# Patient Record
Sex: FEMALE | Race: WHITE | NOT HISPANIC OR LATINO | Employment: UNEMPLOYED | URBAN - NONMETROPOLITAN AREA
[De-identification: names, ages, dates, MRNs, and addresses within clinical notes are randomized per-mention and may not be internally consistent; named-entity substitution may affect disease eponyms.]

---

## 2017-05-30 ENCOUNTER — OFFICE VISIT (OUTPATIENT)
Dept: FAMILY MEDICINE | Facility: OTHER | Age: 37
End: 2017-05-30
Attending: NURSE PRACTITIONER
Payer: COMMERCIAL

## 2017-05-30 VITALS
DIASTOLIC BLOOD PRESSURE: 80 MMHG | HEART RATE: 82 BPM | RESPIRATION RATE: 18 BRPM | WEIGHT: 250 LBS | TEMPERATURE: 99.5 F | BODY MASS INDEX: 40.18 KG/M2 | OXYGEN SATURATION: 98 % | SYSTOLIC BLOOD PRESSURE: 128 MMHG | HEIGHT: 66 IN

## 2017-05-30 DIAGNOSIS — J01.00 ACUTE MAXILLARY SINUSITIS, RECURRENCE NOT SPECIFIED: ICD-10-CM

## 2017-05-30 DIAGNOSIS — R07.0 THROAT PAIN: Primary | ICD-10-CM

## 2017-05-30 LAB
DEPRECATED S PYO AG THROAT QL EIA: NORMAL
MICRO REPORT STATUS: NORMAL
SPECIMEN SOURCE: NORMAL

## 2017-05-30 PROCEDURE — 87081 CULTURE SCREEN ONLY: CPT | Performed by: NURSE PRACTITIONER

## 2017-05-30 PROCEDURE — 87880 STREP A ASSAY W/OPTIC: CPT | Performed by: NURSE PRACTITIONER

## 2017-05-30 PROCEDURE — 99213 OFFICE O/P EST LOW 20 MIN: CPT | Performed by: NURSE PRACTITIONER

## 2017-05-30 RX ORDER — FLUTICASONE PROPIONATE 50 MCG
2 SPRAY, SUSPENSION (ML) NASAL DAILY
Qty: 1 BOTTLE | Refills: 0 | Status: SHIPPED | OUTPATIENT
Start: 2017-05-30 | End: 2019-07-18

## 2017-05-30 ASSESSMENT — PAIN SCALES - GENERAL: PAINLEVEL: WORST PAIN (10)

## 2017-05-30 NOTE — MR AVS SNAPSHOT
After Visit Summary   5/30/2017    Gwendolyn Dietrich    MRN: [id number]           Patient Information     Date Of Birth          1980        Visit Information        Provider Department      5/30/2017 11:00 AM Maria G Castellanos APRN [facility]        Today's Diagnoses     Throat pain    -  1    Acute maxillary sinusitis, recurrence not specified          Care Instructions      Self-Care for Sinusitis  Sinusitis can often be managed with self-care. Self-care can keep sinuses moist and make you feel more comfortable. Remember to follow your doctor's instructions closely, which can make a big difference in getting your sinus problem under control.    Drink fluids  Drinking extra fluids -- a glass every hour or two -- helps thin your mucus, allowing it to drain from your sinuses more easily. A humidifier helps in much the same way. Fluids can also offset the drying effects of certain drugs.  Use saltwater rinses  Rinses help keep your sinuses and nose moist. Mix a teaspoon of salt in 8 ounces of fresh, warm water. Use a bulb syringe to gently squirt the water into your nose a few times a day. You can also buy ready-made saline nasal sprays.  Apply hot or cold packs  Applying heat to the area surrounding your sinuses may make you feel more comfortable. Use a hot water bottle or a hand towel dipped in hot water. Some people also find ice packs effective for relieving pain.  Medications  Your doctor may prescribe medications to help treat your sinusitis. If you have an infection, antibiotics can help clear it up. If you are prescribed antibiotics, take all pills on schedule until they are gone, even if you feel better. Decongestants help relieve swelling. Use decongestant sprays for short periods only under the direction of your doctor. If you have allergies, your doctor may prescribe medications to help relieve them.     [id number] [employer]. [address] [place] [address]  "Road, [place] [address]. All rights reserved. This information is not intended as a substitute for professional medical care. Always follow your healthcare professional's instructions.                Follow-ups after your visit        Follow-up notes from your care team     Return if symptoms worsen or fail to improve.      Who to contact     If you have questions or need follow up information about today's clinic visit or your schedule please contact [facility] directly at [phone].  Normal or non-critical lab and imaging results will be communicated to you by [employer], letter or phone within 4 business days after the clinic has received the results. If you do not hear from us within 7 days, please contact the clinic through [employer] or phone. If you have a critical or abnormal lab result, we will notify you by phone as soon as possible.  Submit refill requests through "[employer]" or call your pharmacy and they will forward the refill request to us. Please allow 3 business days for your refill to be completed.          Additional Information About Your Visit        [employer] Information     "[employer] gives you secure access to your electronic health record. If you see a primary care provider, you can also send messages to your care team and make appointments. If you have questions, please call your primary care clinic.  If you do not have a primary care provider, please call [phone] and they will assist you.        Care EveryWhere ID     This is your Care EveryWhere ID. This could be used by other organizations to access your [facility] medical records  [id number]        Your Vitals Were     Pulse Temperature Respirations Height Pulse Oximetry BMI (Body Mass Index)    82 99.5  F (37.5  C) (Tympanic) 18 5' 5.5\" (1.664 m) 98% 40.97 kg/m2       Blood Pressure from Last 3 Encounters:   05/30/17 128/80   11/09/16 118/76   11/01/16 110/78    Weight from Last 3 Encounters:   05/30/17 250 lb (113.4 kg) "   11/09/16 254 lb 12 oz (115.6 kg)   11/01/16 248 lb (112.5 kg)              We Performed the Following     Beta strep group A culture     Rapid strep screen          Today's Medication Changes          These changes are accurate as of: 5/30/17 11:35 AM.  If you have any questions, ask your nurse or doctor.               Start taking these medicines.        Dose/Directions    amoxicillin-clavulanate 875-125 MG per tablet   Commonly known as:  AUGMENTIN   Used for:  Acute maxillary sinusitis, recurrence not specified   Started by:  Maria G Castellanos APRN CNP        Dose:  1 tablet   Take 1 tablet by mouth 2 times daily   Quantity:  20 tablet   Refills:  0       fluticasone 50 MCG/ACT spray   Commonly known as:  FLONASE   Used for:  Acute maxillary sinusitis, recurrence not specified   Started by:  Maria G Castellanos APRN CNP        Dose:  2 spray   Spray 2 sprays into both nostrils daily   Quantity:  1 Bottle   Refills:  0            Where to get your medicines      These medications were sent to [facility] [address] [place] [address], [place] - [address] [place] [facility] [place] [facility] [address], [facility] [address]     Phone:  [phone]     amoxicillin-clavulanate 875-125 MG per tablet    fluticasone 50 MCG/ACT spray                Primary Care Provider    None       No address on file        Thank you!     Thank you for choosing [facility]  for your care. Our goal is always to provide you with excellent care. Hearing back from our patients is one way we can continue to improve our services. Please take a few minutes to complete the written survey that you may receive in the mail after your visit with us. Thank you!             Your Updated Medication List - Protect others around you: Learn how to safely use, store and throw away your medicines at www.disposemymeds.org.          This list is accurate as of: 5/30/17 11:35 AM.  Always use your most recent med list.                   Brand  Name Dispense Instructions for use    amoxicillin-clavulanate 875-125 MG per tablet    AUGMENTIN    20 tablet    Take 1 tablet by mouth 2 times daily       BENADRYL PO      Take by mouth every 4 hours as needed       EPINEPHrine 0.3 MG/0.3ML injection     2 each    Inject 0.3 mLs (0.3 mg) into the muscle once as needed for anaphylaxis       fluticasone 50 MCG/ACT spray    FLONASE    1 Bottle    Spray 2 sprays into both nostrils daily       nystatin-triamcinolone cream    MYCOLOG II    30 g    Apply topically 2 times daily

## 2017-05-30 NOTE — PATIENT INSTRUCTIONS
Self-Care for Sinusitis  Sinusitis can often be managed with self-care. Self-care can keep sinuses moist and make you feel more comfortable. Remember to follow your doctor's instructions closely, which can make a big difference in getting your sinus problem under control.    Drink fluids  Drinking extra fluids -- a glass every hour or two -- helps thin your mucus, allowing it to drain from your sinuses more easily. A humidifier helps in much the same way. Fluids can also offset the drying effects of certain drugs.  Use saltwater rinses  Rinses help keep your sinuses and nose moist. Mix a teaspoon of salt in 8 ounces of fresh, warm water. Use a bulb syringe to gently squirt the water into your nose a few times a day. You can also buy ready-made saline nasal sprays.  Apply hot or cold packs  Applying heat to the area surrounding your sinuses may make you feel more comfortable. Use a hot water bottle or a hand towel dipped in hot water. Some people also find ice packs effective for relieving pain.  Medications  Your doctor may prescribe medications to help treat your sinusitis. If you have an infection, antibiotics can help clear it up. If you are prescribed antibiotics, take all pills on schedule until they are gone, even if you feel better. Decongestants help relieve swelling. Use decongestant sprays for short periods only under the direction of your doctor. If you have allergies, your doctor may prescribe medications to help relieve them.     [id number] [employer]. [address] [place] [address], [place] [address]. All rights reserved. This information is not intended as a substitute for professional medical care. Always follow your healthcare professional's instructions.

## 2017-05-30 NOTE — PROGRESS NOTES
SUBJECTIVE:                                                    Gwendolyn Dietrich is a 36 year old female who presents to clinic today for the following health issues:      RESPIRATORY SYMPTOMS      Duration: 5 days    Description  nasal congestion, rhinorrhea, sore throat, facial pain/pressure, cough, fever, chills, ear pain bilateral, headache, fatigue/malaise, hoarse voice, nausea, stomach ache and diarrhea    Severity: severe - worsening symptoms    Accompanying signs and symptoms: see above    History (predisposing factors):  Exposure to school children    Precipitating or alleviating factors: None    Therapies tried and outcome:  rest and fluids oral decongestant           Problem list and histories reviewed & adjusted, as indicated.  Additional history: as documented    Patient Active Problem List   Diagnosis     Obesity     ACP (advance care planning)     Past Surgical History:   Procedure Laterality Date     ABDOMEN SURGERY  2012    Partial  hysterectomy     BACK SURGERY  2005     BIOPSY OF SKIN LESION       CHOLECYSTECTOMY  2011     HYSTERECTOMY  2012    menorrhagia, precancerous cells     ORTHOPEDIC SURGERY  2005    back surgery     TONSILLECTOMY       TUBAL LIGATION  2007       Social History   Substance Use Topics     Smoking status: Former Smoker     Types: Cigarettes     Quit date: 4/22/2008     Smokeless tobacco: Never Used     Alcohol use No     Family History   Problem Relation Age of Onset     Hearing Loss Father      Lipids Father      Hypertension Father      DIABETES Father      Lipids Mother      Hypertension Mother      Irritable Bowel Syndrome Mother      Obesity Mother      Rheumatoid Arthritis Mother      DIABETES Mother      CANCER Maternal Grandmother      OSTEOPOROSIS Maternal Grandmother      CANCER Paternal Grandmother          Current Outpatient Prescriptions   Medication Sig Dispense Refill     nystatin-triamcinolone (MYCOLOG II) cream Apply topically 2 times daily 30 g 1      "DiphenhydrAMINE HCl (BENADRYL PO) Take by mouth every 4 hours as needed       EPINEPHrine (EPIPEN) 0.3 MG/0.3ML injection Inject 0.3 mLs (0.3 mg) into the muscle once as needed for anaphylaxis 2 each 1     Allergies   Allergen Reactions     Codeine GI Disturbance       Reviewed and updated as needed this visit by clinical staff  Tobacco  Allergies  Meds  Med Hx  Surg Hx  Fam Hx  Soc Hx      Reviewed and updated as needed this visit by Provider         ROS:  CONSTITUTIONAL:chills, fatigue and fever   ENT/MOUTH: ear pain bilateral, hoarse voice, postnasal drainage, sinus pressure and sore throat  RESP:cough-non productive - shortness of breath  CV: tightness with shortness of breath  GI: diarrhea  : denies dysuria    OBJECTIVE:                                                    /80 (BP Location: Left arm, Patient Position: Chair, Cuff Size: Adult Large)  Pulse 82  Temp 99.5  F (37.5  C) (Tympanic)  Resp 18  Ht 5' 5.5\" (1.664 m)  Wt 250 lb (113.4 kg)  SpO2 98%  BMI 40.97 kg/m2  Body mass index is 40.97 kg/(m^2).   GENERAL: alert, no distress and fatigued  EYES: Eyes grossly normal to inspection, PERRL and conjunctivae and sclerae normal  HENT: normal cephalic/atraumatic, ear canals and TM's normal, nose and mouth without ulcers or lesions, oropharynx clear, oral mucous membranes moist and sinuses: maxillary, frontal tenderness on both sides, maxillary, frontal swelling on both sides  NECK: no adenopathy, no asymmetry, masses, or scars and thyroid normal to palpation  RESP: lungs clear to auscultation - no rales, rhonchi or wheezes  CV: regular rate and rhythm, normal S1 S2, no S3 or S4, no murmur, click or rub, no peripheral edema and peripheral pulses strong  ABDOMEN: soft, nontender, no hepatosplenomegaly, no masses and bowel sounds normal  PSYCH: mentation appears normal, affect normal/bright  LYMPH: normal ant/post cervical, supraclavicular nodes    Diagnostic Test Results:  none  "     ASSESSMENT:                                                        PLAN:                                                    ASSESSMENT / PLAN:  (R07.0) Throat pain  (primary encounter diagnosis)  Comment:   Plan:  Rapid strep screen,    Beta strep group A culture            (J01.00) Acute maxillary sinusitis, recurrence not specified  Comment:   Plan:  amoxicillin-clavulanate (AUGMENTIN) 875-125 MG per tablet,  fluticasone (FLONASE) 50 MCG/ACT spray   Saline nasal rinse   Stay hydrated      Follow up if no improvement or worsening symptoms             Maria G Castellanos, [facility]

## 2017-05-30 NOTE — NURSING NOTE
"Chief Complaint   Patient presents with     Sick       Initial /80 (BP Location: Left arm, Patient Position: Chair, Cuff Size: Adult Large)  Pulse 82  Temp 99.5  F (37.5  C) (Tympanic)  Resp 18  Ht 5' 5.5\" (1.664 m)  Wt 250 lb (113.4 kg)  SpO2 98%  BMI 40.97 kg/m2 Estimated body mass index is 40.97 kg/(m^2) as calculated from the following:    Height as of this encounter: 5' 5.5\" (1.664 m).    Weight as of this encounter: 250 lb (113.4 kg).  Medication Reconciliation: complete    "

## 2017-06-01 LAB
BACTERIA SPEC CULT: NORMAL
MICRO REPORT STATUS: NORMAL
SPECIMEN SOURCE: NORMAL

## 2017-09-17 DIAGNOSIS — T78.40XA ALLERGIC REACTION, INITIAL ENCOUNTER: ICD-10-CM

## 2017-09-17 DIAGNOSIS — T78.2XXA ANAPHYLACTIC REACTION: ICD-10-CM

## 2017-09-18 RX ORDER — EPINEPHRINE 0.3 MG/.3ML
INJECTION SUBCUTANEOUS
Qty: 2 ML | Refills: 0 | Status: SHIPPED | OUTPATIENT
Start: 2017-09-18 | End: 2019-09-13

## 2017-11-26 ENCOUNTER — HEALTH MAINTENANCE LETTER (OUTPATIENT)
Age: 37
End: 2017-11-26

## 2018-01-23 ENCOUNTER — DOCUMENTATION ONLY (OUTPATIENT)
Dept: FAMILY MEDICINE | Facility: OTHER | Age: 38
End: 2018-01-23

## 2018-01-23 PROBLEM — G43.909 MIGRAINE HEADACHE: Status: ACTIVE | Noted: 2018-01-23

## 2018-01-23 PROBLEM — E78.5 DYSLIPIDEMIA: Status: ACTIVE | Noted: 2018-01-23

## 2019-07-18 ENCOUNTER — HOSPITAL ENCOUNTER (EMERGENCY)
Facility: HOSPITAL | Age: 39
Discharge: HOME | End: 2019-07-18
Attending: NURSE PRACTITIONER | Admitting: NURSE PRACTITIONER
Payer: COMMERCIAL

## 2019-07-18 VITALS
TEMPERATURE: 97.7 F | DIASTOLIC BLOOD PRESSURE: 82 MMHG | SYSTOLIC BLOOD PRESSURE: 125 MMHG | RESPIRATION RATE: 18 BRPM | OXYGEN SATURATION: 98 %

## 2019-07-18 DIAGNOSIS — T78.40XA ALLERGIC REACTION, INITIAL ENCOUNTER: ICD-10-CM

## 2019-07-18 DIAGNOSIS — R22.0 FACIAL SWELLING: ICD-10-CM

## 2019-07-18 LAB
DEPRECATED S PYO AG THROAT QL EIA: NORMAL
SPECIMEN SOURCE: NORMAL

## 2019-07-18 PROCEDURE — 87880 STREP A ASSAY W/OPTIC: CPT | Performed by: NURSE PRACTITIONER

## 2019-07-18 PROCEDURE — G0463 HOSPITAL OUTPT CLINIC VISIT: HCPCS

## 2019-07-18 PROCEDURE — 99213 OFFICE O/P EST LOW 20 MIN: CPT | Mod: Z6 | Performed by: NURSE PRACTITIONER

## 2019-07-18 PROCEDURE — 25000125 ZZHC RX 250: Performed by: NURSE PRACTITIONER

## 2019-07-18 PROCEDURE — 25000132 ZZH RX MED GY IP 250 OP 250 PS 637: Performed by: NURSE PRACTITIONER

## 2019-07-18 PROCEDURE — 87081 CULTURE SCREEN ONLY: CPT | Performed by: NURSE PRACTITIONER

## 2019-07-18 RX ORDER — DEXAMETHASONE 2 MG/1
TABLET ORAL
Qty: 7 TABLET | Refills: 0 | Status: SHIPPED | OUTPATIENT
Start: 2019-07-18 | End: 2019-09-13

## 2019-07-18 RX ORDER — CETIRIZINE HYDROCHLORIDE 10 MG/1
10 TABLET ORAL ONCE
Status: COMPLETED | OUTPATIENT
Start: 2019-07-18 | End: 2019-07-18

## 2019-07-18 RX ORDER — EPINEPHRINE 0.3 MG/.3ML
0.3 INJECTION SUBCUTANEOUS PRN
Qty: 2 EACH | Refills: 1 | Status: SHIPPED | OUTPATIENT
Start: 2019-07-18 | End: 2020-11-16

## 2019-07-18 RX ORDER — DEXAMETHASONE SODIUM PHOSPHATE 10 MG/ML
10 INJECTION INTRAMUSCULAR; INTRAVENOUS ONCE
Status: COMPLETED | OUTPATIENT
Start: 2019-07-18 | End: 2019-07-18

## 2019-07-18 RX ADMIN — RANITIDINE 150 MG: 150 TABLET ORAL at 10:20

## 2019-07-18 RX ADMIN — CETIRIZINE HYDROCHLORIDE 10 MG: 10 TABLET ORAL at 10:20

## 2019-07-18 RX ADMIN — DEXAMETHASONE SODIUM PHOSPHATE 10 MG: 10 INJECTION INTRAMUSCULAR; INTRAVENOUS at 10:20

## 2019-07-18 ASSESSMENT — ENCOUNTER SYMPTOMS
FEVER: 0
DYSURIA: 0
MYALGIAS: 0
DIZZINESS: 0
APPETITE CHANGE: 0
VOMITING: 0
SORE THROAT: 1
ARTHRALGIAS: 0
PHOTOPHOBIA: 1
SHORTNESS OF BREATH: 0
SINUS PRESSURE: 0
SINUS PAIN: 0
WHEEZING: 0
CHILLS: 0
LIGHT-HEADEDNESS: 0
WEAKNESS: 0
JOINT SWELLING: 0
HEADACHES: 1
COUGH: 1
DIARRHEA: 1
NAUSEA: 0
FATIGUE: 0

## 2019-07-18 NOTE — ED NOTES
Pt presents with facial and tongue swelling. States her lips started feeling puffy yesterday and this morning her whole mouth was puffy. States throat is slightly sore but denies any throat swelling or hard time breathing. Denies doing or eating anything out of the norm. Has history of allergic reactions.

## 2019-07-18 NOTE — ED AVS SNAPSHOT
[facility]  [address]  [place] [address]  Phone:  [phone]                                    Gwendolyn Dietrich   MRN: [id number]    Department:  [facility]   Date of Visit:  7/18/2019           After Visit Summary Signature Page    I have received my discharge instructions, and my questions have been answered. I have discussed any challenges I see with this plan with the nurse or doctor.    ..........................................................................................................................................  Patient/Patient Representative Signature      ..........................................................................................................................................  Patient Representative Print Name and Relationship to Patient    ..................................................               ................................................  Date                                   Time    ..........................................................................................................................................  Reviewed by Signature/Title    ...................................................              ..............................................  Date                                               Time          22EPIC Rev 08/18

## 2019-07-18 NOTE — ED PROVIDER NOTES
"  History     Chief Complaint   Patient presents with     Allergic Reaction     facial swelling, tongue swelling. no shortness of breath. denies any throat swelling.      HPI  Gwendolyn Dietrich is a 38 year old female who presents today with a CC of lip swelling, sore throat, dry throat and mouth that started last night.  She denies trouble swallowing or breathing.  No wheezing.   She has a history of anaphylaxis in ~ .  She had allergy testing that showed multiple environmental allergies.  She has been outside frequently and has been sleeping with her windows open.  No other known exposures that she is aware of.  She is helping out with her son's baseball team, states \"they are little germ factories\".  Has sore throat, but thinks it is due to swelling/dry throat.    She has epi pens but they have , has not required epi injection.  She has not taken anything for symptoms.  She does not take a daily antihistamine.      Allergies:  Allergies   Allergen Reactions     Codeine GI Disturbance       Problem List:    Patient Active Problem List    Diagnosis Date Noted     Dyslipidemia 2018     Priority: Medium     Migraine headache 2018     Priority: Medium     Overview:   Recurrent migraine and tension headaches       ACP (advance care planning) 2016     Priority: Medium     Advance Care Planning 2016: ACP Review of Chart / Resources Provided:  Reviewed chart for advance care plan.  Gwendolyn Dietrich has no plan or code status on file. Discussed available resources and provided with information. Confirmed code status reflects current choices pending further ACP discussions.  Confirmed/documented legally designated decision makers.  Added by MARAH CALDERON             Pain in joint 12/10/2013     Priority: Medium     Obesity 2013     Priority: Medium     Hereditary and idiopathic peripheral neuropathy 2011     Priority: Medium        Past Medical History:    Past Medical " History:   Diagnosis Date     Obesity 2013       Past Surgical History:    Past Surgical History:   Procedure Laterality Date     ABDOMEN SURGERY  2012    Partial  hysterectomy     BACK SURGERY       BIOPSY OF SKIN LESION       CHOLECYSTECTOMY  2011     HYSTERECTOMY  2012    menorrhagia, precancerous cells     ORTHOPEDIC SURGERY      back surgery     TONSILLECTOMY       TUBAL LIGATION         Family History:    Family History   Problem Relation Age of Onset     Hearing Loss Father      Lipids Father      Hypertension Father      Diabetes Father      Lipids Mother      Hypertension Mother      Irritable Bowel Syndrome Mother      Obesity Mother      Rheumatoid Arthritis Mother      Diabetes Mother      Cancer Maternal Grandmother      Osteoporosis Maternal Grandmother      Cancer Paternal Grandmother        Social History:  Marital Status:   [2]  Social History     Tobacco Use     Smoking status: Former Smoker     Types: Cigarettes     Last attempt to quit: 2008     Years since quittin.2     Smokeless tobacco: Never Used   Substance Use Topics     Alcohol use: No     Drug use: No        Medications:      cetirizine HCl 10 MG CAPS   dexamethasone (DECADRON) 2 MG tablet   DiphenhydrAMINE HCl (BENADRYL PO)   EPINEPHrine (EPIPEN/ADRENACLICK/OR ANY BX GENERIC EQUIV) 0.3 MG/0.3ML injection 2-pack   ranitidine (ZANTAC) 150 MG tablet   EPINEPHrine (EPIPEN/ADRENACLICK/OR ANY BX GENERIC EQUIV) 0.3 MG/0.3ML injection 2-pack         Review of Systems   Constitutional: Negative for appetite change, chills, fatigue and fever.   HENT: Positive for sore throat. Negative for dental problem, ear pain, mouth sores, sinus pressure and sinus pain.         Lip swelling   Eyes: Positive for photophobia (with headache).   Respiratory: Positive for cough (mild). Negative for shortness of breath and wheezing.    Gastrointestinal: Positive for diarrhea (mild, ate greasy food). Negative for nausea and vomiting.    Genitourinary: Positive for decreased urine volume (mildly decreased). Negative for dysuria.   Musculoskeletal: Negative for arthralgias, joint swelling and myalgias.   Skin: Negative for rash.   Neurological: Positive for headaches (frontal x 3-4 days, 7/10, has a history of migraines). Negative for dizziness, weakness and light-headedness.       Physical Exam   BP: 125/82  Heart Rate: 73  Temp: 97.7  F (36.5  C)  Resp: 18  SpO2: 98 %      Physical Exam   Constitutional: She is oriented to person, place, and time. She appears well-developed. She is cooperative. She does not appear ill.   HENT:   Head: Normocephalic and atraumatic.   Right Ear: Tympanic membrane, external ear and ear canal normal.   Left Ear: Tympanic membrane, external ear and ear canal normal.   Nose: No mucosal edema. Right sinus exhibits no maxillary sinus tenderness and no frontal sinus tenderness. Left sinus exhibits no maxillary sinus tenderness and no frontal sinus tenderness.   Mouth/Throat: Mucous membranes are dry. No trismus in the jaw. No uvula swelling or dental caries. Posterior oropharyngeal edema (bilateral tonsillar pillars with mild edema, no erythema) present. No oropharyngeal exudate or tonsillar abscesses.   Both lips are mildly swollen   Eyes: Conjunctivae are normal.   Neck: Normal range of motion. Neck supple.   Cardiovascular: Normal rate and regular rhythm.   Pulmonary/Chest: Effort normal and breath sounds normal. No respiratory distress. She has no wheezes.   Musculoskeletal: Normal range of motion.   Neurological: She is alert and oriented to person, place, and time.   Skin: Skin is warm and dry.   Psychiatric: She has a normal mood and affect. Her behavior is normal.   Nursing note and vitals reviewed.      ED Course     Results for orders placed or performed during the hospital encounter of 07/18/19   Rapid strep screen   Result Value Ref Range    Specimen Description Throat     Rapid Strep A Screen       NEGATIVE:  No Group A streptococcal antigen detected by immunoassay, await culture report.   Beta strep group A culture   Result Value Ref Range    Specimen Description Throat     Culture Micro Culture negative monitoring continues        Procedures    Medications   dexamethasone oral soln (DECADRON) (inj used orally,not preservative free) 10 mg (10 mg Oral Given 7/18/19 1020)   ranitidine (ZANTAC) tablet 150 mg (150 mg Oral Given 7/18/19 1020)   cetirizine (zyrTEC) tablet 10 mg (10 mg Oral Given 7/18/19 1020)     Observed for a minimum of 20 minutes, tolerated medications well, no adverse efects noted.  Lip swelling has resolved on discharge    Assessments & Plan (with Medical Decision Making)     I have reviewed the nursing notes.    I have reviewed the findings, diagnosis, plan and need for follow up with the patient.  ASSESSMENT / PLAN:  (R22.0) Facial swelling  Comment: lip swelling, tonsillar pillar swelling that was resolved with decadron  Plan:  Decadron as prescribed  Zyrtec as prescribed  Zantac as prescribed  Epi pen prescription renewed - keep with you at all times  Return to ED with worsening of symptoms or new concerns  Patient requested referral to ENT for Allergy testing - this was done, they will call you to schedule  Follow up with PCP as needed if symptoms persist    (T78.40XA) Allergic reaction, initial encounter         Medication List      Started    cetirizine HCl 10 MG Caps  10 mg, Oral, AT BEDTIME     dexamethasone 2 MG tablet  Commonly known as:  DECADRON  Take 8 mg on 7/19 and 6 mg on 7/20 then discontinue     ranitidine 150 MG tablet  Commonly known as:  ZANTAC  150 mg, Oral, 2 TIMES DAILY        Modified    * EPINEPHrine 0.3 MG/0.3ML injection 2-pack  Commonly known as:  EPIPEN/ADRENACLICK/or ANY BX GENERIC EQUIV  INJECT 1 PEN INTO THE MUSCLE AS NEEDED FOR ANAPHYLAXIS  What changed:  Another medication with the same name was added. Make sure you understand how and when to take each.     *  EPINEPHrine 0.3 MG/0.3ML injection 2-pack  Commonly known as:  EPIPEN/ADRENACLICK/or ANY BX GENERIC EQUIV  0.3 mg, Intramuscular, PRN  What changed:  You were already taking a medication with the same name, and this prescription was added. Make sure you understand how and when to take each.         * This list has 2 medication(s) that are the same as other medications prescribed for you. Read the directions carefully, and ask your doctor or other care provider to review them with you.                Final diagnoses:   Facial swelling   Allergic reaction, initial encounter       7/18/2019   [facility]     Jazzmine Nava NP  07/19/19 [id number]

## 2019-07-20 LAB
BACTERIA SPEC CULT: NORMAL
SPECIMEN SOURCE: NORMAL

## 2019-09-12 NOTE — PROGRESS NOTES
"Otolaryngology Consultation    Patient: Gwendolyn Dietrich  : 1980    Chief Complaint   Patient presents with     Referral     LEO Nava Referral   Facial Sweeling & Allergies     HPI:  Gwendolyn Dietrich is a 38 year old female seen today for allergy evaluation.  She returns to ENT. She was last seen in  following allergic reaction. Complete MQT and Serolab testing.   She has noted concerns for allergy symptoms remaining.   She has ongoing nasal congestion, runny nose, sneezing, allergic conjunctivitis.   She had facial swelling along her lips. She does take her Zyrtec daily or most days.     Denies facial pain or pressure. No recurrent sinusitis.     Gwendolyn Dietrich is a 38 year old female who presents today with a CC of lip swelling, sore throat, dry throat and mouth that started last night.  She denies trouble swallowing or breathing.  No wheezing.   She has a history of anaphylaxis in ~ .  She had allergy testing that showed multiple environmental allergies.  She has been outside frequently and has been sleeping with her windows open.  No other known exposures that she is aware of.  She is helping out with her son's baseball team, states \"they are little germ factories\".  Has sore throat, but thinks it is due to swelling/dry throat.    She has epi pens but they have , has not required epi injection.  She has not taken anything for symptoms.  She does not take a daily antihistamine.    Mother has metal allergies.   No family hx of angioedema.     2015  Multiple positives.   Dilution #6: Birch, Alterna. Dilution #5: cottonwood, walnut, cat, and dust.      Serolab. Elevated IgE.   Positives to Dust, Egg White.   She had sensivity in past to oranges.   Current Outpatient Rx   Medication Sig Dispense Refill     cetirizine HCl 10 MG CAPS Take 1 capsule (10 mg) by mouth At Bedtime 30 capsule 11     dexamethasone (DECADRON) 2 MG tablet Take 8 mg on  and 6 mg on  then discontinue 7 " "tablet 0     DiphenhydrAMINE HCl (BENADRYL PO) Take by mouth every 4 hours as needed       EPINEPHrine (EPIPEN/ADRENACLICK/OR ANY BX GENERIC EQUIV) 0.3 MG/0.3ML injection 2-pack Inject 0.3 mLs (0.3 mg) into the muscle as needed for anaphylaxis 2 each 1     EPINEPHrine (EPIPEN/ADRENACLICK/OR ANY BX GENERIC EQUIV) 0.3 MG/0.3ML injection 2-pack INJECT 1 PEN INTO THE MUSCLE AS NEEDED FOR ANAPHYLAXIS 2 mL 0       Allergies: Codeine     Past Medical History:   Diagnosis Date     Obesity 2013       Past Surgical History:   Procedure Laterality Date     ABDOMEN SURGERY      Partial  hysterectomy     BACK SURGERY       BIOPSY OF SKIN LESION       CHOLECYSTECTOMY       HYSTERECTOMY      menorrhagia, precancerous cells     ORTHOPEDIC SURGERY      back surgery     TONSILLECTOMY       TUBAL LIGATION  2007       ENT family history reviewed    Social History     Tobacco Use     Smoking status: Former Smoker     Types: Cigarettes     Last attempt to quit: 2008     Years since quittin.3     Smokeless tobacco: Never Used   Substance Use Topics     Alcohol use: No     Drug use: No       Review of Systems  ROS: 10 point ROS neg other than the symptoms noted above in the HPI     Physical Exam  /74   Pulse 72   Temp 97  F (36.1  C) (Tympanic)   Ht 1.651 m (5' 5\")   Wt 111.1 kg (245 lb)   SpO2 98%   BMI 40.77 kg/m    General - The patient is well nourished and well developed, and appears to have good nutritional status.  Alert and oriented to person and place, answers questions and cooperates with examination appropriately.   Head and Face - Normocephalic and atraumatic, with no gross asymmetry noted.  The facial nerve is intact, with strong symmetric movements.  Voice and Breathing - The patient was breathing comfortably without the use of accessory muscles. There was no wheezing, stridor, or stertor.  The patients voice was clear and strong, and had appropriate pitch and quality.  Ears -The " external auditory canals are patent, the tympanic membranes are intact without effusion, retraction or mass.  Bony landmarks are intact.  Eyes - Extraocular movements intact, and the pupils were reactive to light.  Sclera were not icteric or injected, conjunctiva were pink and moist.  Mouth - Examination of the oral cavity showed pink, healthy oral mucosa. No lesions or ulcerations noted.  The tongue was mobile and midline, and the dentition were in good condition.    Throat - The walls of the oropharynx were smooth, pink, moist, symmetric, and had no lesions or ulcerations.  The tonsillar pillars and soft palate were symmetric.  The uvula was midline on elevation.    Neck - Normal midline excursion of the laryngotracheal complex during swallowing.  Full range of motion on passive movement.  Palpation of the occipital, submental, submandibular, internal jugular chain, and supraclavicular nodes did not demonstrate any abnormal lymph nodes or masses.  Palpation of the thyroid was soft and smooth, with no nodules or goiter appreciated.  The trachea was mobile and midline.  Nose - External contour is symmetric, no gross deflection or scars.  Nasal mucosa is pink and moist with no abnormal mucus.  The septum was intact and the turbinates are enlarged.  No polyps, masses, or purulence noted on examination.      ASSESSMENT:      ICD-10-CM    1. Perennial allergic rhinitis J30.89 fexofenadine (ALLEGRA) 180 MG tablet     Complement C4     C1-Esterase Inhibitor Level     CANCELED: C1 Esterase Inhibitor ([facility])   2. Facial swelling R22.0 fexofenadine (ALLEGRA) 180 MG tablet     Complement C4     C1-Esterase Inhibitor Level     CANCELED: C1 Esterase Inhibitor ([facility])   3. Angioedema, subsequent encounter T78.3XXD fexofenadine (ALLEGRA) 180 MG tablet     Complement C4     C1-Esterase Inhibitor Level     CANCELED: C1 Esterase Inhibitor ([facility])       Complete C1, C4 for rule out angioedema.   Change Zyrtec to Allegra.      Take your antihistamine daily (cetirizine, loratadine, fexofenadine, or similar) or twice daily if recommended.    Allergen avoidance measures were discussed and are important in preventing symptoms from occurring or worsening.    Indications for allergy testing include:   1) Confirm suspicion of allergic rhinitis due to inhalant allergies  2) Identify the offending allergen to determine specific mode of treatment  3) In the case of chronic rhinosinusitis: when symptoms are not controlled by avoidance and pharmacotherapy  4) In the Asthma patient when exacerbations may be due to perennial allergen exposure  5) Suspect food allergy  6) Otitis Media, chronic rhinitis, atopic dermatitis, Meniere disease, headache, pharyngitis or eye symptoms    Due to the findings above,  modified quantitative testing (MQT) will be performed.  Signed consent was obtained, and the risks of immunotherapy were discussed, including the potential for anaphylaxis.    If immunotherapy (IT) is recommended, there is continued risk of anaphylaxis.   Anaphylaxis can cause death. The patient will need to be monitored for 30 minutes post injection.  They must present their epinephrine pen prior to injection.  Subcutaneous as well as sublingual immunotherapy (SLIT) were discussed as potential treatment options.  The patient was told SLIT is not approved by the FDA and is cash pay.  The general time frame of immunotherapy was discussed (generally 3-5 years, sometimes longer), and the basic immunology behind IT was discussed.            Pati Bradley PA-C  [facility], [place]  [phone]

## 2019-09-13 ENCOUNTER — OFFICE VISIT (OUTPATIENT)
Dept: OTOLARYNGOLOGY | Facility: OTHER | Age: 39
End: 2019-09-13
Attending: NURSE PRACTITIONER
Payer: COMMERCIAL

## 2019-09-13 VITALS
HEART RATE: 72 BPM | SYSTOLIC BLOOD PRESSURE: 104 MMHG | HEIGHT: 65 IN | OXYGEN SATURATION: 98 % | WEIGHT: 245 LBS | TEMPERATURE: 97 F | BODY MASS INDEX: 40.82 KG/M2 | DIASTOLIC BLOOD PRESSURE: 74 MMHG

## 2019-09-13 DIAGNOSIS — J30.89 PERENNIAL ALLERGIC RHINITIS: Primary | ICD-10-CM

## 2019-09-13 DIAGNOSIS — T78.3XXD ANGIOEDEMA, SUBSEQUENT ENCOUNTER: ICD-10-CM

## 2019-09-13 DIAGNOSIS — R22.0 FACIAL SWELLING: ICD-10-CM

## 2019-09-13 PROCEDURE — 86160 COMPLEMENT ANTIGEN: CPT | Mod: 90 | Performed by: PHYSICIAN ASSISTANT

## 2019-09-13 PROCEDURE — 99214 OFFICE O/P EST MOD 30 MIN: CPT | Performed by: PHYSICIAN ASSISTANT

## 2019-09-13 PROCEDURE — 36415 COLL VENOUS BLD VENIPUNCTURE: CPT | Performed by: PHYSICIAN ASSISTANT

## 2019-09-13 PROCEDURE — 99000 SPECIMEN HANDLING OFFICE-LAB: CPT | Performed by: PHYSICIAN ASSISTANT

## 2019-09-13 PROCEDURE — 86160 COMPLEMENT ANTIGEN: CPT | Performed by: PHYSICIAN ASSISTANT

## 2019-09-13 RX ORDER — FEXOFENADINE HCL 180 MG/1
180 TABLET ORAL DAILY
Qty: 90 TABLET | Refills: 3 | Status: SHIPPED | OUTPATIENT
Start: 2019-09-13 | End: 2024-07-10

## 2019-09-13 ASSESSMENT — PAIN SCALES - GENERAL: PAINLEVEL: NO PAIN (0)

## 2019-09-13 ASSESSMENT — MIFFLIN-ST. JEOR: SCORE: 1782.19

## 2019-09-13 NOTE — LETTER
"    2019         RE: Gwendolyn Dietrich  [address] [address] [address]        Dear Colleague,    Thank you for referring your patient, Gwendolyn Dietrich, to the [facility]. Please see a copy of my visit note below.    Otolaryngology Consultation    Patient: Gwendolyn Dietrich  : 1980    Chief Complaint   Patient presents with     Referral     LEO Nava Referral   Facial Sweeling & Allergies     HPI:  Gwendolyn Dietrich is a 38 year old female seen today for allergy evaluation.  She returns to ENT. She was last seen in  following allergic reaction. Complete MQT and Serolab testing.   She has noted concerns for allergy symptoms remaining.   She has ongoing nasal congestion, runny nose, sneezing, allergic conjunctivitis.   She had facial swelling along her lips. She does take her Zyrtec daily or most days.     Denies facial pain or pressure. No recurrent sinusitis.     Gwendolyn Dietrich is a 38 year old female who presents today with a CC of lip swelling, sore throat, dry throat and mouth that started last night.  She denies trouble swallowing or breathing.  No wheezing.   She has a history of anaphylaxis in ~ .  She had allergy testing that showed multiple environmental allergies.  She has been outside frequently and has been sleeping with her windows open.  No other known exposures that she is aware of.  She is helping out with her son's baseball team, states \"they are little germ factories\".  Has sore throat, but thinks it is due to swelling/dry throat.    She has epi pens but they have , has not required epi injection.  She has not taken anything for symptoms.  She does not take a daily antihistamine.    Mother has metal allergies.   No family hx of angioedema.     2015  Multiple positives.   Dilution #6: Birch, Alterna. Dilution #5: cottonwood, walnut, cat, and dust.      Serolab. Elevated IgE.   Positives to Dust, Egg White.   She had sensivity in past to " "oranges.   Current Outpatient Rx   Medication Sig Dispense Refill     cetirizine HCl 10 MG CAPS Take 1 capsule (10 mg) by mouth At Bedtime 30 capsule 11     dexamethasone (DECADRON) 2 MG tablet Take 8 mg on  and 6 mg on  then discontinue 7 tablet 0     DiphenhydrAMINE HCl (BENADRYL PO) Take by mouth every 4 hours as needed       EPINEPHrine (EPIPEN/ADRENACLICK/OR ANY BX GENERIC EQUIV) 0.3 MG/0.3ML injection 2-pack Inject 0.3 mLs (0.3 mg) into the muscle as needed for anaphylaxis 2 each 1     EPINEPHrine (EPIPEN/ADRENACLICK/OR ANY BX GENERIC EQUIV) 0.3 MG/0.3ML injection 2-pack INJECT 1 PEN INTO THE MUSCLE AS NEEDED FOR ANAPHYLAXIS 2 mL 0       Allergies: Codeine     Past Medical History:   Diagnosis Date     Obesity 2013       Past Surgical History:   Procedure Laterality Date     ABDOMEN SURGERY      Partial  hysterectomy     BACK SURGERY       BIOPSY OF SKIN LESION       CHOLECYSTECTOMY       HYSTERECTOMY      menorrhagia, precancerous cells     ORTHOPEDIC SURGERY      back surgery     TONSILLECTOMY       TUBAL LIGATION  2007       ENT family history reviewed    Social History     Tobacco Use     Smoking status: Former Smoker     Types: Cigarettes     Last attempt to quit: 2008     Years since quittin.3     Smokeless tobacco: Never Used   Substance Use Topics     Alcohol use: No     Drug use: No       Review of Systems  ROS: 10 point ROS neg other than the symptoms noted above in the HPI     Physical Exam  /74   Pulse 72   Temp 97  F (36.1  C) (Tympanic)   Ht 1.651 m (5' 5\")   Wt 111.1 kg (245 lb)   SpO2 98%   BMI 40.77 kg/m     General - The patient is well nourished and well developed, and appears to have good nutritional status.  Alert and oriented to person and place, answers questions and cooperates with examination appropriately.   Head and Face - Normocephalic and atraumatic, with no gross asymmetry noted.  The facial nerve is intact, with strong " symmetric movements.  Voice and Breathing - The patient was breathing comfortably without the use of accessory muscles. There was no wheezing, stridor, or stertor.  The patients voice was clear and strong, and had appropriate pitch and quality.  Ears -The external auditory canals are patent, the tympanic membranes are intact without effusion, retraction or mass.  Bony landmarks are intact.  Eyes - Extraocular movements intact, and the pupils were reactive to light.  Sclera were not icteric or injected, conjunctiva were pink and moist.  Mouth - Examination of the oral cavity showed pink, healthy oral mucosa. No lesions or ulcerations noted.  The tongue was mobile and midline, and the dentition were in good condition.    Throat - The walls of the oropharynx were smooth, pink, moist, symmetric, and had no lesions or ulcerations.  The tonsillar pillars and soft palate were symmetric.  The uvula was midline on elevation.    Neck - Normal midline excursion of the laryngotracheal complex during swallowing.  Full range of motion on passive movement.  Palpation of the occipital, submental, submandibular, internal jugular chain, and supraclavicular nodes did not demonstrate any abnormal lymph nodes or masses.  Palpation of the thyroid was soft and smooth, with no nodules or goiter appreciated.  The trachea was mobile and midline.  Nose - External contour is symmetric, no gross deflection or scars.  Nasal mucosa is pink and moist with no abnormal mucus.  The septum was intact and the turbinates are enlarged.  No polyps, masses, or purulence noted on examination.      ASSESSMENT:      ICD-10-CM    1. Perennial allergic rhinitis J30.89 fexofenadine (ALLEGRA) 180 MG tablet     Complement C4     C1-Esterase Inhibitor Level     CANCELED: C1 Esterase Inhibitor ([facility])   2. Facial swelling R22.0 fexofenadine (ALLEGRA) 180 MG tablet     Complement C4     C1-Esterase Inhibitor Level     CANCELED: C1 Esterase Inhibitor ([facility])   3.  Angioedema, subsequent encounter T78.3XXD fexofenadine (ALLEGRA) 180 MG tablet     Complement C4     C1-Esterase Inhibitor Level     CANCELED: C1 Esterase Inhibitor ([facility])       Complete C1, C4 for rule out angioedema.   Change Zyrtec to Allegra.     Take your antihistamine daily (cetirizine, loratadine, fexofenadine, or similar) or twice daily if recommended.    Allergen avoidance measures were discussed and are important in preventing symptoms from occurring or worsening.    Indications for allergy testing include:   1) Confirm suspicion of allergic rhinitis due to inhalant allergies  2) Identify the offending allergen to determine specific mode of treatment  3) In the case of chronic rhinosinusitis: when symptoms are not controlled by avoidance and pharmacotherapy  4) In the Asthma patient when exacerbations may be due to perennial allergen exposure  5) Suspect food allergy  6) Otitis Media, chronic rhinitis, atopic dermatitis, Meniere disease, headache, pharyngitis or eye symptoms    Due to the findings above,  modified quantitative testing (MQT) will be performed.  Signed consent was obtained, and the risks of immunotherapy were discussed, including the potential for anaphylaxis.    If immunotherapy (IT) is recommended, there is continued risk of anaphylaxis.   Anaphylaxis can cause death. The patient will need to be monitored for 30 minutes post injection.  They must present their epinephrine pen prior to injection.  Subcutaneous as well as sublingual immunotherapy (SLIT) were discussed as potential treatment options.  The patient was told SLIT is not approved by the FDA and is cash pay.  The general time frame of immunotherapy was discussed (generally 3-5 years, sometimes longer), and the basic immunology behind IT was discussed.            Pati Bradley PA-C  [facility], [place]  [phone]        Again, thank you for allowing me to participate in the care of your patient.         Sincerely,        Pati Bradley PA-C

## 2019-09-13 NOTE — NURSING NOTE
"Chief Complaint   Patient presents with     Referral     LEO Nava Referral   Facial Sweeling & Allergies       Initial /74   Pulse 72   Temp 97  F (36.1  C) (Tympanic)   Ht 1.651 m (5' 5\")   Wt 111.1 kg (245 lb)   SpO2 98%   BMI 40.77 kg/m   Estimated body mass index is 40.77 kg/m  as calculated from the following:    Height as of this encounter: 1.651 m (5' 5\").    Weight as of this encounter: 111.1 kg (245 lb).  Medication Reconciliation: complete  "

## 2019-09-13 NOTE — PATIENT INSTRUCTIONS
Complete lab panel today for angioedema. Results take about 4-5 days to return.   Change Zyrtec to Allegra. Hold 5 days prior to allergy testing.       Thank you for allowing Pati Bradley PA-C and our ENT team to participate in your care.  If your medications are too expensive, please give the nurse a call.  We can possibly change this medication.  If you have a scheduling or an appointment question please contact our Health Unit Coordinator at their direct line [phone].   ALL nursing questions or concerns can be directed to your ENT nurse at: [phone] [place]

## 2019-09-13 NOTE — NURSING NOTE
Went over instructions with patient for allergy skin testing.  Reviewed patients current medications and patient will avoid all contraindicated medications prior to MQT testing.  Patient verbalizes understanding.  Copy of allergy testing packet given to patient.  Patient set up appointment for allergy skin testing and notified to call [facility] with any questions prior to testing.     Maude Huber RN

## 2019-09-16 LAB — C4 SERPL-MCNC: 23 MG/DL (ref 15–50)

## 2019-09-24 LAB — LAB SCANNED RESULT: NORMAL

## 2019-10-04 ENCOUNTER — OFFICE VISIT (OUTPATIENT)
Dept: OTOLARYNGOLOGY | Facility: OTHER | Age: 39
End: 2019-10-04
Attending: PHYSICIAN ASSISTANT
Payer: COMMERCIAL

## 2019-10-04 ENCOUNTER — OFFICE VISIT (OUTPATIENT)
Dept: ALLERGY | Facility: OTHER | Age: 39
End: 2019-10-04
Attending: PHYSICIAN ASSISTANT
Payer: COMMERCIAL

## 2019-10-04 VITALS
HEART RATE: 71 BPM | DIASTOLIC BLOOD PRESSURE: 81 MMHG | WEIGHT: 245 LBS | HEIGHT: 65 IN | TEMPERATURE: 96.6 F | SYSTOLIC BLOOD PRESSURE: 125 MMHG | OXYGEN SATURATION: 99 % | BODY MASS INDEX: 40.82 KG/M2

## 2019-10-04 DIAGNOSIS — J30.89 PERENNIAL ALLERGIC RHINITIS: Primary | ICD-10-CM

## 2019-10-04 DIAGNOSIS — R22.0 FACIAL SWELLING: ICD-10-CM

## 2019-10-04 PROCEDURE — 99213 OFFICE O/P EST LOW 20 MIN: CPT | Mod: 25 | Performed by: PHYSICIAN ASSISTANT

## 2019-10-04 PROCEDURE — 95004 PERQ TESTS W/ALRGNC XTRCS: CPT

## 2019-10-04 PROCEDURE — 95024 IQ TESTS W/ALLERGENIC XTRCS: CPT

## 2019-10-04 ASSESSMENT — MIFFLIN-ST. JEOR: SCORE: 1777.19

## 2019-10-04 ASSESSMENT — PAIN SCALES - GENERAL: PAINLEVEL: NO PAIN (0)

## 2019-10-04 NOTE — PATIENT INSTRUCTIONS
Work on allergy avoidance.   Start allergy injections.   Epipen is current. Must bring with you for injection.   Continue with Allegra daily    Follow up in 6 months.     Thank you for allowing Pati Bradley PA-C and our ENT team to participate in your care.  If your medications are too expensive, please give the nurse a call.  We can possibly change this medication.  If you have a scheduling or an appointment question please contact our Health Unit Coordinator at their direct line [phone].   ALL nursing questions or concerns can be directed to your ENT nurse at: [phone] [place]

## 2019-10-04 NOTE — NURSING NOTE
"Chief Complaint   Patient presents with     Other     MQT allergy testing and follow-up       Initial /81 (BP Location: Right arm, Patient Position: Chair, Cuff Size: Adult Large)   Pulse 71   Temp 96.6  F (35.9  C) (Oral)   Ht 1.651 m (5' 5\")   Wt 111.1 kg (245 lb)   SpO2 99%   BMI 40.77 kg/m   Estimated body mass index is 40.77 kg/m  as calculated from the following:    Height as of this encounter: 1.651 m (5' 5\").    Weight as of this encounter: 111.1 kg (245 lb).  Medication Reconciliation: complete  Maude Huber RN    This patient presents today for allergy skin testing.      Symptoms have included nasal congestion, runny nose, sneezing, itchy, watery eyes, and an occasional dry, barking cough.  Patient thinks she gets a sinus infection each year with sinus pressure and headache.  She breaks out in hives periodically, but has not pinpointed the cause.  She had an anaphylactic reaction in the spring of 2015, but again the cause is unknown.  She reports being in her home with the windows open.  She had not eaten anything yet.  She required epi and benadryl for treatment.  She had a recent allergic reaction (July 2019) in which she reported facial swelling along her lips.  She notes an intolerance to eggs and milk.  Symptoms are worse in the spring and fall seasons.  Patient had her tonsil and adenoids removed when she was around 9 years old.  She has no history of asthma or eczema.      This patient lives in a single family home, with a partially finished basement.  The home was built in the 1970's, but was remodeled in the early 2000's.  [place]  This patient does not suspect mold, water or moisture issues in the home.  A dehumidifier is used in the basement, which is where the patient's bedroom is located.  There is carpet in the home, and carpet in the bedroom.  Home has baseboard electric, propane stove, and wood stove (basement) heat and does not have air " conditioning.        This patient has 1 cat for a pet.  The cat is inside and does sleep in bed with the patient.    This patient had allergy testing in the past in 2015.  #6 dilution to birch and alternaria.  #5 dilution for cottonwood, walnut, cat, and dust.  She also had a RAST test done in 2015.    This patient's medications have been reviewed prior to testing and all appropriate medications have been stopped.    Consent is signed by patient and signature is verified.     MQT/ID test is performed per protocol.  The patient tolerated testing well.  Benadryl gel was applied to testing sites on patient's skin, and patient received Claritin 10 mg (chewable tablets), both per standing order.  All findings are recorded on the paper flow sheet. Results are reviewed with this patient.  They are given written information regarding allergy.       The patient will follow-up with TANNA Martinez, for treatment plan.      Maude Huber RN

## 2019-10-04 NOTE — PROGRESS NOTES
Prior to testing, verified patient's identity using patient's name and date of birth.    Gwendolyn was seen for allergy skin testing. Patient was seen by this nurse in conjunction with ENT provider. All encounter details are documented in ENT Provider's appointment from this same date. Please see referenced encounter for this visits documentation.     Maude Huber RN

## 2019-10-04 NOTE — LETTER
"    10/4/2019         RE: Gwendolyn Dietrich  [address] [facility] [address]        Dear Colleague,    Thank you for referring your patient, Gwendolyn Dietrich, to the [facility]. Please see a copy of my visit note below.    Chief Complaint   Patient presents with     Other     MQT allergy testing and follow-up       MQT  Dilution #6-Birch, Cat, Dust  Dilution #5- Ragweed, thistle, grass, maple, molds, dog  Dilution #2- pigweed, cottonwood, walnut, molds.     She has noted concerns for allergy symptoms remaining.   She has ongoing nasal congestion, runny nose, sneezing, allergic conjunctivitis.   She had facial swelling along her lips. She does take her Zyrtec daily or most days.      Denies facial pain or pressure. No recurrent sinusitis.     Past Medical History:   Diagnosis Date     Obesity 5/8/2013        Allergies   Allergen Reactions     Codeine GI Disturbance     Current Outpatient Medications   Medication     fexofenadine (ALLEGRA) 180 MG tablet     ORDER FOR ALLERGEN IMMUNOTHERAPY     cetirizine HCl 10 MG CAPS     EPINEPHrine (EPIPEN/ADRENACLICK/OR ANY BX GENERIC EQUIV) 0.3 MG/0.3ML injection 2-pack     No current facility-administered medications for this visit.       ROS: 10 point ROS neg other than the symptoms noted above in the HPI.  /81 (BP Location: Right arm, Patient Position: Chair, Cuff Size: Adult Large)   Pulse 71   Temp 96.6  F (35.9  C) (Oral)   Ht 1.651 m (5' 5\")   Wt 111.1 kg (245 lb)   SpO2 99%   BMI 40.77 kg/m     General - The patient is well nourished and well developed, and appears to have good nutritional status.  Alert and oriented to person and place, answers questions and cooperates with examination appropriately.   Head and Face - Normocephalic and atraumatic, with no gross asymmetry noted.  The facial nerve is intact, with strong symmetric movements.  Voice and Breathing - The patient was breathing comfortably without the use of accessory " muscles. There was no wheezing, stridor, or stertor.  The patients voice was clear and strong, and had appropriate pitch and quality.    ASSESSMENT:    ICD-10-CM    1. Perennial allergic rhinitis J30.89 ORDER FOR ALLERGEN IMMUNOTHERAPY   2. Facial swelling R22.0 ORDER FOR ALLERGEN IMMUNOTHERAPY       Work on allergy avoidance.   Start allergy injections.   Epipen is current. Must bring with you for injection.   Continue with Allegra daily    Follow up in 6 months.       Pati Bradley PA-C  [facility], [place]  [phone]      Again, thank you for allowing me to participate in the care of your patient.        Sincerely,        Pati Bradley PA-C

## 2019-10-04 NOTE — PROGRESS NOTES
"Chief Complaint   Patient presents with     Other     MQT allergy testing and follow-up       MQT  Dilution #6-Birch, Cat, Dust  Dilution #5- Ragweed, thistle, grass, maple, molds, dog  Dilution #2- pigweed, cottonwood, walnut, molds.     She has noted concerns for allergy symptoms remaining.   She has ongoing nasal congestion, runny nose, sneezing, allergic conjunctivitis.   She had facial swelling along her lips. She does take her Zyrtec daily or most days.      Denies facial pain or pressure. No recurrent sinusitis.     Past Medical History:   Diagnosis Date     Obesity 5/8/2013        Allergies   Allergen Reactions     Codeine GI Disturbance     Current Outpatient Medications   Medication     fexofenadine (ALLEGRA) 180 MG tablet     ORDER FOR ALLERGEN IMMUNOTHERAPY     cetirizine HCl 10 MG CAPS     EPINEPHrine (EPIPEN/ADRENACLICK/OR ANY BX GENERIC EQUIV) 0.3 MG/0.3ML injection 2-pack     No current facility-administered medications for this visit.       ROS: 10 point ROS neg other than the symptoms noted above in the HPI.  /81 (BP Location: Right arm, Patient Position: Chair, Cuff Size: Adult Large)   Pulse 71   Temp 96.6  F (35.9  C) (Oral)   Ht 1.651 m (5' 5\")   Wt 111.1 kg (245 lb)   SpO2 99%   BMI 40.77 kg/m    General - The patient is well nourished and well developed, and appears to have good nutritional status.  Alert and oriented to person and place, answers questions and cooperates with examination appropriately.   Head and Face - Normocephalic and atraumatic, with no gross asymmetry noted.  The facial nerve is intact, with strong symmetric movements.  Voice and Breathing - The patient was breathing comfortably without the use of accessory muscles. There was no wheezing, stridor, or stertor.  The patients voice was clear and strong, and had appropriate pitch and quality.    ASSESSMENT:    ICD-10-CM    1. Perennial allergic rhinitis J30.89 ORDER FOR ALLERGEN IMMUNOTHERAPY   2. Facial swelling " R22.0 ORDER FOR ALLERGEN IMMUNOTHERAPY       Work on allergy avoidance.   Start allergy injections.   Epipen is current. Must bring with you for injection.   Continue with Allegra daily    Follow up in 6 months.       Pati Bradley PA-C  [facility], [place]  [phone]

## 2019-10-08 ENCOUNTER — HOSPITAL ENCOUNTER (EMERGENCY)
Facility: HOSPITAL | Age: 39
Discharge: HOME | End: 2019-10-08
Attending: NURSE PRACTITIONER | Admitting: NURSE PRACTITIONER
Payer: COMMERCIAL

## 2019-10-08 VITALS
HEART RATE: 65 BPM | RESPIRATION RATE: 18 BRPM | TEMPERATURE: 98.1 F | OXYGEN SATURATION: 98 % | DIASTOLIC BLOOD PRESSURE: 76 MMHG | SYSTOLIC BLOOD PRESSURE: 140 MMHG

## 2019-10-08 DIAGNOSIS — J02.9 PHARYNGITIS: Primary | ICD-10-CM

## 2019-10-08 DIAGNOSIS — J02.9 PHARYNGITIS, UNSPECIFIED ETIOLOGY: ICD-10-CM

## 2019-10-08 LAB
DEPRECATED S PYO AG THROAT QL EIA: NORMAL
SPECIMEN SOURCE: NORMAL

## 2019-10-08 PROCEDURE — 87880 STREP A ASSAY W/OPTIC: CPT | Performed by: FAMILY MEDICINE

## 2019-10-08 PROCEDURE — 99213 OFFICE O/P EST LOW 20 MIN: CPT | Mod: Z6 | Performed by: NURSE PRACTITIONER

## 2019-10-08 PROCEDURE — 87081 CULTURE SCREEN ONLY: CPT | Performed by: FAMILY MEDICINE

## 2019-10-08 PROCEDURE — G0463 HOSPITAL OUTPT CLINIC VISIT: HCPCS

## 2019-10-08 ASSESSMENT — ENCOUNTER SYMPTOMS
RHINORRHEA: 1
SHORTNESS OF BREATH: 0
FEVER: 0
CHEST TIGHTNESS: 0
TROUBLE SWALLOWING: 0
NAUSEA: 0
SORE THROAT: 1
COUGH: 0
VOMITING: 0
APPETITE CHANGE: 0

## 2019-10-08 NOTE — ED AVS SNAPSHOT
[facility]  [address]  [place] [address]  Phone:  [phone]                                    Gwendolyn Dietrich   MRN: [id number]    Department:  [facility]   Date of Visit:  10/8/2019           After Visit Summary Signature Page    I have received my discharge instructions, and my questions have been answered. I have discussed any challenges I see with this plan with the nurse or doctor.    ..........................................................................................................................................  Patient/Patient Representative Signature      ..........................................................................................................................................  Patient Representative Print Name and Relationship to Patient    ..................................................               ................................................  Date                                   Time    ..........................................................................................................................................  Reviewed by Signature/Title    ...................................................              ..............................................  Date                                               Time          22EPIC Rev 08/18

## 2019-10-08 NOTE — ED PROVIDER NOTES
History     Chief Complaint   Patient presents with     Pharyngitis     HPI  Gwendolyn Dietrich is a 39 year old female who presents to  for sore throat. Onset 3-4 days ago.  She also has nasal congestion, ear pain, runny nose.  Denies fever, N/V, SOB, CP.  She reports recent allergy testing showing environmental allergies.  She has been taking ibuprofen for symptoms.      Allergies:  Allergies   Allergen Reactions     Codeine GI Disturbance       Problem List:    Patient Active Problem List    Diagnosis Date Noted     Dyslipidemia 01/23/2018     Priority: Medium     Migraine headache 01/23/2018     Priority: Medium     Overview:   Recurrent migraine and tension headaches       ACP (advance care planning) 11/01/2016     Priority: Medium     Advance Care Planning 11/1/2016: ACP Review of Chart / Resources Provided:  Reviewed chart for advance care plan.  Gwendolyn Dietrich has no plan or code status on file. Discussed available resources and provided with information. Confirmed code status reflects current choices pending further ACP discussions.  Confirmed/documented legally designated decision makers.  Added by MARAH CALDERON             Pain in joint 12/10/2013     Priority: Medium     Obesity 05/08/2013     Priority: Medium     Hereditary and idiopathic peripheral neuropathy 09/09/2011     Priority: Medium        Past Medical History:    Past Medical History:   Diagnosis Date     Obesity 5/8/2013       Past Surgical History:    Past Surgical History:   Procedure Laterality Date     ABDOMEN SURGERY  2012    Partial  hysterectomy     BACK SURGERY  2005     BIOPSY OF SKIN LESION       CHOLECYSTECTOMY  2011     HYSTERECTOMY  2012    menorrhagia, precancerous cells     ORTHOPEDIC SURGERY  2005    back surgery     TONSILLECTOMY       TUBAL LIGATION  2007       Family History:    Family History   Problem Relation Age of Onset     Hearing Loss Father      Lipids Father      Hypertension Father      Diabetes Father       Lipids Mother      Hypertension Mother      Irritable Bowel Syndrome Mother      Obesity Mother      Rheumatoid Arthritis Mother      Diabetes Mother      Cancer Maternal Grandmother      Osteoporosis Maternal Grandmother      Cancer Paternal Grandmother        Social History:  Marital Status:   [2]  Social History     Tobacco Use     Smoking status: Former Smoker     Types: Cigarettes     Last attempt to quit: 2008     Years since quittin.4     Smokeless tobacco: Never Used   Substance Use Topics     Alcohol use: No     Drug use: No        Medications:    fexofenadine (ALLEGRA) 180 MG tablet  EPINEPHrine (EPIPEN/ADRENACLICK/OR ANY BX GENERIC EQUIV) 0.3 MG/0.3ML injection 2-pack  ORDER FOR ALLERGEN IMMUNOTHERAPY          Review of Systems   Constitutional: Negative for appetite change and fever.   HENT: Positive for congestion, ear pain, rhinorrhea and sore throat. Negative for ear discharge and trouble swallowing.    Respiratory: Negative for cough, chest tightness and shortness of breath.    Cardiovascular: Negative for chest pain.   Gastrointestinal: Negative for nausea and vomiting.   All other systems reviewed and are negative.      Physical Exam   BP: 140/76  Pulse: 65  Temp: 98.1  F (36.7  C)  Resp: 18  SpO2: 98 %      Physical Exam  Vitals signs and nursing note reviewed.   Constitutional:       General: She is not in acute distress.     Appearance: She is well-developed. She is not ill-appearing, toxic-appearing or diaphoretic.   HENT:      Head: Normocephalic.      Right Ear: Tympanic membrane and ear canal normal.      Left Ear: Tympanic membrane and ear canal normal.      Mouth/Throat:      Mouth: Mucous membranes are moist.      Pharynx: No posterior oropharyngeal erythema.      Tonsils: No tonsillar exudate. Swellin on the right. 0 on the left.   Neck:      Musculoskeletal: Normal range of motion.   Cardiovascular:      Rate and Rhythm: Normal rate.      Heart sounds: Normal  heart sounds.   Pulmonary:      Effort: Pulmonary effort is normal.      Breath sounds: Normal breath sounds. No wheezing, rhonchi or rales.   Chest:      Chest wall: No tenderness.   Lymphadenopathy:      Cervical: No cervical adenopathy.   Skin:     General: Skin is warm and dry.      Capillary Refill: Capillary refill takes less than 2 seconds.   Neurological:      General: No focal deficit present.      Mental Status: She is alert and oriented to person, place, and time.         ED Course        Procedures              Results for orders placed or performed during the hospital encounter of 10/08/19 (from the past 24 hour(s))   Rapid strep screen   Result Value Ref Range    Specimen Description Throat     Rapid Strep A Screen       NEGATIVE: No Group A streptococcal antigen detected by immunoassay, await culture report.       Medications - No data to display    Assessments & Plan (with Medical Decision Making)     I have reviewed the nursing notes.    I have reviewed the findings, diagnosis, plan and need for follow up with the patient.  Pharyngitis:  Bilateral lung sounds CTA.  Heart rate and rhythm regular.  Bilateral TMs pearly gray.  Vital signs stable.  Rapid strep negative, culture pending.  Discussed findings with patient. Discussed symptomatic treatment of URI including OTC decongestants, throat lozenges, sipping warm or cold beverages and pushing fluids.  Advised patient to continue taking allergy medication.  Discussed with patient to follow-up with her doctor if no improvement in symptoms or return to emergency department for worsening or concerning symptoms.  Patient verbalized understanding and agreeable with plan of care.      Final diagnoses:   Pharyngitis       10/8/2019   [facility]     Kelly Cervantes CNP  10/08/19 1033

## 2019-10-08 NOTE — ED TRIAGE NOTES
Pt presents today with c/o sore throat, started 3 days ago. Took Ibuprofen 600mg last night, nothing today. Denies fevers.

## 2019-10-08 NOTE — DISCHARGE INSTRUCTIONS
Continue taking ibuprofen/tylenol as needed for pain. Drink plenty of fluids, try throat lozenges, suck on ice or popsicles, decongestants and sipping on warm or cold beverages.    Follow up with your doctor if no improvement.     Return to emergency department for worsening or concerning symptoms.

## 2019-10-09 ENCOUNTER — ALLIED HEALTH/NURSE VISIT (OUTPATIENT)
Dept: ALLERGY | Facility: OTHER | Age: 39
End: 2019-10-09
Attending: PHYSICIAN ASSISTANT
Payer: COMMERCIAL

## 2019-10-09 DIAGNOSIS — J30.89 PERENNIAL ALLERGIC RHINITIS: Primary | ICD-10-CM

## 2019-10-09 PROCEDURE — 95165 ANTIGEN THERAPY SERVICES: CPT | Performed by: PHYSICIAN ASSISTANT

## 2019-10-09 NOTE — PROGRESS NOTES
Allergy serum is mixed today at Silver schedule 1 of 4, into two (5 ml) multi dose vial/vials.    Allergens included were:    Ragweed  0.2 ml of dilution # 2  Pigweed  0.2 ml of dilution # 2  Mugwort 0.2 ml of dilution # 0  Kochia  0.2 ml of dilution # 0  Russian Thistle 0.2 ml of dilution # 2  Alfonso Grass 0.2 ml of dilution # 3  Birch mix 0.2 ml of dilution # 4  Maple Mix 0.2 of dilution # 3  Elm Mix 0.2 ml of dilution # 0  Oak Mix 0.2 ml of dilution # 0  Toribio Mix 0.2 ml of dilution # 0  Pine Mix 0.2 ml of dilution # 0  Eastern [place] 0.2 ml of dilution # 2  Black [place] 0.2 ml of dilution # 2  Aspen 0.2 ml of dilution # 0  Red [place] 0.2 ml of dilution # 0    Alternaria 0.2 ml of dilution # 2  Aspergillus 0.2 ml of dilution # 2  Hormodendrum 0.2 ml of dilution # 3  Helminthosporium 0.2 ml of dilution # 2  Penicillium 0.2 ml of dilution # 2  Epicoccum 0.2 ml of dilution # 2  Fusarium 0.2 ml of dilution # 2  Mucor 0.2 ml of dilution # 0  Grain Smut 0.2 ml of dilution # 0  Grass Smut 0.2 ml of dilution # 0  Cat 0.2 ml of dilution # 4  Dog 0.2 ml of dilution # 3  Feather Mix 0.2 ml of dilution # 0  Dust Mite Mix 0.2 ml of dilution # 4  Horse 0.2 ml of dilution # 0    Maude Huber RN

## 2019-10-10 LAB
BACTERIA SPEC CULT: NORMAL
SPECIMEN SOURCE: NORMAL

## 2019-10-11 ENCOUNTER — ALLIED HEALTH/NURSE VISIT (OUTPATIENT)
Dept: ALLERGY | Facility: OTHER | Age: 39
End: 2019-10-11
Attending: PHYSICIAN ASSISTANT
Payer: COMMERCIAL

## 2019-10-11 DIAGNOSIS — J30.89 PERENNIAL ALLERGIC RHINITIS: Primary | ICD-10-CM

## 2019-10-11 PROCEDURE — 95117 IMMUNOTHERAPY INJECTIONS: CPT

## 2019-10-11 NOTE — PROGRESS NOTES
Prior to new SCIT education and SCT, verified patient's identity using patient's name and date of birth.    Patient presents to allergy clinic for education and training on subcutaneous immunotherapy.  Patient educated on epi pen and an indications for use.  Patient verbalizes understanding.  New patient information sheet given and discussed anaphylaxis, local reactions and answered all questions to patients satisfaction.  Epi-pen is with patient today.     Safety vial test was done in the left arm, for new Silver vial # 1.   Administered  0.01ml (ID) for SVT.  SVT = 8 mm.   Passed.    Safety vial test was done in the right arm, for new Silver vial # 2.   Administered  0.01ml (ID) for SVT.  SVT = 7 mm.   Passed.    Allergy injection/s given and charted on paper allergy flow sheet.  Patient experienced no reaction.    Maude Huber RN

## 2019-10-18 ENCOUNTER — ALLIED HEALTH/NURSE VISIT (OUTPATIENT)
Dept: ALLERGY | Facility: OTHER | Age: 39
End: 2019-10-18
Attending: PHYSICIAN ASSISTANT
Payer: COMMERCIAL

## 2019-10-18 DIAGNOSIS — J30.9 ALLERGIC RHINITIS: Primary | ICD-10-CM

## 2019-10-18 PROCEDURE — 95117 IMMUNOTHERAPY INJECTIONS: CPT

## 2019-10-18 NOTE — PROGRESS NOTES
Verified by name and . Allergy injections given. Pt waiting 30 minutes. No reaction.    MICKEY ROJAS LPN

## 2019-10-25 ENCOUNTER — ALLIED HEALTH/NURSE VISIT (OUTPATIENT)
Dept: ALLERGY | Facility: OTHER | Age: 39
End: 2019-10-25
Attending: PHYSICIAN ASSISTANT
Payer: COMMERCIAL

## 2019-10-25 DIAGNOSIS — J30.9 ALLERGIC RHINITIS: Primary | ICD-10-CM

## 2019-10-25 PROCEDURE — 95117 IMMUNOTHERAPY INJECTIONS: CPT

## 2019-10-25 NOTE — PROGRESS NOTES
Allergy injection/s given and charted on paper allergy flow sheet.  Patient experienced no reaction.    Zaynab Barber LPN

## 2019-11-01 ENCOUNTER — ALLIED HEALTH/NURSE VISIT (OUTPATIENT)
Dept: ALLERGY | Facility: OTHER | Age: 39
End: 2019-11-01
Attending: PHYSICIAN ASSISTANT
Payer: COMMERCIAL

## 2019-11-01 DIAGNOSIS — J30.9 ALLERGIC RHINITIS: Primary | ICD-10-CM

## 2019-11-01 PROCEDURE — 95117 IMMUNOTHERAPY INJECTIONS: CPT

## 2019-11-08 ENCOUNTER — ALLIED HEALTH/NURSE VISIT (OUTPATIENT)
Dept: ALLERGY | Facility: OTHER | Age: 39
End: 2019-11-08
Attending: PHYSICIAN ASSISTANT
Payer: COMMERCIAL

## 2019-11-08 DIAGNOSIS — J30.9 ALLERGIC RHINITIS: Primary | ICD-10-CM

## 2019-11-08 PROCEDURE — 95117 IMMUNOTHERAPY INJECTIONS: CPT

## 2019-11-15 ENCOUNTER — ALLIED HEALTH/NURSE VISIT (OUTPATIENT)
Dept: ALLERGY | Facility: OTHER | Age: 39
End: 2019-11-15
Attending: PHYSICIAN ASSISTANT
Payer: COMMERCIAL

## 2019-11-15 DIAGNOSIS — J30.9 ALLERGIC RHINITIS: Primary | ICD-10-CM

## 2019-11-15 PROCEDURE — 95117 IMMUNOTHERAPY INJECTIONS: CPT

## 2019-11-15 NOTE — PROGRESS NOTES
Verified by name and . Waiting 30 minutes for observation. No reaction after 30 minutes.    MICKEY ROJAS LPN

## 2019-11-22 ENCOUNTER — ALLIED HEALTH/NURSE VISIT (OUTPATIENT)
Dept: ALLERGY | Facility: OTHER | Age: 39
End: 2019-11-22
Attending: PHYSICIAN ASSISTANT
Payer: COMMERCIAL

## 2019-11-22 DIAGNOSIS — J30.9 ALLERGIC RHINITIS: Primary | ICD-10-CM

## 2019-11-22 PROCEDURE — 95117 IMMUNOTHERAPY INJECTIONS: CPT

## 2019-11-29 ENCOUNTER — ALLIED HEALTH/NURSE VISIT (OUTPATIENT)
Dept: ALLERGY | Facility: OTHER | Age: 39
End: 2019-11-29
Attending: PHYSICIAN ASSISTANT
Payer: COMMERCIAL

## 2019-11-29 DIAGNOSIS — J30.89 PERENNIAL ALLERGIC RHINITIS: Primary | ICD-10-CM

## 2019-11-29 PROCEDURE — 95117 IMMUNOTHERAPY INJECTIONS: CPT

## 2019-11-29 NOTE — PROGRESS NOTES
Prior to injection, verified patient's identity using patient's name and date of birth.    Allergy injection/s given and charted on paper allergy flow sheet.  Patient experienced no reaction.    Maude Huber RN

## 2019-12-06 ENCOUNTER — ALLIED HEALTH/NURSE VISIT (OUTPATIENT)
Dept: ALLERGY | Facility: OTHER | Age: 39
End: 2019-12-06
Attending: PHYSICIAN ASSISTANT
Payer: COMMERCIAL

## 2019-12-06 DIAGNOSIS — J30.9 ALLERGIC RHINITIS: Primary | ICD-10-CM

## 2019-12-06 PROCEDURE — 95117 IMMUNOTHERAPY INJECTIONS: CPT

## 2019-12-06 NOTE — PROGRESS NOTES
Allergy injection/s given and charted on paper allergy flow sheet.  Patient left AMA and did not remain for observation. Consent form signed.    Zaynab Barber LPN

## 2019-12-13 ENCOUNTER — ALLIED HEALTH/NURSE VISIT (OUTPATIENT)
Dept: ALLERGY | Facility: OTHER | Age: 39
End: 2019-12-13
Attending: PHYSICIAN ASSISTANT
Payer: COMMERCIAL

## 2019-12-13 DIAGNOSIS — J30.9 ALLERGIC RHINITIS: Primary | ICD-10-CM

## 2019-12-13 PROCEDURE — 95117 IMMUNOTHERAPY INJECTIONS: CPT

## 2019-12-13 NOTE — PROGRESS NOTES
Allergy injection/s given and charted on paper allergy flow sheet.  Patient experienced unknown reaction.  Pt signed out AMA and did not wait the recommended 30 minutes.  TETO DONG LPN

## 2019-12-18 ENCOUNTER — ALLIED HEALTH/NURSE VISIT (OUTPATIENT)
Dept: ALLERGY | Facility: OTHER | Age: 39
End: 2019-12-18
Attending: PHYSICIAN ASSISTANT
Payer: COMMERCIAL

## 2019-12-18 DIAGNOSIS — J30.89 PERENNIAL ALLERGIC RHINITIS: Primary | ICD-10-CM

## 2019-12-18 PROCEDURE — 95165 ANTIGEN THERAPY SERVICES: CPT | Performed by: PHYSICIAN ASSISTANT

## 2019-12-18 NOTE — PROGRESS NOTES
Allergy serum is mixed today at schedule gold 2 of 4,  into   2  (5 ml)  multi dose vial/vials.    Allergens included were:    Ragweed  0.2 ml of dilution # 1  Pigweed  0.2 ml of dilution # 1  Mugwort 0.2 ml of dilution # 0  Kochia  0.2 ml of dilution # 0  Russian Thistle 0.2 ml of dilution # 1  Alfonso Grass 0.2 ml of dilution # 2  Birch mix 0.2 ml of dilution # 3  Maple Mix 0.2 of dilution # 2  Elm Mix 0.2 ml of dilution # 0  Oak Mix 0.2 ml of dilution # 0  Toribio Mix 0.2 ml of dilution # 0  Pine Mix 0.2 ml of dilution # 0  Eastern [place] 0.2 ml of dilution # 1  Black [place] 0.2 ml of dilution # 1  Aspen 0.2 ml of dilution # 0  Red [place] 0.2 ml of dilution # 0    Alternaria 0.2 ml of dilution # 1  Aspergillus 0.2 ml of dilution # 1  Hormodendrum 0.2 ml of dilution # 2  Helminthosporium 0.2 ml of dilution # 1  Penicillium 0.2 ml of dilution # 1  Epicoccum 0.2 ml of dilution # 1  Fusarium 0.2 ml of dilution # 1  Mucor 0.2 ml of dilution # 0  Grain Smut 0.2 ml of dilution # 0  Grass Smut 0.2 ml of dilution # 0  Cat 0.2 ml of dilution # 3  Dog 0.2 ml of dilution # 2  Feather Mix 0.2 ml of dilution # 0  Dust Mite Mix 0.2 ml of dilution # 3  Horse 0.2 ml of dilution # 0    Nataliya Haddad, RN, RN

## 2019-12-26 ENCOUNTER — OFFICE VISIT (OUTPATIENT)
Dept: ALLERGY | Facility: OTHER | Age: 39
End: 2019-12-26
Attending: PHYSICIAN ASSISTANT
Payer: COMMERCIAL

## 2019-12-26 DIAGNOSIS — J30.89 PERENNIAL ALLERGIC RHINITIS: Primary | ICD-10-CM

## 2019-12-26 PROCEDURE — 95117 IMMUNOTHERAPY INJECTIONS: CPT

## 2019-12-26 NOTE — PROGRESS NOTES
Prior to SVT, verified patient's identity using patient's name and date of birth.    Safety vial test was done in the left arm, for new Gold vial # 1.   Administered  0.01ml (ID) for SVT.  SVT = 8 mm.   Passed.    Safety vial test was done in the right arm, for new Gold vial # 2.   Administered  0.01ml (ID) for SVT.  SVT = 7 mm.   Passed.    Allergy injection/s given and charted on paper allergy flow sheet.  Patient experienced no reaction.    Maude Huber RN

## 2020-01-03 ENCOUNTER — ALLIED HEALTH/NURSE VISIT (OUTPATIENT)
Dept: ALLERGY | Facility: OTHER | Age: 40
End: 2020-01-03
Attending: PHYSICIAN ASSISTANT
Payer: COMMERCIAL

## 2020-01-03 DIAGNOSIS — J30.9 ALLERGIC RHINITIS: Primary | ICD-10-CM

## 2020-01-03 PROCEDURE — 95117 IMMUNOTHERAPY INJECTIONS: CPT

## 2020-01-03 NOTE — PROGRESS NOTES
Allergy injection/s given and charted on paper allergy flow sheet.  Patient experienced no reaction.    Patient signed waiver and left AMA without observation.

## 2020-01-10 ENCOUNTER — ALLIED HEALTH/NURSE VISIT (OUTPATIENT)
Dept: ALLERGY | Facility: OTHER | Age: 40
End: 2020-01-10
Attending: PHYSICIAN ASSISTANT
Payer: COMMERCIAL

## 2020-01-10 DIAGNOSIS — J30.9 ALLERGIC RHINITIS: Primary | ICD-10-CM

## 2020-01-10 PROCEDURE — 95117 IMMUNOTHERAPY INJECTIONS: CPT

## 2020-01-17 ENCOUNTER — ALLIED HEALTH/NURSE VISIT (OUTPATIENT)
Dept: ALLERGY | Facility: OTHER | Age: 40
End: 2020-01-17
Attending: PHYSICIAN ASSISTANT
Payer: COMMERCIAL

## 2020-01-17 DIAGNOSIS — J30.9 ALLERGIC RHINITIS: Primary | ICD-10-CM

## 2020-01-17 PROCEDURE — 95117 IMMUNOTHERAPY INJECTIONS: CPT

## 2020-01-24 ENCOUNTER — ALLIED HEALTH/NURSE VISIT (OUTPATIENT)
Dept: ALLERGY | Facility: OTHER | Age: 40
End: 2020-01-24
Attending: PHYSICIAN ASSISTANT
Payer: COMMERCIAL

## 2020-01-24 DIAGNOSIS — J30.9 ALLERGIC RHINITIS: Primary | ICD-10-CM

## 2020-01-24 PROCEDURE — 95117 IMMUNOTHERAPY INJECTIONS: CPT

## 2020-01-31 ENCOUNTER — ALLIED HEALTH/NURSE VISIT (OUTPATIENT)
Dept: ALLERGY | Facility: OTHER | Age: 40
End: 2020-01-31
Attending: PHYSICIAN ASSISTANT
Payer: COMMERCIAL

## 2020-01-31 DIAGNOSIS — J30.9 ALLERGIC RHINITIS: Primary | ICD-10-CM

## 2020-01-31 PROCEDURE — 95117 IMMUNOTHERAPY INJECTIONS: CPT

## 2020-02-07 ENCOUNTER — ALLIED HEALTH/NURSE VISIT (OUTPATIENT)
Dept: ALLERGY | Facility: OTHER | Age: 40
End: 2020-02-07
Attending: PHYSICIAN ASSISTANT
Payer: COMMERCIAL

## 2020-02-07 DIAGNOSIS — J30.9 ALLERGIC RHINITIS: Primary | ICD-10-CM

## 2020-02-07 PROCEDURE — 95117 IMMUNOTHERAPY INJECTIONS: CPT

## 2020-02-14 ENCOUNTER — ALLIED HEALTH/NURSE VISIT (OUTPATIENT)
Dept: ALLERGY | Facility: OTHER | Age: 40
End: 2020-02-14
Attending: PHYSICIAN ASSISTANT
Payer: COMMERCIAL

## 2020-02-14 DIAGNOSIS — J30.9 ALLERGIC RHINITIS: Primary | ICD-10-CM

## 2020-02-14 PROCEDURE — 95117 IMMUNOTHERAPY INJECTIONS: CPT

## 2020-02-14 NOTE — PROGRESS NOTES
Verified pt by name and date of birth. Allergy injection given. Pt signed out AMA without staying for observation. See paper chart.      MICKEY ROJAS LPN

## 2020-02-16 ENCOUNTER — HOSPITAL ENCOUNTER (EMERGENCY)
Facility: HOSPITAL | Age: 40
Discharge: HOME | End: 2020-02-16
Admitting: PHYSICIAN ASSISTANT
Payer: COMMERCIAL

## 2020-02-16 VITALS
HEART RATE: 76 BPM | OXYGEN SATURATION: 98 % | RESPIRATION RATE: 16 BRPM | SYSTOLIC BLOOD PRESSURE: 162 MMHG | DIASTOLIC BLOOD PRESSURE: 76 MMHG

## 2020-02-16 PROCEDURE — 40000268 ZZH STATISTIC NO CHARGES

## 2020-02-16 NOTE — ED TRIAGE NOTES
Pt is here for a nurse/lab only visit. Pt states a nurse poked herself with a dirty needle after giving pt a shot on Friday. Pt states she was asked by the nursing staff at this clinic to come and have her blood drawn.      Spoke with hospital lab- the nurse was already drawn on Friday and the paperwork for the source is in the lab ready for pt.  Pt states she does not want to be seen by a provider, she only wants her labs drawn.

## 2020-02-21 ENCOUNTER — ALLIED HEALTH/NURSE VISIT (OUTPATIENT)
Dept: ALLERGY | Facility: OTHER | Age: 40
End: 2020-02-21
Attending: PHYSICIAN ASSISTANT
Payer: COMMERCIAL

## 2020-02-21 DIAGNOSIS — J30.89 PERENNIAL ALLERGIC RHINITIS: Primary | ICD-10-CM

## 2020-02-21 PROCEDURE — 95117 IMMUNOTHERAPY INJECTIONS: CPT

## 2020-02-28 ENCOUNTER — ALLIED HEALTH/NURSE VISIT (OUTPATIENT)
Dept: ALLERGY | Facility: OTHER | Age: 40
End: 2020-02-28
Attending: PHYSICIAN ASSISTANT
Payer: COMMERCIAL

## 2020-02-28 DIAGNOSIS — J30.9 ALLERGIC RHINITIS: Primary | ICD-10-CM

## 2020-02-28 PROCEDURE — 95117 IMMUNOTHERAPY INJECTIONS: CPT

## 2020-03-02 ENCOUNTER — HEALTH MAINTENANCE LETTER (OUTPATIENT)
Age: 40
End: 2020-03-02

## 2020-03-06 ENCOUNTER — ALLIED HEALTH/NURSE VISIT (OUTPATIENT)
Dept: ALLERGY | Facility: OTHER | Age: 40
End: 2020-03-06
Attending: PHYSICIAN ASSISTANT
Payer: COMMERCIAL

## 2020-03-06 DIAGNOSIS — J30.9 ALLERGIC RHINITIS: Primary | ICD-10-CM

## 2020-03-06 PROCEDURE — 95117 IMMUNOTHERAPY INJECTIONS: CPT

## 2020-03-06 NOTE — PROGRESS NOTES
Allergy injection/s given and charted on paper allergy flow sheet.  Pt signed out MANE Escalera LPN

## 2020-03-09 ENCOUNTER — ALLIED HEALTH/NURSE VISIT (OUTPATIENT)
Dept: ALLERGY | Facility: OTHER | Age: 40
End: 2020-03-09
Attending: PHYSICIAN ASSISTANT
Payer: COMMERCIAL

## 2020-03-09 DIAGNOSIS — J30.89 PERENNIAL ALLERGIC RHINITIS: Primary | ICD-10-CM

## 2020-03-09 PROCEDURE — 95165 ANTIGEN THERAPY SERVICES: CPT | Performed by: PHYSICIAN ASSISTANT

## 2020-03-09 NOTE — PROGRESS NOTES
Allergy serum is mixed today at schedule blue 3 of 4,  into  2  (5 ml)  multi dose vial/vials.    Allergens included were:    Ragweed  0.2 ml of dilution # 1  Pigweed  0.2 ml of dilution # 1  Mugwort 0.2 ml of dilution # 0  Kochia  0.2 ml of dilution # 0  Russian Thistle 0.2 ml of dilution # 1  Alfonso Grass 0.2 ml of dilution # 1  Birch mix 0.2 ml of dilution # 2  Maple Mix 0.2 of dilution # 1  Elm Mix 0.2 ml of dilution # 0  Oak Mix 0.2 ml of dilution # 0  Toribio Mix 0.2 ml of dilution # 0  Pine Mix 0.2 ml of dilution # 0  Eastern [place] 0.2 ml of dilution # 1  Black [place] 0.2 ml of dilution # 1  Aspen 0.2 ml of dilution # 0  Red [place] 0.2 ml of dilution # 0    Alternaria 0.2 ml of dilution # 1  Aspergillus 0.2 ml of dilution # 1  Hormodendrum 0.2 ml of dilution # 1  Helminthosporium 0.2 ml of dilution # 1  Penicillium 0.2 ml of dilution # 1  Epicoccum 0.2 ml of dilution # 1  Fusarium 0.2 ml of dilution # 1  Mucor 0.2 ml of dilution # 0  Grain Smut 0.2 ml of dilution # 0  Grass Smut 0.2 ml of dilution # 0  Cat 0.2 ml of dilution # 2  Dog 0.2 ml of dilution # 1  Feather Mix 0.2 ml of dilution # 0  Dust Mite Mix 0.2 ml of dilution # 2  Horse 0.2 ml of dilution # 0    Nataliya Haddad, RN, RN

## 2020-03-13 ENCOUNTER — ALLIED HEALTH/NURSE VISIT (OUTPATIENT)
Dept: ALLERGY | Facility: OTHER | Age: 40
End: 2020-03-13
Attending: PHYSICIAN ASSISTANT
Payer: COMMERCIAL

## 2020-03-13 DIAGNOSIS — J30.9 ALLERGIC RHINITIS: Primary | ICD-10-CM

## 2020-03-13 PROCEDURE — 95117 IMMUNOTHERAPY INJECTIONS: CPT

## 2020-03-13 NOTE — PROGRESS NOTES
Prior to injection patient identity is verified using patient's name and date of birth.    Safety vial test was done in the left arm, for new Blue vial # 1.   Administered  0.01ml (ID) for SVT.  SVT = 9mm.   pass.    Safety vial test was done in the right arm, for new Blue vial # 2.   Administered  0.01ml (ID) for SVT.  SVT  =  7mm.    pass.    2 Allergy injections were given and charted on the paper flow sheet.      Per orders of Pati Bradley, allergy injections are given by Leandra Rivas LPN    The patient was instructed to remain in clinic for 30 minutes afterwards, and to report any adverse reaction to me immediately.    Patient signed out AMA.    Leandra Rivas LPN

## 2020-03-24 ENCOUNTER — TELEPHONE (OUTPATIENT)
Dept: OTOLARYNGOLOGY | Facility: OTHER | Age: 40
End: 2020-03-24

## 2020-03-24 NOTE — TELEPHONE ENCOUNTER
As noted. Likely not related to her injections.   She will need to start a build up per her protocol pending the noted length held.   TR

## 2020-03-24 NOTE — TELEPHONE ENCOUNTER
I spoke with Gwendolyn.  She is given leanne's reply.  She is also told to proceed to UC/ER if needed.  She does have an epi pen and is trained on its use.    Gwendolyn feels it may have been related to something outside, as she had forgotten she was out on Sunday.   She agrees with this plan and verbalized understanding.    She will call in the near future to see if she can again schedule her injections.    She does not want to change to drops due to cost.    Nataliya Haddad RN on 3/24/2020 at 11:48 AM

## 2020-03-24 NOTE — TELEPHONE ENCOUNTER
Gwendolyn called.  She states her allergy injection was cancelled on Friday (due to Covig 19).      She states she got hives Sunday evening for no apparent reason.  They were on her chest, and somewhat on her legs.  She took her antihistamine early to treat them.      She denies any other symptoms:  GI, oral, respiratory.      The hives were still there on Monday morning, but were completely gone with in 24 hrs.      Gwendolyn is wondering if it has anything to do with not getting her injection.  She has just started her 3d vial with her last injection being given on 3/13/20.    I told her I did not think it was related to not having her allergy injection.  Rather I believe she would have been worse with the injection.     She denies any new contacts, or new product use.    She is told to continue her meds as directed, and to continue to rinse her nose.    This message is forwarded to TANNA Martinez to review and advise, as needed.    Gwendolyn will be contacted with her reply.  Nataliya Haddad RN

## 2020-04-03 NOTE — PROGRESS NOTES
Allergy injection/s given and charted on paper allergy flow sheet.  Patient left AMA and did not remain for observation. Consent form signed.    Zaynab Barber LPN     Shortness of breath Opacity of lung on imaging study

## 2020-05-14 ENCOUNTER — TELEPHONE (OUTPATIENT)
Dept: OTOLARYNGOLOGY | Facility: OTHER | Age: 40
End: 2020-05-14

## 2020-05-14 NOTE — TELEPHONE ENCOUNTER
2:29 PM    Reason for Call: Phone Call    Description: Patient is wondering when she can start scheduling her allergy shots in the shot room again.  If somebody could please call her with this information.    Was an appointment offered for this call? No  If yes : Appointment type              Date    Preferred method for responding to this message: Telephone Call  What is your phone number ?    [phone]    If we cannot reach you directly, may we leave a detailed response at the number you provided? Yes    Can this message wait until your PCP/provider returns, if available today? YES    Marcela Choi

## 2020-06-22 ENCOUNTER — TELEPHONE (OUTPATIENT)
Dept: ALLERGY | Facility: OTHER | Age: 40
End: 2020-06-22

## 2020-06-22 NOTE — TELEPHONE ENCOUNTER
Spoke with patient about restarting allergy shots.  Explained due to lateness from COVID-19, patient will actually rebuild partially on the previous vial (gold).  Once 0.5 mL is reached on gold vial, patient will then continue to advance with building, per protocol.  Patient has current epi-pens.  She is scheduled for a SVT 7/10/20.    Maude Huber RN

## 2020-06-24 ENCOUNTER — ALLIED HEALTH/NURSE VISIT (OUTPATIENT)
Dept: ALLERGY | Facility: OTHER | Age: 40
End: 2020-06-24
Attending: PHYSICIAN ASSISTANT
Payer: COMMERCIAL

## 2020-06-24 DIAGNOSIS — J30.89 PERENNIAL ALLERGIC RHINITIS: Primary | ICD-10-CM

## 2020-06-24 PROCEDURE — 95165 ANTIGEN THERAPY SERVICES: CPT | Performed by: PHYSICIAN ASSISTANT

## 2020-06-24 NOTE — PROGRESS NOTES
Allergy serum is mixed today at Gold schedule 2 of 4, into two (5 ml) multi dose vial/vials.    Allergens included were:    Ragweed  0.2 ml of dilution # 1  Pigweed  0.2 ml of dilution # 1  Mugwort 0.2 ml of dilution # 0  Kochia  0.2 ml of dilution # 0  Russian Thistle 0.2 ml of dilution # 1  Alfonso Grass 0.2 ml of dilution # 2  Birch mix 0.2 ml of dilution # 3  Maple Mix 0.2 of dilution # 2  Elm Mix 0.2 ml of dilution # 0  Oak Mix 0.2 ml of dilution # 0  Toribio Mix 0.2 ml of dilution # 0  Pine Mix 0.2 ml of dilution # 0  Eastern [place] 0.2 ml of dilution # 1  Black [place] 0.2 ml of dilution # 1  Aspen 0.2 ml of dilution # 0  Red [place] 0.2 ml of dilution # 0    Alternaria 0.2 ml of dilution # 1  Aspergillus 0.2 ml of dilution # 1  Hormodendrum 0.2 ml of dilution # 1  Helminthosporium 0.2 ml of dilution # 1  Penicillium 0.2 ml of dilution # 1  Epicoccum 0.2 ml of dilution # 1  Fusarium 0.2 ml of dilution # 1  Mucor 0.2 ml of dilution # 0  Grain Smut 0.2 ml of dilution # 0  Grass Smut 0.2 ml of dilution # 0  Cat 0.2 ml of dilution # 3  Dog 0.2 ml of dilution # 2  Feather Mix 0.2 ml of dilution # 0  Dust Mite Mix 0.2 ml of dilution # 3  Horse 0.2 ml of dilution # 0    Maude Huber RN

## 2020-07-10 ENCOUNTER — ALLIED HEALTH/NURSE VISIT (OUTPATIENT)
Dept: ALLERGY | Facility: OTHER | Age: 40
End: 2020-07-10
Attending: PHYSICIAN ASSISTANT
Payer: COMMERCIAL

## 2020-07-10 DIAGNOSIS — J30.89 PERENNIAL ALLERGIC RHINITIS: Primary | ICD-10-CM

## 2020-07-10 PROCEDURE — 95117 IMMUNOTHERAPY INJECTIONS: CPT

## 2020-07-10 NOTE — PROGRESS NOTES
Prior to injection, verified patient's identity using patient's name and date of birth.    Safety vial test was done in the left arm, for new Gold vial # 1.   Administered  0.01ml (ID) for SVT.  SVT = 9 mm.   Passed.    Safety vial test was done in the right arm, for new Gold vial # 2.   Administered  0.01ml (ID) for SVT.  SVT = 7 mm.   Passed.    Allergy injection/s given and charted on paper allergy flow sheet.  Patient experienced no reaction.      Maude Huber RN

## 2020-07-16 ENCOUNTER — TELEPHONE (OUTPATIENT)
Dept: ALLERGY | Facility: OTHER | Age: 40
End: 2020-07-16

## 2020-07-16 NOTE — TELEPHONE ENCOUNTER
No would recommend wearing a mask.   At this time, we are not completing letters. Masks are recommended. Make sure mask is clean for time of use.   TR

## 2020-07-16 NOTE — TELEPHONE ENCOUNTER
Spoke with patient and notified of recommendation from TANNA Martinez.  Patient verbalized understanding.    Maude Huber RN

## 2020-07-16 NOTE — TELEPHONE ENCOUNTER
Patient called regarding the mandatory masking requirements that are becoming more prominent in public businesses.  She states she is very uncomfortable having to wear a mask given her allergies, congestion, and past anaphylaxis episodes.  She notes one of the episodes only involved mouth swelling, so she is nervous about wearing the mask and not knowing if she is having anaphylaxis or if it is just congestion.  She is wondering if you would consider writing a letter indicating she cannot wear a mask for these reasons.  Please advise.  Thank you.    Maude Huber RN

## 2020-07-20 ENCOUNTER — OFFICE VISIT (OUTPATIENT)
Dept: ALLERGY | Facility: OTHER | Age: 40
End: 2020-07-20
Attending: PHYSICIAN ASSISTANT
Payer: COMMERCIAL

## 2020-07-20 DIAGNOSIS — J30.9 ALLERGIC RHINITIS: Primary | ICD-10-CM

## 2020-07-20 PROCEDURE — 95117 IMMUNOTHERAPY INJECTIONS: CPT

## 2020-07-20 NOTE — PROGRESS NOTES
Prior to injection, verified patient identity by using patient's name and date of birth.  Allergy injection given and charted on paper allergy flow sheet.     Patient experienced unknown reaction.  Patient signed out AMA.    Leandra Rivas LPN

## 2020-07-27 ENCOUNTER — ALLIED HEALTH/NURSE VISIT (OUTPATIENT)
Dept: ALLERGY | Facility: OTHER | Age: 40
End: 2020-07-27
Attending: PHYSICIAN ASSISTANT
Payer: COMMERCIAL

## 2020-07-27 DIAGNOSIS — J30.9 ALLERGIC RHINITIS: Primary | ICD-10-CM

## 2020-07-27 PROCEDURE — 95117 IMMUNOTHERAPY INJECTIONS: CPT

## 2020-07-27 NOTE — PROGRESS NOTES
Verified pt by name and date of birth. Allergy injection given. Pt signed out AMA without staying for observation.      MICKEY ROJAS LPN

## 2020-08-03 ENCOUNTER — ALLIED HEALTH/NURSE VISIT (OUTPATIENT)
Dept: ALLERGY | Facility: OTHER | Age: 40
End: 2020-08-03
Attending: PHYSICIAN ASSISTANT
Payer: COMMERCIAL

## 2020-08-03 DIAGNOSIS — J30.9 ALLERGIC RHINITIS: Primary | ICD-10-CM

## 2020-08-03 PROCEDURE — 95117 IMMUNOTHERAPY INJECTIONS: CPT

## 2020-08-03 NOTE — PROGRESS NOTES
Prior to injection patient identity is verified using patient's name and date of birth.    Safety vial test was done in the Left arm, for new Blue vial # 1.   Administered  0.01ml (ID) for SVT.  SVT = 7.   Pass.    Safety vial test was done in the Right arm, for new Blue vial # 2.   Administered  0.01ml (ID) for SVT.  SVT  =  9mm.    Pass.    2 Allergy injections were given and charted on the paper flow sheet.      Per orders of Pati Bradley, allergy injections are given by Nelida Hicks LPN    The patient signed out AMA.    Nelida Hicks LPN

## 2020-08-10 ENCOUNTER — ALLIED HEALTH/NURSE VISIT (OUTPATIENT)
Dept: ALLERGY | Facility: OTHER | Age: 40
End: 2020-08-10
Attending: PHYSICIAN ASSISTANT
Payer: COMMERCIAL

## 2020-08-10 DIAGNOSIS — J30.9 ALLERGIC RHINITIS: Primary | ICD-10-CM

## 2020-08-10 PROCEDURE — 95117 IMMUNOTHERAPY INJECTIONS: CPT

## 2020-08-20 ENCOUNTER — OFFICE VISIT (OUTPATIENT)
Dept: OTOLARYNGOLOGY | Facility: OTHER | Age: 40
End: 2020-08-20
Attending: PHYSICIAN ASSISTANT
Payer: COMMERCIAL

## 2020-08-20 VITALS
DIASTOLIC BLOOD PRESSURE: 80 MMHG | HEART RATE: 67 BPM | TEMPERATURE: 97 F | SYSTOLIC BLOOD PRESSURE: 118 MMHG | BODY MASS INDEX: 40.77 KG/M2 | OXYGEN SATURATION: 98 % | WEIGHT: 245 LBS

## 2020-08-20 DIAGNOSIS — J30.89 PERENNIAL ALLERGIC RHINITIS: Primary | ICD-10-CM

## 2020-08-20 PROCEDURE — 99213 OFFICE O/P EST LOW 20 MIN: CPT | Performed by: PHYSICIAN ASSISTANT

## 2020-08-20 ASSESSMENT — PAIN SCALES - GENERAL: PAINLEVEL: NO PAIN (0)

## 2020-08-20 NOTE — LETTER
8/20/2020         RE: Gwendolyn Dietrich  [address] [facility] [address]        Dear Colleague,    Thank you for referring your patient, Gwendolyn Dietrich, to the [facility]. Please see a copy of my visit note below.    Chief Complaint   Patient presents with     Allergies     Pt is here for a f/u immunotherapy injections.         Patient presents for f/u of SCIT.   She was last seen in ENT on 10/4/19 following MQT testing. At that visit, she was doing well and started IT. She had been completing weekly injections w/o concerns. However, due to COVID was halted for about 12 weeks.   Today, she presents for routine allergy follow up.  She is doing well. Reports some headache/ fatigue for about 2 days after shots.     MQT  Dilution #6-Birch, Cat, Dust  Dilution #5- Ragweed, thistle, grass, maple, molds, dog  Dilution #2- pigweed, cottonwood, walnut, molds.      She has noted concerns for allergy symptoms remaining.   She has ongoing nasal congestion, runny nose, sneezing, allergic conjunctivitis.   She had facial swelling along her lips. She does take her Zyrtec daily or most days.      Denies facial pain or pressure. No recurrent sinusitis.        Past Medical History:   Diagnosis Date     Obesity 5/8/2013        Allergies   Allergen Reactions     Codeine GI Disturbance     Current Outpatient Medications   Medication     EPINEPHrine (EPIPEN/ADRENACLICK/OR ANY BX GENERIC EQUIV) 0.3 MG/0.3ML injection 2-pack     fexofenadine (ALLEGRA) 180 MG tablet     ORDER FOR ALLERGEN IMMUNOTHERAPY     No current facility-administered medications for this visit.       ROS: 10 point ROS neg other than the symptoms noted above in the HPI.  /80   Pulse 67   Temp 97  F (36.1  C) (Tympanic)   Wt 111.1 kg (245 lb)   SpO2 98%   BMI 40.77 kg/m      General - The patient is well nourished and well developed, and appears to have good nutritional status.  Alert and oriented to person and place,  answers questions and cooperates with examination appropriately.   Head and Face - Normocephalic and atraumatic, with no gross asymmetry noted.  The facial nerve is intact, with strong symmetric movements.  Voice and Breathing - The patient was breathing comfortably without the use of accessory muscles. There was no wheezing, stridor, or stertor.  The patients voice was clear and strong, and had appropriate pitch and quality.  Ears -The external auditory canals are patent, the tympanic membranes are intact without effusion, retraction or mass.  Bony landmarks are intact.  Eyes - Extraocular movements intact, and the pupils were reactive to light.  Sclera were not icteric or injected, conjunctiva were pink and moist.  Mouth - Examination of the oral cavity showed pink, healthy oral mucosa. No lesions or ulcerations noted.  The tongue was mobile and midline, and the dentition were in good condition.    Throat - The walls of the oropharynx were smooth, pink, moist, symmetric, and had no lesions or ulcerations.  The tonsillar pillars and soft palate were symmetric.  The uvula was midline on elevation.    Neck - Normal midline excursion of the laryngotracheal complex during swallowing.  Full range of motion on passive movement.  Palpation of the occipital, submental, submandibular, internal jugular chain, and supraclavicular nodes did not demonstrate any abnormal lymph nodes or masses.  Palpation of the thyroid was soft and smooth, with no nodules or goiter appreciated.  The trachea was mobile and midline.  Nose - External contour is symmetric, no gross deflection or scars.  Nasal mucosa is pink and moist with no abnormal mucus.  The septum was intact and the turbinates are enlarged.  No polyps, masses, or purulence noted on examination.       ASSESSMENT:    ICD-10-CM    1. Perennial allergic rhinitis  J30.89            Continue with weekly allergy shots, complete build up schedule.   Continue with daily Allegra.   Take  Claritin 5-10 mg day before and day of allergy injections.   Epipen is current and will request refill this fall.     Follow up in 6-12 months       Pati Bradley PA-C  [facility], [place]  [phone]         Again, thank you for allowing me to participate in the care of your patient.        Sincerely,        Pati Bradley PA-C

## 2020-08-20 NOTE — PROGRESS NOTES
Chief Complaint   Patient presents with     Allergies     Pt is here for a f/u immunotherapy injections.         Patient presents for f/u of SCIT.   She was last seen in ENT on 10/4/19 following MQT testing. At that visit, she was doing well and started IT. She had been completing weekly injections w/o concerns. However, due to COVID was halted for about 12 weeks.   Today, she presents for routine allergy follow up.  She is doing well. Reports some headache/ fatigue for about 2 days after shots.     MQT  Dilution #6-Birch, Cat, Dust  Dilution #5- Ragweed, thistle, grass, maple, molds, dog  Dilution #2- pigweed, cottonwood, walnut, molds.      She has noted concerns for allergy symptoms remaining.   She has ongoing nasal congestion, runny nose, sneezing, allergic conjunctivitis.   She had facial swelling along her lips. She does take her Zyrtec daily or most days.      Denies facial pain or pressure. No recurrent sinusitis.        Past Medical History:   Diagnosis Date     Obesity 5/8/2013        Allergies   Allergen Reactions     Codeine GI Disturbance     Current Outpatient Medications   Medication     EPINEPHrine (EPIPEN/ADRENACLICK/OR ANY BX GENERIC EQUIV) 0.3 MG/0.3ML injection 2-pack     fexofenadine (ALLEGRA) 180 MG tablet     ORDER FOR ALLERGEN IMMUNOTHERAPY     No current facility-administered medications for this visit.       ROS: 10 point ROS neg other than the symptoms noted above in the HPI.  /80   Pulse 67   Temp 97  F (36.1  C) (Tympanic)   Wt 111.1 kg (245 lb)   SpO2 98%   BMI 40.77 kg/m      General - The patient is well nourished and well developed, and appears to have good nutritional status.  Alert and oriented to person and place, answers questions and cooperates with examination appropriately.   Head and Face - Normocephalic and atraumatic, with no gross asymmetry noted.  The facial nerve is intact, with strong symmetric movements.  Voice and Breathing - The patient was breathing  comfortably without the use of accessory muscles. There was no wheezing, stridor, or stertor.  The patients voice was clear and strong, and had appropriate pitch and quality.  Ears -The external auditory canals are patent, the tympanic membranes are intact without effusion, retraction or mass.  Bony landmarks are intact.  Eyes - Extraocular movements intact, and the pupils were reactive to light.  Sclera were not icteric or injected, conjunctiva were pink and moist.  Mouth - Examination of the oral cavity showed pink, healthy oral mucosa. No lesions or ulcerations noted.  The tongue was mobile and midline, and the dentition were in good condition.    Throat - The walls of the oropharynx were smooth, pink, moist, symmetric, and had no lesions or ulcerations.  The tonsillar pillars and soft palate were symmetric.  The uvula was midline on elevation.    Neck - Normal midline excursion of the laryngotracheal complex during swallowing.  Full range of motion on passive movement.  Palpation of the occipital, submental, submandibular, internal jugular chain, and supraclavicular nodes did not demonstrate any abnormal lymph nodes or masses.  Palpation of the thyroid was soft and smooth, with no nodules or goiter appreciated.  The trachea was mobile and midline.  Nose - External contour is symmetric, no gross deflection or scars.  Nasal mucosa is pink and moist with no abnormal mucus.  The septum was intact and the turbinates are enlarged.  No polyps, masses, or purulence noted on examination.       ASSESSMENT:    ICD-10-CM    1. Perennial allergic rhinitis  J30.89            Continue with weekly allergy shots, complete build up schedule.   Continue with daily Allegra.   Take Claritin 5-10 mg day before and day of allergy injections.   Epipen is current and will request refill this fall.     Follow up in 6-12 months       Pati Bradley PA-C  ENT  [facility], [place]  [phone]

## 2020-08-20 NOTE — NURSING NOTE
"Chief Complaint   Patient presents with     Allergies     Pt is here for a f/u immunotherapy injections.       Initial /80   Pulse 67   Temp 97  F (36.1  C) (Tympanic)   Wt 111.1 kg (245 lb)   SpO2 98%   BMI 40.77 kg/m   Estimated body mass index is 40.77 kg/m  as calculated from the following:    Height as of 10/4/19: 1.651 m (5' 5\").    Weight as of this encounter: 111.1 kg (245 lb).  Medication Reconciliation: complete  Jania Hwang LPN  "

## 2020-08-20 NOTE — PATIENT INSTRUCTIONS
Continue with weekly allergy shots, complete build up schedule.   Continue with daily Allegra.   Take Claritin 5-10 mg day before and day of allergy injections.     Follow up in 6-12 months     Thank you for allowing Pati Bradley PA-C and our ENT team to participate in your care.  If your medications are too expensive, please give the nurse a call.  We can possibly change this medication.  If you have a scheduling or an appointment question please contact our Health Unit Coordinator at their direct line [phone].   ALL nursing questions or concerns can be directed to your ENT nurse at: [phone] [place]

## 2020-08-24 ENCOUNTER — ALLIED HEALTH/NURSE VISIT (OUTPATIENT)
Dept: ALLERGY | Facility: OTHER | Age: 40
End: 2020-08-24
Attending: PHYSICIAN ASSISTANT
Payer: COMMERCIAL

## 2020-08-24 DIAGNOSIS — J30.9 ALLERGIC RHINITIS: Primary | ICD-10-CM

## 2020-08-24 PROCEDURE — 95117 IMMUNOTHERAPY INJECTIONS: CPT

## 2020-08-24 NOTE — PROGRESS NOTES
Injectable Medication/Allergy injection Documentation    Patient was given 2 allergy injection(s).  Prior to medication administration, verified patients identity using patient s name and date of birth.  Patient instructed to wait 30 minutes to observe for reaction, but signed waiver and left AMA without observation.  Documentation is on allergy flowsheet.

## 2020-08-31 ENCOUNTER — ALLIED HEALTH/NURSE VISIT (OUTPATIENT)
Dept: ALLERGY | Facility: OTHER | Age: 40
End: 2020-08-31
Attending: PHYSICIAN ASSISTANT
Payer: COMMERCIAL

## 2020-08-31 DIAGNOSIS — J30.89 PERENNIAL ALLERGIC RHINITIS: Primary | ICD-10-CM

## 2020-08-31 PROCEDURE — 95117 IMMUNOTHERAPY INJECTIONS: CPT

## 2020-08-31 NOTE — PROGRESS NOTES
Patient identity is verified using name and date of birth.    Two allergy injections are given and charted in the allergy shadow chart.    The patient was asked to remain 30 mns for observation.  Patient declined, and left AMA.  Mila Miller LPN

## 2020-09-02 ENCOUNTER — ALLIED HEALTH/NURSE VISIT (OUTPATIENT)
Dept: ALLERGY | Facility: OTHER | Age: 40
End: 2020-09-02
Attending: PHYSICIAN ASSISTANT
Payer: COMMERCIAL

## 2020-09-02 DIAGNOSIS — J30.89 PERENNIAL ALLERGIC RHINITIS: Primary | ICD-10-CM

## 2020-09-02 PROCEDURE — 95165 ANTIGEN THERAPY SERVICES: CPT | Performed by: PHYSICIAN ASSISTANT

## 2020-09-02 NOTE — PROGRESS NOTES
Allergy serum is mixed today at Blue schedule 3 of 4, into two (5 ml) multi dose vial/vials.    Allergens included were:    Ragweed  0.2 ml of dilution # 1  Pigweed  0.2 ml of dilution # 1  Mugwort 0.2 ml of dilution # 0  Kochia  0.2 ml of dilution # 0  Russian Thistle 0.2 ml of dilution # 1  Alfonso Grass 0.2 ml of dilution # 1  Birch mix 0.2 ml of dilution # 2  Maple Mix 0.2 of dilution # 1  Elm Mix 0.2 ml of dilution # 0  Oak Mix 0.2 ml of dilution # 0  Toribio Mix 0.2 ml of dilution # 0  Pine Mix 0.2 ml of dilution # 0  [place] 0.2 ml of dilution # 1  Black [place] 0.2 ml of dilution # 1  Aspen 0.2 ml of dilution # 0  Red [place] 0.2 ml of dilution # 0    Alternaria 0.2 ml of dilution # 1  Aspergillus 0.2 ml of dilution # 1  Hormodendrum 0.2 ml of dilution # 1  Helminthosporium 0.2 ml of dilution # 1  Penicillium 0.2 ml of dilution # 1  Epicoccum 0.2 ml of dilution # 1  Fusarium 0.2 ml of dilution # 1  Mucor 0.2 ml of dilution # 0  Grain Smut 0.2 ml of dilution # 0  Grass Smut 0.2 ml of dilution # 0  Cat 0.2 ml of dilution # 2  Dog 0.2 ml of dilution # 1  Feather Mix 0.2 ml of dilution # 0  Dust Mite Mix 0.2 ml of dilution # 2  Horse 0.2 ml of dilution # 0    Maude Huber RN

## 2020-09-14 ENCOUNTER — ALLIED HEALTH/NURSE VISIT (OUTPATIENT)
Dept: ALLERGY | Facility: OTHER | Age: 40
End: 2020-09-14
Attending: PHYSICIAN ASSISTANT
Payer: COMMERCIAL

## 2020-09-14 DIAGNOSIS — J30.9 ALLERGIC RHINITIS: Primary | ICD-10-CM

## 2020-09-14 PROCEDURE — 95117 IMMUNOTHERAPY INJECTIONS: CPT

## 2020-09-14 NOTE — PROGRESS NOTES
Prior to injection patient identity is verified using patient's name and date of birth.    Safety vial test was done in the Right arm, for new Blue vial # 1.   Administered  0.01ml (ID) for SVT.  SVT = 7mm.   Pass.    Safety vial test was done in the Left arm, for new Blue vial # 2.   Administered  0.01ml (ID) for SVT.  SVT  =  8mm.    Pass.    2 Allergy injections were given and charted on the paper flow sheet.      Per orders of Pati Bradley, allergy injections are given by Nelida Hicks LPN    The patient signed out AMA.    Nelida Hicks LPN

## 2020-09-21 ENCOUNTER — ALLIED HEALTH/NURSE VISIT (OUTPATIENT)
Dept: ALLERGY | Facility: OTHER | Age: 40
End: 2020-09-21
Attending: PHYSICIAN ASSISTANT
Payer: COMMERCIAL

## 2020-09-21 DIAGNOSIS — J30.9 ALLERGIC RHINITIS: Primary | ICD-10-CM

## 2020-09-21 PROCEDURE — 95117 IMMUNOTHERAPY INJECTIONS: CPT

## 2020-09-28 ENCOUNTER — ALLIED HEALTH/NURSE VISIT (OUTPATIENT)
Dept: ALLERGY | Facility: OTHER | Age: 40
End: 2020-09-28
Attending: PHYSICIAN ASSISTANT
Payer: COMMERCIAL

## 2020-09-28 DIAGNOSIS — J30.9 ALLERGIC RHINITIS: Primary | ICD-10-CM

## 2020-09-28 PROCEDURE — 95117 IMMUNOTHERAPY INJECTIONS: CPT

## 2020-09-28 NOTE — PROGRESS NOTES
Allergy injection/s given and charted on paper allergy flow sheet.  Patient signed out AMA.    Nelida Hicks LPN

## 2020-10-05 ENCOUNTER — ALLIED HEALTH/NURSE VISIT (OUTPATIENT)
Dept: ALLERGY | Facility: OTHER | Age: 40
End: 2020-10-05
Attending: PHYSICIAN ASSISTANT
Payer: COMMERCIAL

## 2020-10-05 DIAGNOSIS — J30.9 ALLERGIC RHINITIS: Primary | ICD-10-CM

## 2020-10-05 PROCEDURE — 95117 IMMUNOTHERAPY INJECTIONS: CPT

## 2020-10-12 ENCOUNTER — ALLIED HEALTH/NURSE VISIT (OUTPATIENT)
Dept: ALLERGY | Facility: OTHER | Age: 40
End: 2020-10-12
Attending: PHYSICIAN ASSISTANT
Payer: COMMERCIAL

## 2020-10-12 DIAGNOSIS — J30.9 ALLERGIC RHINITIS: Primary | ICD-10-CM

## 2020-10-12 PROCEDURE — 95117 IMMUNOTHERAPY INJECTIONS: CPT

## 2020-10-19 ENCOUNTER — ALLIED HEALTH/NURSE VISIT (OUTPATIENT)
Dept: ALLERGY | Facility: OTHER | Age: 40
End: 2020-10-19
Attending: PHYSICIAN ASSISTANT
Payer: COMMERCIAL

## 2020-10-19 DIAGNOSIS — J30.9 ALLERGIC RHINITIS: Primary | ICD-10-CM

## 2020-10-19 PROCEDURE — 95117 IMMUNOTHERAPY INJECTIONS: CPT

## 2020-10-26 ENCOUNTER — ALLIED HEALTH/NURSE VISIT (OUTPATIENT)
Dept: ALLERGY | Facility: OTHER | Age: 40
End: 2020-10-26
Attending: PHYSICIAN ASSISTANT
Payer: COMMERCIAL

## 2020-10-26 DIAGNOSIS — J30.89 PERENNIAL ALLERGIC RHINITIS: Primary | ICD-10-CM

## 2020-10-26 PROCEDURE — 95117 IMMUNOTHERAPY INJECTIONS: CPT

## 2020-10-26 NOTE — PROGRESS NOTES
Prior to injection, verified patient's identity using patient's name and date of birth.    Allergy injection/s given and charted on paper allergy flow sheet.  Patient signed out AMA and did not stay for observation.    Maude Huber RN

## 2020-11-02 ENCOUNTER — ALLIED HEALTH/NURSE VISIT (OUTPATIENT)
Dept: ALLERGY | Facility: OTHER | Age: 40
End: 2020-11-02
Attending: PHYSICIAN ASSISTANT
Payer: COMMERCIAL

## 2020-11-02 ENCOUNTER — TELEPHONE (OUTPATIENT)
Dept: ALLERGY | Facility: OTHER | Age: 40
End: 2020-11-02

## 2020-11-02 DIAGNOSIS — J30.89 PERENNIAL ALLERGIC RHINITIS: Primary | ICD-10-CM

## 2020-11-02 PROCEDURE — 95117 IMMUNOTHERAPY INJECTIONS: CPT

## 2020-11-09 ENCOUNTER — ALLIED HEALTH/NURSE VISIT (OUTPATIENT)
Dept: ALLERGY | Facility: OTHER | Age: 40
End: 2020-11-09
Attending: PHYSICIAN ASSISTANT
Payer: COMMERCIAL

## 2020-11-09 DIAGNOSIS — J30.89 PERENNIAL ALLERGIC RHINITIS: Primary | ICD-10-CM

## 2020-11-09 PROCEDURE — 95117 IMMUNOTHERAPY INJECTIONS: CPT

## 2020-11-16 ENCOUNTER — ALLIED HEALTH/NURSE VISIT (OUTPATIENT)
Dept: ALLERGY | Facility: OTHER | Age: 40
End: 2020-11-16
Attending: PHYSICIAN ASSISTANT
Payer: COMMERCIAL

## 2020-11-16 ENCOUNTER — HOSPITAL ENCOUNTER (EMERGENCY)
Facility: HOSPITAL | Age: 40
Discharge: HOME | End: 2020-11-17
Attending: STUDENT IN AN ORGANIZED HEALTH CARE EDUCATION/TRAINING PROGRAM | Admitting: STUDENT IN AN ORGANIZED HEALTH CARE EDUCATION/TRAINING PROGRAM
Payer: COMMERCIAL

## 2020-11-16 ENCOUNTER — NURSE TRIAGE (OUTPATIENT)
Dept: NURSING | Facility: CLINIC | Age: 40
End: 2020-11-16

## 2020-11-16 DIAGNOSIS — T78.2XXA ANAPHYLAXIS, INITIAL ENCOUNTER: ICD-10-CM

## 2020-11-16 DIAGNOSIS — J30.89 PERENNIAL ALLERGIC RHINITIS: Primary | ICD-10-CM

## 2020-11-16 DIAGNOSIS — T78.40XA ALLERGIC REACTION: ICD-10-CM

## 2020-11-16 DIAGNOSIS — T78.40XA ALLERGIC REACTION, INITIAL ENCOUNTER: ICD-10-CM

## 2020-11-16 LAB
ALBUMIN SERPL-MCNC: 4 G/DL (ref 3.4–5)
ALP SERPL-CCNC: 69 U/L (ref 40–150)
ALT SERPL W P-5'-P-CCNC: 32 U/L (ref 0–50)
ANION GAP SERPL CALCULATED.3IONS-SCNC: 6 MMOL/L (ref 3–14)
AST SERPL W P-5'-P-CCNC: 21 U/L (ref 0–45)
BASOPHILS # BLD AUTO: 0.1 10E9/L (ref 0–0.2)
BASOPHILS NFR BLD AUTO: 0.6 %
BILIRUB SERPL-MCNC: 0.4 MG/DL (ref 0.2–1.3)
BUN SERPL-MCNC: 8 MG/DL (ref 7–30)
CALCIUM SERPL-MCNC: 8.3 MG/DL (ref 8.5–10.1)
CHLORIDE SERPL-SCNC: 108 MMOL/L (ref 94–109)
CO2 SERPL-SCNC: 25 MMOL/L (ref 20–32)
CREAT SERPL-MCNC: 0.74 MG/DL (ref 0.52–1.04)
DIFFERENTIAL METHOD BLD: NORMAL
EOSINOPHIL # BLD AUTO: 0.1 10E9/L (ref 0–0.7)
EOSINOPHIL NFR BLD AUTO: 1.5 %
ERYTHROCYTE [DISTWIDTH] IN BLOOD BY AUTOMATED COUNT: 12.2 % (ref 10–15)
GFR SERPL CREATININE-BSD FRML MDRD: >90 ML/MIN/{1.73_M2}
GLUCOSE SERPL-MCNC: 106 MG/DL (ref 70–99)
HCT VFR BLD AUTO: 38.4 % (ref 35–47)
HGB BLD-MCNC: 13.2 G/DL (ref 11.7–15.7)
IMM GRANULOCYTES # BLD: 0 10E9/L (ref 0–0.4)
IMM GRANULOCYTES NFR BLD: 0.4 %
LACTATE BLD-SCNC: 1.3 MMOL/L (ref 0.7–2)
LYMPHOCYTES # BLD AUTO: 2.4 10E9/L (ref 0.8–5.3)
LYMPHOCYTES NFR BLD AUTO: 28.3 %
MCH RBC QN AUTO: 29.1 PG (ref 26.5–33)
MCHC RBC AUTO-ENTMCNC: 34.4 G/DL (ref 31.5–36.5)
MCV RBC AUTO: 85 FL (ref 78–100)
MONOCYTES # BLD AUTO: 0.7 10E9/L (ref 0–1.3)
MONOCYTES NFR BLD AUTO: 8.3 %
NEUTROPHILS # BLD AUTO: 5.2 10E9/L (ref 1.6–8.3)
NEUTROPHILS NFR BLD AUTO: 60.9 %
NRBC # BLD AUTO: 0 10*3/UL
NRBC BLD AUTO-RTO: 0 /100
PLATELET # BLD AUTO: 240 10E9/L (ref 150–450)
POTASSIUM SERPL-SCNC: 3.6 MMOL/L (ref 3.4–5.3)
PROT SERPL-MCNC: 7.3 G/DL (ref 6.8–8.8)
RBC # BLD AUTO: 4.53 10E12/L (ref 3.8–5.2)
SODIUM SERPL-SCNC: 139 MMOL/L (ref 133–144)
WBC # BLD AUTO: 8.5 10E9/L (ref 4–11)

## 2020-11-16 PROCEDURE — 85025 COMPLETE CBC W/AUTO DIFF WBC: CPT | Performed by: STUDENT IN AN ORGANIZED HEALTH CARE EDUCATION/TRAINING PROGRAM

## 2020-11-16 PROCEDURE — 95117 IMMUNOTHERAPY INJECTIONS: CPT

## 2020-11-16 PROCEDURE — 96374 THER/PROPH/DIAG INJ IV PUSH: CPT

## 2020-11-16 PROCEDURE — 258N000003 HC RX IP 258 OP 636: Performed by: STUDENT IN AN ORGANIZED HEALTH CARE EDUCATION/TRAINING PROGRAM

## 2020-11-16 PROCEDURE — 96375 TX/PRO/DX INJ NEW DRUG ADDON: CPT

## 2020-11-16 PROCEDURE — 96372 THER/PROPH/DIAG INJ SC/IM: CPT | Mod: XS | Performed by: STUDENT IN AN ORGANIZED HEALTH CARE EDUCATION/TRAINING PROGRAM

## 2020-11-16 PROCEDURE — 96361 HYDRATE IV INFUSION ADD-ON: CPT

## 2020-11-16 PROCEDURE — 250N000011 HC RX IP 250 OP 636: Performed by: STUDENT IN AN ORGANIZED HEALTH CARE EDUCATION/TRAINING PROGRAM

## 2020-11-16 PROCEDURE — 83605 ASSAY OF LACTIC ACID: CPT | Performed by: STUDENT IN AN ORGANIZED HEALTH CARE EDUCATION/TRAINING PROGRAM

## 2020-11-16 PROCEDURE — 99291 CRITICAL CARE FIRST HOUR: CPT | Performed by: STUDENT IN AN ORGANIZED HEALTH CARE EDUCATION/TRAINING PROGRAM

## 2020-11-16 PROCEDURE — 250N000009 HC RX 250: Performed by: STUDENT IN AN ORGANIZED HEALTH CARE EDUCATION/TRAINING PROGRAM

## 2020-11-16 PROCEDURE — 99284 EMERGENCY DEPT VISIT MOD MDM: CPT | Mod: 25

## 2020-11-16 PROCEDURE — 80053 COMPREHEN METABOLIC PANEL: CPT | Performed by: STUDENT IN AN ORGANIZED HEALTH CARE EDUCATION/TRAINING PROGRAM

## 2020-11-16 RX ORDER — EPINEPHRINE 0.3 MG/.3ML
0.3 INJECTION SUBCUTANEOUS PRN
Qty: 2 EACH | Refills: 1 | Status: SHIPPED | OUTPATIENT
Start: 2020-11-16 | End: 2021-09-13

## 2020-11-16 RX ORDER — DEXAMETHASONE SODIUM PHOSPHATE 10 MG/ML
10 INJECTION, SOLUTION INTRAMUSCULAR; INTRAVENOUS ONCE
Status: COMPLETED | OUTPATIENT
Start: 2020-11-16 | End: 2020-11-16

## 2020-11-16 RX ORDER — DIPHENHYDRAMINE HYDROCHLORIDE 50 MG/ML
50 INJECTION INTRAMUSCULAR; INTRAVENOUS ONCE
Status: COMPLETED | OUTPATIENT
Start: 2020-11-16 | End: 2020-11-16

## 2020-11-16 RX ORDER — SODIUM CHLORIDE 9 MG/ML
INJECTION, SOLUTION INTRAVENOUS CONTINUOUS
Status: DISCONTINUED | OUTPATIENT
Start: 2020-11-16 | End: 2020-11-17 | Stop reason: HOSPADM

## 2020-11-16 RX ORDER — EPINEPHRINE 1 MG/ML
0.3 INJECTION, SOLUTION INTRAMUSCULAR; SUBCUTANEOUS ONCE
Status: COMPLETED | OUTPATIENT
Start: 2020-11-16 | End: 2020-11-16

## 2020-11-16 RX ORDER — MULTIPLE VITAMINS W/ MINERALS TAB 9MG-400MCG
1 TAB ORAL DAILY
COMMUNITY
End: 2021-11-27

## 2020-11-16 RX ADMIN — Medication 20 MG: at 22:00

## 2020-11-16 RX ADMIN — EPINEPHRINE 0.3 MG: 1 INJECTION INTRAMUSCULAR; INTRAVENOUS; SUBCUTANEOUS at 21:58

## 2020-11-16 RX ADMIN — SODIUM CHLORIDE 1000 ML: 9 INJECTION, SOLUTION INTRAVENOUS at 21:58

## 2020-11-16 RX ADMIN — DEXAMETHASONE SODIUM PHOSPHATE 10 MG: 10 INJECTION, SOLUTION INTRAMUSCULAR; INTRAVENOUS at 22:00

## 2020-11-16 RX ADMIN — DIPHENHYDRAMINE HYDROCHLORIDE 50 MG: 50 INJECTION, SOLUTION INTRAMUSCULAR; INTRAVENOUS at 22:00

## 2020-11-16 ASSESSMENT — ENCOUNTER SYMPTOMS
STRIDOR: 0
EYE DISCHARGE: 0
FLANK PAIN: 0
DIZZINESS: 0
NAUSEA: 1
WEAKNESS: 0
CHILLS: 0
NECK PAIN: 0
ABDOMINAL PAIN: 0
VOMITING: 0
DYSURIA: 0
SORE THROAT: 0
HEMATURIA: 0
EYE REDNESS: 0
VOICE CHANGE: 0
FEVER: 0
EYE PAIN: 0
COUGH: 0
FACIAL SWELLING: 0
CONSTIPATION: 0
RHINORRHEA: 0
SHORTNESS OF BREATH: 0
CHEST TIGHTNESS: 0
TROUBLE SWALLOWING: 1
HEADACHES: 0
WOUND: 0
CHOKING: 0
BACK PAIN: 0
DIARRHEA: 0
WHEEZING: 0
APNEA: 0
NUMBNESS: 0

## 2020-11-16 ASSESSMENT — MIFFLIN-ST. JEOR: SCORE: 1794.87

## 2020-11-16 NOTE — ED AVS SNAPSHOT
[facility]  [address]  [place] [address]  Phone: [phone]                                    Gwendolyn Dietrich   MRN: [id number]    Department: [facility]   Date of Visit: 11/16/2020           After Visit Summary Signature Page    I have received my discharge instructions, and my questions have been answered. I have discussed any challenges I see with this plan with the nurse or doctor.    ..........................................................................................................................................  Patient/Patient Representative Signature      ..........................................................................................................................................  Patient Representative Print Name and Relationship to Patient    ..................................................               ................................................  Date                                   Time    ..........................................................................................................................................  Reviewed by Signature/Title    ...................................................              ..............................................  Date                                               Time          22EPIC Rev 08/18

## 2020-11-17 ENCOUNTER — TELEPHONE (OUTPATIENT)
Dept: ALLERGY | Facility: OTHER | Age: 40
End: 2020-11-17

## 2020-11-17 VITALS
TEMPERATURE: 98.8 F | WEIGHT: 250 LBS | BODY MASS INDEX: 41.65 KG/M2 | DIASTOLIC BLOOD PRESSURE: 74 MMHG | HEIGHT: 65 IN | OXYGEN SATURATION: 96 % | HEART RATE: 89 BPM | SYSTOLIC BLOOD PRESSURE: 126 MMHG | RESPIRATION RATE: 18 BRPM

## 2020-11-17 NOTE — ED PROVIDER NOTES
History     Chief Complaint   Patient presents with     Allergic Reaction     allergy shots at 1530, reaction started at 1730, c/o sore throat and throat tightness at 2000 and worsening.     HPI  Gwendolyn Dietrich is a 40 year old female who presents over concern of allergic reaction, patient had allergy shots given earlier today, states she is had prior episodes of anaphylaxis, she reports difficulty swallowing, increased work of breathing and swelling within her neck.  In her prior episodes she wanted to come to the emergency department before her symptoms became unmanageable.  Patient denies any other significant medical history, denies any other symptoms at this time.    Allergies:  Allergies   Allergen Reactions     Codeine GI Disturbance     Seasonal Allergies        Problem List:    Patient Active Problem List    Diagnosis Date Noted     Dyslipidemia 01/23/2018     Priority: Medium     Migraine headache 01/23/2018     Priority: Medium     Overview:   Recurrent migraine and tension headaches       ACP (advance care planning) 11/01/2016     Priority: Medium     Advance Care Planning 11/1/2016: ACP Review of Chart / Resources Provided:  Reviewed chart for advance care plan.  Gwendolyn Dietrich has no plan or code status on file. Discussed available resources and provided with information. Confirmed code status reflects current choices pending further ACP discussions.  Confirmed/documented legally designated decision makers.  Added by MARAH CALDERON             Pain in joint 12/10/2013     Priority: Medium     Obesity 05/08/2013     Priority: Medium     Hereditary and idiopathic peripheral neuropathy 09/09/2011     Priority: Medium        Past Medical History:    Past Medical History:   Diagnosis Date     Obesity 5/8/2013       Past Surgical History:    Past Surgical History:   Procedure Laterality Date     ABDOMEN SURGERY  2012    Partial  hysterectomy     BACK SURGERY  2005     BIOPSY OF SKIN LESION        CHOLECYSTECTOMY  2011     HYSTERECTOMY  2012    menorrhagia, precancerous cells     ORTHOPEDIC SURGERY  2005    back surgery     TONSILLECTOMY       TUBAL LIGATION  2007       Family History:    Family History   Problem Relation Age of Onset     Hearing Loss Father      Lipids Father      Hypertension Father      Diabetes Father      Lipids Mother      Hypertension Mother      Irritable Bowel Syndrome Mother      Obesity Mother      Rheumatoid Arthritis Mother      Diabetes Mother      Cancer Maternal Grandmother      Osteoporosis Maternal Grandmother      Cancer Paternal Grandmother        Social History:  Marital Status:   [2]  Social History     Tobacco Use     Smoking status: Former Smoker     Types: Cigarettes     Quit date: 2008     Years since quittin.5     Smokeless tobacco: Never Used   Substance Use Topics     Alcohol use: No     Drug use: No        Medications:         EPINEPHrine (ANY BX GENERIC EQUIV) 0.3 MG/0.3ML injection 2-pack       fexofenadine (ALLEGRA) 180 MG tablet       multivitamin w/minerals (THERA-VIT-M) tablet       ORDER FOR ALLERGEN IMMUNOTHERAPY          Review of Systems   Constitutional: Negative for chills and fever.   HENT: Positive for trouble swallowing. Negative for ear pain, facial swelling, rhinorrhea, sore throat, tinnitus and voice change.    Eyes: Negative for pain, discharge and redness.   Respiratory: Negative for apnea, cough, choking, chest tightness, shortness of breath, wheezing and stridor.    Cardiovascular: Negative for chest pain.   Gastrointestinal: Positive for nausea. Negative for abdominal pain, constipation, diarrhea and vomiting.   Genitourinary: Negative for dysuria, flank pain, hematuria, vaginal bleeding and vaginal discharge.   Musculoskeletal: Negative for back pain and neck pain.   Skin: Negative for rash and wound.   Allergic/Immunologic: Positive for environmental allergies.   Neurological: Negative for dizziness, weakness, numbness  "and headaches.       Physical Exam   BP: 173/99  Pulse: 73  Temp: 99  F (37.2  C)  Resp: 22  Height: 165.1 cm (5' 5\")  Weight: 113.4 kg (250 lb)  SpO2: 97 %      Physical Exam  Constitutional:       General: She is not in acute distress.     Appearance: Normal appearance. She is not diaphoretic.   HENT:      Head: Normocephalic and atraumatic.      Comments: Mild posterior oropharyngeal swelling, she is able to control her secretions.     Nose: Nose normal.      Mouth/Throat:      Mouth: Mucous membranes are moist.      Pharynx: Oropharynx is clear. No oropharyngeal exudate.   Eyes:      General: Lids are normal. No scleral icterus.     Extraocular Movements: Extraocular movements intact.      Conjunctiva/sclera: Conjunctivae normal.      Pupils: Pupils are equal, round, and reactive to light.   Neck:      Musculoskeletal: Full passive range of motion without pain, normal range of motion and neck supple.      Comments: No noted stridor  Cardiovascular:      Rate and Rhythm: Normal rate and regular rhythm.      Pulses: Normal pulses.      Heart sounds: Normal heart sounds. No murmur. No friction rub. No gallop.    Pulmonary:      Effort: Pulmonary effort is normal. No respiratory distress.      Breath sounds: Normal breath sounds. No stridor. No wheezing, rhonchi or rales.   Abdominal:      General: Bowel sounds are normal.      Palpations: Abdomen is soft.      Tenderness: There is no abdominal tenderness.   Musculoskeletal: Normal range of motion.         General: No tenderness.   Skin:     General: Skin is warm and dry.      Capillary Refill: Capillary refill takes less than 2 seconds.      Findings: No rash.   Neurological:      General: No focal deficit present.      Mental Status: She is oriented to person, place, and time. Mental status is at baseline.      GCS: GCS eye subscore is 4. GCS verbal subscore is 5. GCS motor subscore is 6.   Psychiatric:         Attention and Perception: Attention normal.         " Mood and Affect: Mood normal.         Speech: Speech normal.         Behavior: Behavior normal.         ED Course     ED Course as of Nov 17 0019   Mon Nov 16, 2020 2240 Not  elevated   Lactic Acid: 1.3   2240 No noted leukocytosis   WBC: 8.5   2242 No noted anemia   Hemoglobin: 13.2   2242 Normal renal function   Creatinine: 0.74     Procedures        Critical Care time:  was 30 minutes for this patient excluding procedures.           Results for orders placed or performed during the hospital encounter of 11/16/20 (from the past 24 hour(s))   CBC with platelets differential   Result Value Ref Range    WBC 8.5 4.0 - 11.0 10e9/L    RBC Count 4.53 3.8 - 5.2 10e12/L    Hemoglobin 13.2 11.7 - 15.7 g/dL    Hematocrit 38.4 35.0 - 47.0 %    MCV 85 78 - 100 fl    MCH 29.1 26.5 - 33.0 pg    MCHC 34.4 31.5 - 36.5 g/dL    RDW 12.2 10.0 - 15.0 %    Platelet Count 240 150 - 450 10e9/L    Diff Method Automated Method     % Neutrophils 60.9 %    % Lymphocytes 28.3 %    % Monocytes 8.3 %    % Eosinophils 1.5 %    % Basophils 0.6 %    % Immature Granulocytes 0.4 %    Nucleated RBCs 0 0 /100    Absolute Neutrophil 5.2 1.6 - 8.3 10e9/L    Absolute Lymphocytes 2.4 0.8 - 5.3 10e9/L    Absolute Monocytes 0.7 0.0 - 1.3 10e9/L    Absolute Eosinophils 0.1 0.0 - 0.7 10e9/L    Absolute Basophils 0.1 0.0 - 0.2 10e9/L    Abs Immature Granulocytes 0.0 0 - 0.4 10e9/L    Absolute Nucleated RBC 0.0    Comprehensive metabolic panel   Result Value Ref Range    Sodium 139 133 - 144 mmol/L    Potassium 3.6 3.4 - 5.3 mmol/L    Chloride 108 94 - 109 mmol/L    Carbon Dioxide 25 20 - 32 mmol/L    Anion Gap 6 3 - 14 mmol/L    Glucose 106 (H) 70 - 99 mg/dL    Urea Nitrogen 8 7 - 30 mg/dL    Creatinine 0.74 0.52 - 1.04 mg/dL    GFR Estimate >90 >60 mL/min/[1.73_m2]    GFR Estimate If Black >90 >60 mL/min/[1.73_m2]    Calcium 8.3 (L) 8.5 - 10.1 mg/dL    Bilirubin Total 0.4 0.2 - 1.3 mg/dL    Albumin 4.0 3.4 - 5.0 g/dL    Protein Total 7.3 6.8 - 8.8 g/dL     Alkaline Phosphatase 69 40 - 150 U/L    ALT 32 0 - 50 U/L    AST 21 0 - 45 U/L   Lactic acid whole blood   Result Value Ref Range    Lactic Acid 1.3 0.7 - 2.0 mmol/L       Medications   0.9% sodium chloride BOLUS (0 mLs Intravenous Stopped 11/16/20 2245)     Followed by   sodium chloride 0.9% infusion (has no administration in time range)   famotidine (PEPCID) injection 20 mg (20 mg Intravenous Given 11/16/20 2200)   dexamethasone PF (DECADRON) injection 10 mg (10 mg Intravenous Given 11/16/20 2200)   diphenhydrAMINE (BENADRYL) injection 50 mg (50 mg Intravenous Given 11/16/20 2200)   EPINEPHrine (Anaphylaxis) (ADRENALIN) injection (vial) 0.3 mg (0.3 mg Intramuscular Given 11/16/20 2158)       Assessments & Plan (with Medical Decision Making)     This is a 40 year old female who presents to the ED for evaluation of a potential allergic reaction. She is currently symptomatic. Symptoms included swelling in her neck, and difficulty swallowing. She rapidly progressed. This presentation is consistent with anaphylaxis. Epinephrine was administered. Etiologies considered include possible exposures including food, environmental, insect envenomation, as well as other histaminergic situations such as exercise, cold exposure.   Initial management included epinephrine, diphenhydramine, dexamethasone, famotidine and monitoring. The patient will be observed in the ED for improvement or decline. Disposition pending clinical course. Consideration of dismissal with EpiPen autoinjector.     Blood work is reassuring, patient symptoms have subsided, after little over 2 hours of observation, patient's symptoms have not worsened, at this point the steroids have taken effect, I am reassured, had a conversation with the patient regarding the risks and benefits of further observation versus her going home, patient strongly favor going home at this time, feels comfortable as she has an EpiPen from her allergist which she obtained today.   Think it is reasonable to have her follow-up and return should there be any worsening otherwise follow-up with her allergist.    The patient's workup and evaluation during their Emergency Department stay was reviewed with the patient. She is comfortable going home based on our discussion with her. They are amenable to this plan. They will follow up with PCP in 3 days time. The signs/symptoms to prompt return to the Emergency Department were discussed with the patient and they expressed understanding. All questions were answered.       I have reviewed the nursing notes.    I have reviewed the findings, diagnosis, plan and need for follow up with the patient.     Critical Care Addendum    My initial assessment, based on my review of nursing observations, review of vital signs, focused history, physical exam and and medication for anaphylaxis, established that Gwendolyn Dietrich has and concern for anaphylaxis, which requires immediate intervention, and therefore she is critically ill.     After the initial assessment, the care team initiated multiple lab tests, initiated IV fluid administration and initiated medication therapy with epipen, diphenhydramine, famotidine, dexamethasone to provide stabilization care. Due to the critical nature of this patient, I reassessed nursing observations, vital signs, physical exam, review of cardiac rhythm monitor, interpretation of labs, mental status, neurologic status and respiratory status multiple times prior to her disposition.     Time also spent performing documentation, discussion with family to obtain medical information for decision making, reviewing test results and coordination of care.     Critical care time (excluding teaching time and procedures): 30 minutes.     New Prescriptions    No medications on file       Final diagnoses:   Allergic reaction, initial encounter   Anaphylaxis, initial encounter       11/16/2020   [facility]     Frankie Duke,  MD  11/17/20 0019

## 2020-11-17 NOTE — TELEPHONE ENCOUNTER
Patient calling and was in the clinic today for allergy shots.  The area on her left arm of the shot, which was for cat, birch, and dust, has swollen up to the size of a silver dollar.  She also reports a tighness feeling in her throat, but is still able to swallow okay, headache and almost threw up a little bit ago.    Gee has a history of 2 anaphylactic reactions due to environmental allergies, carries an epi pen.    Advised patient that she should be seen in the ER.    COVID 19 Nurse Triage Plan/Patient Instructions    Please be aware that novel coronavirus (COVID-19) may be circulating in the community. If you develop symptoms such as fever, cough, or SOB or if you have concerns about the presence of another infection including coronavirus (COVID-19), please contact your health care provider or visit [email or web address].     Disposition/Instructions    [facility] Reference Visit Selection Guide.    Thank you for taking steps to prevent the spread of this virus.  o Limit your contact with others.  o Wear a simple mask to cover your cough.  o Wash your hands well and often.    Resources    [facility]: About COVID-19: [email or web address]    CDC: What to Do If You're Sick: www.cdc.gov/coronavirus/2019-ncov/about/steps-when-sick.html    CDC: Ending Home Isolation: www.cdc.gov/coronavirus/2019-ncov/hcp/disposition-in-home-patients.html     CDC: Caring for Someone: www.cdc.gov/coronavirus/2019-ncov/if-you-are-sick/care-for-someone.html     [facility]: Interim Guidance for Hospital Discharge to Home: www.health.[facility].[place]/diseases/coronavirus/hcp/hospdischarge.pdf    [facility] clinical trials (COVID-19 research studies): [email or web address].[facility]-clinical-trials     Below are the COVID-19 hotlines at the [place] Department of Health ([facility]). Interpreters are available.   o For health questions: Call 378-756-2062 or 1-260.102.2155 (7 a.m. to 7 p.m.)  o For questions about schools  and childcare: Call 731-304-6793 or 1-910.542.8549 (7 a.m. to 7 p.m.)                         Reason for Disposition    [1] Vomiting or abdominal cramps AND [2] onset < 2 hours of exposure to high-risk allergen  (e.g., sting, nuts, 1st dose of antibiotic)    Additional Information    Negative: [1] Life-threatening reaction in the past to similar substance (e.g., food, insect bite/sting, medication, etc.) AND [2] < 2 hours since exposure    Negative: Wheezing, stridor, hoarseness, or difficulty breathing    Negative: [1] Tightness in the chest or throat AND [2] begins within 2 hours of exposure to allergic substance    Negative: Difficulty swallowing, drooling or slurred speech    Negative: Difficult to awaken or acting confused (e.g., disoriented, slurred speech)    Negative: Unresponsive, passed out or very weak    Negative: Other symptom of severe allergic reaction (Exception: Hives or facial swelling alone)    Negative: Sounds like a life-threatening emergency to the triager    Negative: [1] Widespread hives AND [2] onset > 2 hours after exposure to high-risk allergen (e.g., sting, nuts, 1st dose of antibiotic)    Negative: [1] Widespread itching AND [2] onset > 2 hours after exposure to high-risk allergen (e.g., sting, nuts, 1st dose of antibiotic)    Negative: [1] Face swelling AND [2] onset > 2 hours after exposure to high-risk allergen (e.g., sting, nuts, 1st dose of antibiotic)    Negative: [1] Widespread hives, itching or facial swelling AND [2] onset < 2 hours of exposure to high-risk allergen (e.g., sting, nuts, 1st dose of antibiotic)    Protocols used: [id number]

## 2020-11-17 NOTE — TELEPHONE ENCOUNTER
Patient called to report that she had an anaphlyactic reaction yesterday and was treated in the emergency department.  Patient received her allergy injections yesterday.  She received 0.5 mL Blue vial (patient has 2 vials so receives 2 injections each time).  This was her final dose from the Blue vial and would be advancing to Green vial next.  Patient takes Allegra daily.    She notes she initially had a red, raised area on her left arm and then noticed her throat was starting to feel tight.  She had her daughter drive her to the emergency department.  She did not administer epinephrine at home.  Please see emergency department notes for full course of treatment while there.    Patient notes today that she feels tired and has a headache, but no other complaints.  She is aware she should seek emergency care if she has any signs/symptoms of anaphylaxis.      Please advise of plan for future allergy injections, if you need to see the patient in clinic, etc.  Paper chart is also forwarded for review.    Maude Huber RN

## 2020-11-17 NOTE — ED NOTES
"Pt presents with c/o allergic reaction. Pt states she received allergy shots at 1530 today, started noticing one of the injection sites, \"itching like crazy.\" then around 2000, pt developed sore throat, \"it feels like I have a ring around it, and now I have a bad headache.\" pt denies feeling SOB.  "

## 2020-11-17 NOTE — DISCHARGE INSTRUCTIONS
Please take all medications as prescribed and instructed. Please follow up with your primary care provider as discussed, return to the Emergency Department should your symptoms worsen or change.

## 2020-11-17 NOTE — ED NOTES
Pt denies sore or swollen feeling in throat. Generalized itching has disappeared. Pt states she wants to go home.  VSS.

## 2020-11-18 NOTE — TELEPHONE ENCOUNTER
Patient would like to continue with SCIT.  Advised she will repeat current blue vial.  Patient verbalized understanding.    Maude Huber RN

## 2020-11-18 NOTE — TELEPHONE ENCOUNTER
Does she wish to continue SCIT?   Or would she be interested in changing to SLIT?   Further, if she does wish to continue with SCIT- would need to repeat current vial. TR

## 2020-11-23 ENCOUNTER — ALLIED HEALTH/NURSE VISIT (OUTPATIENT)
Dept: ALLERGY | Facility: OTHER | Age: 40
End: 2020-11-23
Attending: PHYSICIAN ASSISTANT
Payer: COMMERCIAL

## 2020-11-23 DIAGNOSIS — J30.89 PERENNIAL ALLERGIC RHINITIS: Primary | ICD-10-CM

## 2020-11-23 PROCEDURE — 95117 IMMUNOTHERAPY INJECTIONS: CPT

## 2020-11-30 ENCOUNTER — ALLIED HEALTH/NURSE VISIT (OUTPATIENT)
Dept: ALLERGY | Facility: OTHER | Age: 40
End: 2020-11-30
Attending: PHYSICIAN ASSISTANT
Payer: COMMERCIAL

## 2020-11-30 DIAGNOSIS — J30.89 PERENNIAL ALLERGIC RHINITIS: Primary | ICD-10-CM

## 2020-11-30 PROCEDURE — 95117 IMMUNOTHERAPY INJECTIONS: CPT

## 2020-12-07 ENCOUNTER — ALLIED HEALTH/NURSE VISIT (OUTPATIENT)
Dept: ALLERGY | Facility: OTHER | Age: 40
End: 2020-12-07
Attending: PHYSICIAN ASSISTANT
Payer: COMMERCIAL

## 2020-12-07 DIAGNOSIS — J30.9 ALLERGIC RHINITIS: Primary | ICD-10-CM

## 2020-12-07 PROCEDURE — 95117 IMMUNOTHERAPY INJECTIONS: CPT

## 2020-12-07 NOTE — PROGRESS NOTES
Prior to injection, the patient's identity was verified using the patient's name and date of birth.    Per orders of TANNA Martinez, allergy injections are given.    2 allergy injections are given and charted in the allergy flow sheet.      The patient was instructed to remain in the clinic for 30 minutes after injections, to report any adverse reactions.      The patient signed out AMA and did not remain for observation.  Nataliya Haddad RN

## 2020-12-08 ENCOUNTER — NURSE TRIAGE (OUTPATIENT)
Dept: FAMILY MEDICINE | Facility: OTHER | Age: 40
End: 2020-12-08

## 2020-12-08 ENCOUNTER — OFFICE VISIT (OUTPATIENT)
Dept: FAMILY MEDICINE | Facility: OTHER | Age: 40
End: 2020-12-08
Attending: FAMILY MEDICINE
Payer: COMMERCIAL

## 2020-12-08 DIAGNOSIS — Z20.822 SUSPECTED 2019 NOVEL CORONAVIRUS INFECTION: Primary | ICD-10-CM

## 2020-12-08 PROCEDURE — U0003 INFECTIOUS AGENT DETECTION BY NUCLEIC ACID (DNA OR RNA); SEVERE ACUTE RESPIRATORY SYNDROME CORONAVIRUS 2 (SARS-COV-2) (CORONAVIRUS DISEASE [COVID-19]), AMPLIFIED PROBE TECHNIQUE, MAKING USE OF HIGH THROUGHPUT TECHNOLOGIES AS DESCRIBED BY CMS-2020-01-R: HCPCS | Performed by: FAMILY MEDICINE

## 2020-12-08 NOTE — TELEPHONE ENCOUNTER
Reason for Disposition    [1] COVID-19 infection suspected by caller or triager AND [2] mild symptoms (cough, fever, or others) AND [3] no complications or SOB    Additional Information    Negative: SEVERE difficulty breathing (e.g., struggling for each breath, speaks in single words)    Negative: Difficult to awaken or acting confused (e.g., disoriented, slurred speech)    Negative: Bluish (or gray) lips or face now    Negative: Shock suspected (e.g., cold/pale/clammy skin, too weak to stand, low BP, rapid pulse)    Negative: Sounds like a life-threatening emergency to the triager    Negative: [1] COVID-19 exposure AND [2] no symptoms    Negative: COVID-19 and Breastfeeding, questions about    Negative: [1] Adult with possible COVID-19 symptoms AND [2] triager concerned about severity of symptoms or other causes    Negative: SEVERE or constant chest pain or pressure (Exception: mild central chest pain, present only when coughing)    Negative: MODERATE difficulty breathing (e.g., speaks in phrases, SOB even at rest, pulse 100-120)    Negative: Patient sounds very sick or weak to the triager    Negative: MILD difficulty breathing (e.g., minimal/no SOB at rest, SOB with walking, pulse <100)    Negative: Chest pain or pressure    Negative: Fever > 103 F (39.4 C)    Negative: [1] Fever > 101 F (38.3 C) AND [2] age > 60    Negative: [1] Fever > 100.0 F (37.8 C) AND [2] bedridden (e.g., nursing home patient, CVA, chronic illness, recovering from surgery)    Negative: HIGH RISK patient (e.g., age > 64 years, diabetes, heart or lung disease, weak immune system) (Exception: Has already been evaluated by healthcare provider and has no new or worsening symptoms)    Negative: Fever present > 3 days (72 hours)    Negative: [1] Fever returns after gone for over 24 hours AND [2] symptoms worse or not improved    Negative: [1] Continuous (nonstop) coughing interferes with work or school AND [2] no improvement using cough  "treatment per protocol    Answer Assessment - Initial Assessment Questions  1. COVID-19 DIAGNOSIS: \"Who made your Coronavirus (COVID-19) diagnosis?\" \"Was it confirmed by a positive lab test?\" If not diagnosed by a HCP, ask \"Are there lots of cases (community spread) where you live?\" (See [facility] health department website, if unsure)      Not diagnosed  2. ONSET: \"When did the COVID-19 symptoms start?\"       Saturday  3. WORST SYMPTOM: \"What is your worst symptom?\" (e.g., cough, fever, shortness of breath, muscle aches)      Headache, body aches, sore throat, loss of taste  4. COUGH: \"Do you have a cough?\" If so, ask: \"How bad is the cough?\"        no  5. FEVER: \"Do you have a fever?\" If so, ask: \"What is your temperature, how was it measured, and when did it start?\"      100.5 tympanic and temporal. Last night  6. RESPIRATORY STATUS: \"Describe your breathing?\" (e.g., shortness of breath, wheezing, unable to speak)       alright  7. BETTER-SAME-WORSE: \"Are you getting better, staying the same or getting worse compared to yesterday?\"  If getting worse, ask, \"In what way?\"      A little worse. Worse sore throat and headache  8. HIGH RISK DISEASE: \"Do you have any chronic medical problems?\" (e.g., asthma, heart or lung disease, weak immune system, etc.)      No  9. PREGNANCY: \"Is there any chance you are pregnant?\" \"When was your last menstrual period?\"      no  10. OTHER SYMPTOMS: \"Do you have any other symptoms?\"  (e.g., chills, fatigue, headache, loss of smell or taste, muscle pain, sore throat)        fatigue    Protocols used: CORONAVIRUS (COVID-19) DIAGNOSED OR [id number] 8.4.20      "

## 2020-12-09 LAB
SARS-COV-2 RNA SPEC QL NAA+PROBE: NOT DETECTED
SPECIMEN SOURCE: NORMAL

## 2020-12-20 ENCOUNTER — HEALTH MAINTENANCE LETTER (OUTPATIENT)
Age: 40
End: 2020-12-20

## 2020-12-21 ENCOUNTER — ALLIED HEALTH/NURSE VISIT (OUTPATIENT)
Dept: ALLERGY | Facility: OTHER | Age: 40
End: 2020-12-21
Attending: PHYSICIAN ASSISTANT
Payer: COMMERCIAL

## 2020-12-21 DIAGNOSIS — J30.89 PERENNIAL ALLERGIC RHINITIS: Primary | ICD-10-CM

## 2020-12-21 PROCEDURE — 95117 IMMUNOTHERAPY INJECTIONS: CPT

## 2020-12-28 ENCOUNTER — ALLIED HEALTH/NURSE VISIT (OUTPATIENT)
Dept: ALLERGY | Facility: OTHER | Age: 40
End: 2020-12-28
Attending: PHYSICIAN ASSISTANT
Payer: COMMERCIAL

## 2020-12-28 DIAGNOSIS — J30.89 PERENNIAL ALLERGIC RHINITIS: Primary | ICD-10-CM

## 2020-12-28 PROCEDURE — 95117 IMMUNOTHERAPY INJECTIONS: CPT

## 2021-01-04 ENCOUNTER — ALLIED HEALTH/NURSE VISIT (OUTPATIENT)
Dept: ALLERGY | Facility: OTHER | Age: 41
End: 2021-01-04
Attending: FAMILY MEDICINE
Payer: COMMERCIAL

## 2021-01-04 DIAGNOSIS — J30.89 PERENNIAL ALLERGIC RHINITIS: Primary | ICD-10-CM

## 2021-01-04 PROCEDURE — 95117 IMMUNOTHERAPY INJECTIONS: CPT

## 2021-01-11 ENCOUNTER — ALLIED HEALTH/NURSE VISIT (OUTPATIENT)
Dept: ALLERGY | Facility: OTHER | Age: 41
End: 2021-01-11
Attending: FAMILY MEDICINE
Payer: COMMERCIAL

## 2021-01-11 DIAGNOSIS — J30.89 PERENNIAL ALLERGIC RHINITIS: Primary | ICD-10-CM

## 2021-01-11 PROCEDURE — 95117 IMMUNOTHERAPY INJECTIONS: CPT

## 2021-01-18 ENCOUNTER — ALLIED HEALTH/NURSE VISIT (OUTPATIENT)
Dept: ALLERGY | Facility: OTHER | Age: 41
End: 2021-01-18
Attending: FAMILY MEDICINE
Payer: COMMERCIAL

## 2021-01-18 DIAGNOSIS — J30.9 ALLERGIC RHINITIS: Primary | ICD-10-CM

## 2021-01-18 PROCEDURE — 95117 IMMUNOTHERAPY INJECTIONS: CPT

## 2021-01-18 NOTE — PROGRESS NOTES
Allergy injection/s given and charted on paper allergy flow sheet.  Patient signed out AMA.  Monse Haile LPN

## 2021-01-20 ENCOUNTER — ALLIED HEALTH/NURSE VISIT (OUTPATIENT)
Dept: ALLERGY | Facility: OTHER | Age: 41
End: 2021-01-20
Attending: PHYSICIAN ASSISTANT
Payer: COMMERCIAL

## 2021-01-20 DIAGNOSIS — J30.89 PERENNIAL ALLERGIC RHINITIS: Primary | ICD-10-CM

## 2021-01-20 PROCEDURE — 95165 ANTIGEN THERAPY SERVICES: CPT | Performed by: PHYSICIAN ASSISTANT

## 2021-01-20 NOTE — PROGRESS NOTES
Allergy serum is mixed today at Blue schedule 3 of 4, into two (5 ml) multi dose vial/vials.    Allergens included were:    Ragweed  0.2 ml of dilution # 1  Pigweed  0.2 ml of dilution # 1  Mugwort 0.2 ml of dilution # 0  Kochia  0.2 ml of dilution # 0  Russian Thistle 0.2 ml of dilution # 1  Alfonso Grass 0.2 ml of dilution # 1  Birch mix 0.2 ml of dilution # 2  Maple Mix 0.2 of dilution # 1  Elm Mix 0.2 ml of dilution # 0  Oak Mix 0.2 ml of dilution # 0  Toribio Mix 0.2 ml of dilution # 0  Pine Mix 0.2 ml of dilution # 0  Eastern Tripp 0.2 ml of dilution # 1  Black Raymond 0.2 ml of dilution # 1  Aspen 0.2 ml of dilution # 0  Red [place] 0.2 ml of dilution # 0    Alternaria 0.2 ml of dilution # 1  Aspergillus 0.2 ml of dilution # 1  Hormodendrum 0.2 ml of dilution # 1  Helminthosporium 0.2 ml of dilution # 1  Penicillium 0.2 ml of dilution # 1  Epicoccum 0.2 ml of dilution # 1  Fusarium 0.2 ml of dilution # 1  Mucor 0.2 ml of dilution # 0  Grain Smut 0.2 ml of dilution # 0  Grass Smut 0.2 ml of dilution # 0  Cat 0.2 ml of dilution # 2  Dog 0.2 ml of dilution # 1  Feather Mix 0.2 ml of dilution # 0  Dust Mite Mix 0.2 ml of dilution # 2  Horse 0.2 ml of dilution # 0    Maude Huber RN

## 2021-01-25 ENCOUNTER — ALLIED HEALTH/NURSE VISIT (OUTPATIENT)
Dept: ALLERGY | Facility: OTHER | Age: 41
End: 2021-01-25
Attending: FAMILY MEDICINE
Payer: COMMERCIAL

## 2021-01-25 DIAGNOSIS — J30.89 PERENNIAL ALLERGIC RHINITIS: Primary | ICD-10-CM

## 2021-01-25 PROCEDURE — 95117 IMMUNOTHERAPY INJECTIONS: CPT

## 2021-01-25 NOTE — PROGRESS NOTES
Prior to injection, verified patient's identity using patient's name and date of birth.    Safety vial test was done in the left arm, for new Blue vial # 1.   Administered  0.01ml (ID) for SVT.  SVT = 7 mm.   Passed.    Safety vial test was done in the right arm, for new Blue vial # 2.   Administered  0.01ml (ID) for SVT.  SVT = 7 mm.   Passed.    Allergy injection/s given and charted on paper allergy flow sheet.  Patient signed out AMA and did not stay for observation.    Maude Huber RN

## 2021-02-01 ENCOUNTER — ALLIED HEALTH/NURSE VISIT (OUTPATIENT)
Dept: ALLERGY | Facility: OTHER | Age: 41
End: 2021-02-01
Attending: FAMILY MEDICINE
Payer: COMMERCIAL

## 2021-02-01 DIAGNOSIS — J30.89 PERENNIAL ALLERGIC RHINITIS: Primary | ICD-10-CM

## 2021-02-01 PROCEDURE — 95117 IMMUNOTHERAPY INJECTIONS: CPT

## 2021-02-01 NOTE — PROGRESS NOTES
Prior to injection verified pt identity using pt name and date of birth.    Allergy injection/s given and charted on paper allergy flow sheet.  Patient left AMA, signing form, not staying for the observation period.    Dominik Bull RN

## 2021-02-08 ENCOUNTER — ALLIED HEALTH/NURSE VISIT (OUTPATIENT)
Dept: ALLERGY | Facility: OTHER | Age: 41
End: 2021-02-08
Attending: PHYSICIAN ASSISTANT
Payer: COMMERCIAL

## 2021-02-08 DIAGNOSIS — J30.89 PERENNIAL ALLERGIC RHINITIS: Primary | ICD-10-CM

## 2021-02-08 PROCEDURE — 95117 IMMUNOTHERAPY INJECTIONS: CPT

## 2021-02-10 ENCOUNTER — ALLIED HEALTH/NURSE VISIT (OUTPATIENT)
Dept: ALLERGY | Facility: OTHER | Age: 41
End: 2021-02-10
Attending: PHYSICIAN ASSISTANT
Payer: COMMERCIAL

## 2021-02-10 DIAGNOSIS — J30.89 PERENNIAL ALLERGIC RHINITIS: Primary | ICD-10-CM

## 2021-02-10 PROCEDURE — 95165 ANTIGEN THERAPY SERVICES: CPT | Performed by: PHYSICIAN ASSISTANT

## 2021-02-10 NOTE — PROGRESS NOTES
Allergy serum is mixed today at [facility],  into  2  (5 ml)  multi dose vial/vials.    Allergens included were:    Ragweed  0.2 ml of dilution # 1  Pigweed  0.2 ml of dilution # 1  Mugwort 0.2 ml of dilution # 0  Kochia  0.2 ml of dilution # 0  Russian Thistle 0.2 ml of dilution # 1  Alfonso Grass 0.2 ml of dilution # 1  Birch mix 0.2 ml of dilution # 1  Maple Mix 0.2 of dilution # 1  Elm Mix 0.2 ml of dilution # 0  Oak Mix 0.2 ml of dilution # 0  Toribio Mix 0.2 ml of dilution # 0  Pine Mix 0.2 ml of dilution # 0  Eastern [place] 0.2 ml of dilution # 1  Black [place] 0.2 ml of dilution # 1  Aspen 0.2 ml of dilution # 0  Red [place] 0.2 ml of dilution # 0    Alternaria 0.2 ml of dilution # 1  Aspergillus 0.2 ml of dilution # 1  Hormodendrum 0.2 ml of dilution # 1  Helminthosporium 0.2 ml of dilution # 1  Penicillium 0.2 ml of dilution # 1  Epicoccum 0.2 ml of dilution # 1  Fusarium 0.2 ml of dilution # 1  Mucor 0.2 ml of dilution # 0  Grain Smut 0.2 ml of dilution # 0  Grass Smut 0.2 ml of dilution # 0  Cat 0.2 ml of dilution # 1  Dog 0.2 ml of dilution # 1  Feather Mix 0.2 ml of dilution # 0  Dust Mite Mix 0.2 ml of dilution # 1  Horse 0.2 ml of dilution # 0    Dominik Bull RN on 2/10/2021 at 9:52 AM

## 2021-02-15 ENCOUNTER — ALLIED HEALTH/NURSE VISIT (OUTPATIENT)
Dept: ALLERGY | Facility: OTHER | Age: 41
End: 2021-02-15
Attending: PHYSICIAN ASSISTANT
Payer: COMMERCIAL

## 2021-02-15 DIAGNOSIS — J30.89 PERENNIAL ALLERGIC RHINITIS: Primary | ICD-10-CM

## 2021-02-15 PROCEDURE — 95117 IMMUNOTHERAPY INJECTIONS: CPT

## 2021-02-15 NOTE — PROGRESS NOTES
Prior to safety vial test, verified patient's identity using patient's name and date of birth.    Safety vial test was done in the left arm, for new Green vial # 1.   Administered  0.01ml (ID) for SVT.  SVT = 8 mm.   Passed.    Safety vial test was done in the right arm, for new Green vial # 2.   Administered  0.01ml (ID) for SVT.  SVT = 7 mm.   Passed.    Allergy injection/s given and charted on paper allergy flow sheet.  Patient signed out AMA and did not stay for observation.    Maude Huber RN

## 2021-02-22 ENCOUNTER — ALLIED HEALTH/NURSE VISIT (OUTPATIENT)
Dept: ALLERGY | Facility: OTHER | Age: 41
End: 2021-02-22
Attending: PHYSICIAN ASSISTANT
Payer: COMMERCIAL

## 2021-02-22 DIAGNOSIS — J30.89 PERENNIAL ALLERGIC RHINITIS: Primary | ICD-10-CM

## 2021-02-22 PROCEDURE — 95117 IMMUNOTHERAPY INJECTIONS: CPT

## 2021-03-01 ENCOUNTER — ALLIED HEALTH/NURSE VISIT (OUTPATIENT)
Dept: ALLERGY | Facility: OTHER | Age: 41
End: 2021-03-01
Attending: PHYSICIAN ASSISTANT
Payer: COMMERCIAL

## 2021-03-01 DIAGNOSIS — J30.89 PERENNIAL ALLERGIC RHINITIS: Primary | ICD-10-CM

## 2021-03-01 PROCEDURE — 95117 IMMUNOTHERAPY INJECTIONS: CPT

## 2021-03-08 ENCOUNTER — ALLIED HEALTH/NURSE VISIT (OUTPATIENT)
Dept: ALLERGY | Facility: OTHER | Age: 41
End: 2021-03-08
Attending: PHYSICIAN ASSISTANT
Payer: COMMERCIAL

## 2021-03-08 DIAGNOSIS — J30.89 PERENNIAL ALLERGIC RHINITIS: Primary | ICD-10-CM

## 2021-03-08 PROCEDURE — 95117 IMMUNOTHERAPY INJECTIONS: CPT

## 2021-03-15 ENCOUNTER — ALLIED HEALTH/NURSE VISIT (OUTPATIENT)
Dept: ALLERGY | Facility: OTHER | Age: 41
End: 2021-03-15
Attending: PHYSICIAN ASSISTANT
Payer: COMMERCIAL

## 2021-03-15 ENCOUNTER — TELEPHONE (OUTPATIENT)
Dept: ALLERGY | Facility: OTHER | Age: 41
End: 2021-03-15

## 2021-03-15 ENCOUNTER — TRANSFERRED RECORDS (OUTPATIENT)
Dept: ALLERGY | Facility: OTHER | Age: 41
End: 2021-03-15

## 2021-03-15 DIAGNOSIS — J30.89 PERENNIAL ALLERGIC RHINITIS: Primary | ICD-10-CM

## 2021-03-15 PROCEDURE — 95117 IMMUNOTHERAPY INJECTIONS: CPT

## 2021-03-15 NOTE — PROGRESS NOTES
Prior to injection verified pt identity using pt name and date of birth.    Allergy injection/s given and charted on paper allergy flow sheet.  Patient left AMA, signing form, not staying for the observation period.    Dominik Bull RN on 3/15/2021 at 1:09 PM

## 2021-03-15 NOTE — TELEPHONE ENCOUNTER
Patient requested to switch from allergy shots to allergy drops due to a new job.  Paperwork is completed today.  Discussed with Jazzmine Nava CNP, and it was decided to start patient at dilution #2 for all allergens, as patient has not completed her dilution #1 vial of allergy shots.  In addition, patient has had reactions in the past and redid the entire Blue vial of allergy shots.  Patient is scheduled for an appointment this Thursday for her allergy drop education and first dose.  She is aware she needs to bring her epi-pens with to the appointment.  Patient wanted the allergy drops shipped express so she can fit in the appointment before she starts her new job next week.  This is arranged through [facility], and the patient is aware of the additional cost for express shipping.    Maude Huber RN

## 2021-03-18 ENCOUNTER — OFFICE VISIT (OUTPATIENT)
Dept: ALLERGY | Facility: OTHER | Age: 41
End: 2021-03-18
Attending: PHYSICIAN ASSISTANT
Payer: COMMERCIAL

## 2021-03-18 DIAGNOSIS — J30.89 PERENNIAL ALLERGIC RHINITIS: Primary | ICD-10-CM

## 2021-03-18 NOTE — PROGRESS NOTES
Prior to allergy drop education, verified patient's identity using patient's name and date of birth.    Patient is here for education and  SLIT drops and first administration.  All instructions for SLIT use are reviewed with the patient.  Signs and symptoms of anaphylaxis both local and systemic are discussed.  Patient verbalized understanding.  Patient is taught how to use EPI pen and a return demonstration is given.  Patient has previously been taught about anaphylaxis and epi-pen use because she has been on allergy shots, but all information was reviewed.  First administration of SLIT is done by patient without incident.  Patient has EPI pen with today.  Patient is aware that she is due for a follow-up in August and may call us for refills of SLIT when it starts to run low.      Maude Huber RN

## 2021-04-18 ENCOUNTER — HEALTH MAINTENANCE LETTER (OUTPATIENT)
Age: 41
End: 2021-04-18

## 2021-05-25 ENCOUNTER — OFFICE VISIT (OUTPATIENT)
Dept: FAMILY MEDICINE | Facility: OTHER | Age: 41
End: 2021-05-25
Attending: FAMILY MEDICINE
Payer: COMMERCIAL

## 2021-05-25 VITALS
TEMPERATURE: 99.2 F | SYSTOLIC BLOOD PRESSURE: 128 MMHG | HEART RATE: 76 BPM | DIASTOLIC BLOOD PRESSURE: 80 MMHG | BODY MASS INDEX: 39.53 KG/M2 | OXYGEN SATURATION: 97 % | WEIGHT: 246 LBS | HEIGHT: 66 IN | RESPIRATION RATE: 18 BRPM

## 2021-05-25 DIAGNOSIS — R43.0 ANOSMIA: ICD-10-CM

## 2021-05-25 DIAGNOSIS — Z13.1 SCREENING FOR DIABETES MELLITUS: ICD-10-CM

## 2021-05-25 DIAGNOSIS — E66.01 MORBID OBESITY (H): ICD-10-CM

## 2021-05-25 DIAGNOSIS — M25.561 ARTHRALGIA OF BOTH KNEES: ICD-10-CM

## 2021-05-25 DIAGNOSIS — Z00.00 ROUTINE GENERAL MEDICAL EXAMINATION AT A HEALTH CARE FACILITY: Primary | ICD-10-CM

## 2021-05-25 DIAGNOSIS — Z13.220 LIPID SCREENING: ICD-10-CM

## 2021-05-25 DIAGNOSIS — M25.562 ARTHRALGIA OF BOTH KNEES: ICD-10-CM

## 2021-05-25 LAB
CHOLEST SERPL-MCNC: 230 MG/DL
GLUCOSE SERPL-MCNC: 91 MG/DL (ref 70–105)
HDLC SERPL-MCNC: 36 MG/DL (ref 23–92)
LDLC SERPL CALC-MCNC: 137 MG/DL
NONHDLC SERPL-MCNC: 194 MG/DL
TRIGL SERPL-MCNC: 287 MG/DL

## 2021-05-25 PROCEDURE — 86769 SARS-COV-2 COVID-19 ANTIBODY: CPT | Performed by: FAMILY MEDICINE

## 2021-05-25 PROCEDURE — 82947 ASSAY GLUCOSE BLOOD QUANT: CPT | Mod: ZL | Performed by: FAMILY MEDICINE

## 2021-05-25 PROCEDURE — 36415 COLL VENOUS BLD VENIPUNCTURE: CPT | Mod: ZL | Performed by: FAMILY MEDICINE

## 2021-05-25 PROCEDURE — 80061 LIPID PANEL: CPT | Mod: ZL | Performed by: FAMILY MEDICINE

## 2021-05-25 PROCEDURE — 99386 PREV VISIT NEW AGE 40-64: CPT | Performed by: FAMILY MEDICINE

## 2021-05-25 PROCEDURE — 86200 CCP ANTIBODY: CPT | Mod: ZL | Performed by: FAMILY MEDICINE

## 2021-05-25 ASSESSMENT — MIFFLIN-ST. JEOR: SCORE: 1788.63

## 2021-05-25 ASSESSMENT — PAIN SCALES - GENERAL: PAINLEVEL: NO PAIN (0)

## 2021-05-25 NOTE — NURSING NOTE
"Chief Complaint   Patient presents with     Physical     Patient presented to the clinic for her annual physical.     Initial /80 (BP Location: Right arm, Patient Position: Sitting, Cuff Size: Adult Large)   Pulse 76   Temp 99.2  F (37.3  C) (Tympanic)   Resp 18   Ht 1.67 m (5' 5.75\")   Wt 111.6 kg (246 lb)   SpO2 97%   Breastfeeding No   BMI 40.01 kg/m   Estimated body mass index is 40.01 kg/m  as calculated from the following:    Height as of this encounter: 1.67 m (5' 5.75\").    Weight as of this encounter: 111.6 kg (246 lb).         Medication Reconciliation: Complete      Guera Diallo LPN   "

## 2021-05-25 NOTE — PROGRESS NOTES
SUBJECTIVE:   CC: Gwendolyn Dietrich is an 40 year old woman who presents for preventive health visit.        Patient has been advised of split billing requirements and indicates understanding: Yes  Healthy Habits:    Do you get at least three servings of calcium containing foods daily (dairy, green leafy vegetables, etc.)? yes    Amount of exercise or daily activities, outside of work: 2 day(s) per week    Problems taking medications regularly No    Medication side effects: No    Have you had an eye exam in the past two years? yes    Do you see a dentist twice per year? yes    Do you have sleep apnea, excessive snoring or daytime drowsiness?no          Today's PHQ-2 Score:   PHQ-2 (  [employer]) 2021   Q1: Little interest or pleasure in doing things 0 0   Q2: Feeling down, depressed or hopeless 0 0   PHQ-2 Score 0 0       Abuse: Current or Past(Physical, Sexual or Emotional)- No  Do you feel safe in your environment? Yes        Social History     Tobacco Use     Smoking status: Former Smoker     Types: Cigarettes     Quit date: 2008     Years since quittin.0     Smokeless tobacco: Never Used   Substance Use Topics     Alcohol use: No     If you drink alcohol do you typically have >3 drinks per day or >7 drinks per week? No                     Reviewed orders with patient.  Reviewed health maintenance and updated orders accordingly - Yes      FSH-7: No flowsheet data found.    Mammogram Screening - Offered annual screening and updated Health Maintenance for mutual plan based on risk factor consideration, she wants to wait    Pertinent mammograms are reviewed under the imaging tab.    Pertinent mammograms are reviewed under the imaging tab.  History of abnormal Pap smear: NO - age 30-65 PAP every 5 years with negative HPV co-testing recommended  PAP / HPV 2013   PAP NIL     Reviewed and updated as needed this visit by clinical staff  Tobacco  Allergies  Meds              Reviewed and  "updated as needed this visit by Provider                    ROS: has been told to not get COVID vaccines by her allergist, history of anaphylaxis.  Would like Ab testing, was ill in December, negative COVID PCR then, lost her smell and feels it was a false negative. 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    Is due for a pap smear but she wants to delay.      CONSTITUTIONAL: NEGATIVE for fever, chills, change in weight  INTEGUMENTARU/SKIN: NEGATIVE for worrisome rashes, moles or lesions  EYES: NEGATIVE for vision changes or irritation  ENT: NEGATIVE for ear, mouth and throat problems  RESP: NEGATIVE for significant cough or SOB  BREAST: NEGATIVE for masses, tenderness or discharge  CV: NEGATIVE for chest pain, palpitations or peripheral edema  GI: NEGATIVE for nausea, abdominal pain, heartburn, or change in bowel habits  : NEGATIVE for unusual urinary or vaginal symptoms. Periods are regular.  MUSCULOSKELETAL: some knee pains, with cracking noises.  Wants to be tested for rheumatoid arthritis.  Significant FH of rheumatoid arthritis.  Pains are worst in the morning and improves with use.  No joint swelling.   NEURO: NEGATIVE for weakness, dizziness or paresthesias  PSYCHIATRIC: NEGATIVE for changes in mood or affect    OBJECTIVE:   /80 (BP Location: Right arm, Patient Position: Sitting, Cuff Size: Adult Large)   Pulse 76   Temp 99.2  F (37.3  C) (Tympanic)   Resp 18   Ht 1.67 m (5' 5.75\")   Wt 111.6 kg (246 lb)   SpO2 97%   Breastfeeding No   BMI 40.01 kg/m    EXAM:  GENERAL: healthy, alert and no distress  EYES: Eyes grossly normal to inspection, PERRL and conjunctivae and sclerae normal  HENT: ear canals and TM's normal, nose and mouth without ulcers or lesions  NECK: no adenopathy, no asymmetry, masses, or scars and thyroid normal to palpation  RESP: lungs clear to auscultation - no rales, rhonchi or wheezes  BREAST: normal without masses, tenderness or nipple discharge and no palpable axillary " "masses or adenopathy  CV: regular rate and rhythm, normal S1 S2, no S3 or S4, no murmur, click or rub, no peripheral edema and peripheral pulses strong  ABDOMEN: soft, nontender, no hepatosplenomegaly, no masses and bowel sounds normal  MS: no gross musculoskeletal defects noted, no edema  SKIN: no suspicious lesions or rashes  NEURO: Normal strength and tone, mentation intact and speech normal  PSYCH: mentation appears normal, affect normal/bright    Diagnostic Test Results:  Labs reviewed in Epic  Results for orders placed or performed in visit on 05/25/21   Glucose     Status: None   Result Value Ref Range    Glucose 91 70 - 105 mg/dL   Lipid Profile     Status: Abnormal   Result Value Ref Range    Cholesterol 230 (H) <200 mg/dL    Triglycerides 287 (H) <150 mg/dL    HDL Cholesterol 36 23 - 92 mg/dL    LDL Cholesterol Calculated 137 (H) <100 mg/dL    Non HDL Cholesterol 194 (H) <130 mg/dL         ASSESSMENT/PLAN:       ICD-10-CM    1. Routine general medical examination at a health care facility  Z00.00    2. Lipid screening  Z13.220 Lipid Profile     Lipid Profile   3. Morbid obesity (H)  E66.01    4. Anosmia  R43.0 COVID-19 Ricco RBD Brianna & Titer Reflex     COVID-19 Ricco RBD Brianna & Titer Reflex   5. Arthralgia of both knees  M25.561 Cyclic Citrullinated Peptide Antibody IgG    M25.562 Cyclic Citrullinated Peptide Antibody IgG   6. Screening for diabetes mellitus  Z13.1 Glucose     Glucose       Patient has been advised of split billing requirements and indicates understanding: Yes  COUNSELING:   Reviewed preventive health counseling, as reflected in patient instructions       Regular exercise       Healthy diet/nutrition       Colon cancer screening    Estimated body mass index is 40.01 kg/m  as calculated from the following:    Height as of this encounter: 1.67 m (5' 5.75\").    Weight as of this encounter: 111.6 kg (246 lb).    Weight management plan: Discussed healthy diet and exercise guidelines    She " reports that she quit smoking about 13 years ago. Her smoking use included cigarettes. She has never used smokeless tobacco.      Counseling Resources:  ATP IV Guidelines  Pooled Cohorts Equation Calculator  Breast Cancer Risk Calculator  BRCA-Related Cancer Risk Assessment: FHS-7 Tool  FRAX Risk Assessment  ICSI Preventive Guidelines  Dietary Guidelines for Americans, 2010  USDA's MyPlate  ASA Prophylaxis  Lung CA Screening    Alfonso Alvarez MD  [facility] AND [facility]

## 2021-05-27 LAB
CCP AB SER IA-ACNC: <1 U/ML
SARS-COV-2 AB PNL SERPL IA: POSITIVE
SARS-COV-2 IGG SERPL IA-ACNC: NORMAL

## 2021-06-03 ENCOUNTER — TELEPHONE (OUTPATIENT)
Dept: OTOLARYNGOLOGY | Facility: OTHER | Age: 41
End: 2021-06-03

## 2021-06-14 ENCOUNTER — TELEPHONE (OUTPATIENT)
Dept: ALLERGY | Facility: OTHER | Age: 41
End: 2021-06-14

## 2021-06-14 NOTE — TELEPHONE ENCOUNTER
Spoke with patient regarding refill on allergy drops.  Patient is not having issues on the drops, and her epi-pens are current. Patient can follow-up anytime between now and August 2021.  Will process refill request.    Maude Huber RN

## 2021-07-19 ENCOUNTER — OFFICE VISIT (OUTPATIENT)
Dept: INTERNAL MEDICINE | Facility: OTHER | Age: 41
End: 2021-07-19
Attending: INTERNAL MEDICINE
Payer: COMMERCIAL

## 2021-07-19 VITALS
WEIGHT: 249 LBS | SYSTOLIC BLOOD PRESSURE: 132 MMHG | BODY MASS INDEX: 40.5 KG/M2 | OXYGEN SATURATION: 99 % | RESPIRATION RATE: 18 BRPM | HEART RATE: 88 BPM | TEMPERATURE: 99.1 F | DIASTOLIC BLOOD PRESSURE: 82 MMHG

## 2021-07-19 DIAGNOSIS — G60.9 HEREDITARY AND IDIOPATHIC PERIPHERAL NEUROPATHY: ICD-10-CM

## 2021-07-19 DIAGNOSIS — M25.50 MULTIPLE JOINT PAIN: Primary | ICD-10-CM

## 2021-07-19 DIAGNOSIS — M54.50 CHRONIC BILATERAL LOW BACK PAIN WITHOUT SCIATICA: ICD-10-CM

## 2021-07-19 DIAGNOSIS — E66.01 MORBID OBESITY (H): ICD-10-CM

## 2021-07-19 DIAGNOSIS — G89.29 CHRONIC BILATERAL LOW BACK PAIN WITHOUT SCIATICA: ICD-10-CM

## 2021-07-19 LAB
ALBUMIN SERPL-MCNC: 4.8 G/DL (ref 3.5–5.7)
ALP SERPL-CCNC: 50 U/L (ref 34–104)
ALT SERPL W P-5'-P-CCNC: 22 U/L (ref 7–52)
ANION GAP SERPL CALCULATED.3IONS-SCNC: 8 MMOL/L (ref 3–14)
AST SERPL W P-5'-P-CCNC: 19 U/L (ref 13–39)
BASOPHILS # BLD AUTO: 0.1 10E3/UL (ref 0–0.2)
BASOPHILS NFR BLD AUTO: 1 %
BILIRUB SERPL-MCNC: 0.6 MG/DL (ref 0.3–1)
BUN SERPL-MCNC: 10 MG/DL (ref 7–25)
CALCIUM SERPL-MCNC: 9.7 MG/DL (ref 8.6–10.3)
CHLORIDE BLD-SCNC: 102 MMOL/L (ref 98–107)
CO2 SERPL-SCNC: 25 MMOL/L (ref 21–31)
CREAT SERPL-MCNC: 0.9 MG/DL (ref 0.6–1.2)
EOSINOPHIL # BLD AUTO: 0.1 10E3/UL (ref 0–0.7)
EOSINOPHIL NFR BLD AUTO: 1 %
ERYTHROCYTE [DISTWIDTH] IN BLOOD BY AUTOMATED COUNT: 12.8 % (ref 10–15)
GFR SERPL CREATININE-BSD FRML MDRD: 79 ML/MIN/1.73M2
GLUCOSE BLD-MCNC: 81 MG/DL (ref 70–105)
HCT VFR BLD AUTO: 39.3 % (ref 35–47)
HGB BLD-MCNC: 13.4 G/DL (ref 11.7–15.7)
IMM GRANULOCYTES # BLD: 0 10E3/UL
IMM GRANULOCYTES NFR BLD: 0 %
LYMPHOCYTES # BLD AUTO: 1.9 10E3/UL (ref 0.8–5.3)
LYMPHOCYTES NFR BLD AUTO: 22 %
MCH RBC QN AUTO: 29.2 PG (ref 26.5–33)
MCHC RBC AUTO-ENTMCNC: 34.1 G/DL (ref 31.5–36.5)
MCV RBC AUTO: 86 FL (ref 78–100)
MONOCYTES # BLD AUTO: 0.7 10E3/UL (ref 0–1.3)
MONOCYTES NFR BLD AUTO: 8 %
NEUTROPHILS # BLD AUTO: 5.8 10E3/UL (ref 1.6–8.3)
NEUTROPHILS NFR BLD AUTO: 68 %
NRBC # BLD AUTO: 0 10E3/UL
NRBC BLD AUTO-RTO: 0 /100
PLATELET # BLD AUTO: 249 10E3/UL (ref 150–450)
POTASSIUM BLD-SCNC: 3.6 MMOL/L (ref 3.5–5.1)
PROT SERPL-MCNC: 7.5 G/DL (ref 6.4–8.9)
RBC # BLD AUTO: 4.59 10E6/UL (ref 3.8–5.2)
SODIUM SERPL-SCNC: 135 MMOL/L (ref 134–144)
TSH SERPL DL<=0.005 MIU/L-ACNC: 2.2 MU/L (ref 0.4–4)
VIT B12 SERPL-MCNC: 399 PG/ML (ref 180–914)
WBC # BLD AUTO: 8.6 10E3/UL (ref 4–11)

## 2021-07-19 PROCEDURE — 86618 LYME DISEASE ANTIBODY: CPT | Mod: ZL | Performed by: INTERNAL MEDICINE

## 2021-07-19 PROCEDURE — 82040 ASSAY OF SERUM ALBUMIN: CPT | Mod: ZL | Performed by: INTERNAL MEDICINE

## 2021-07-19 PROCEDURE — 82607 VITAMIN B-12: CPT | Mod: ZL | Performed by: INTERNAL MEDICINE

## 2021-07-19 PROCEDURE — 84443 ASSAY THYROID STIM HORMONE: CPT | Mod: ZL | Performed by: INTERNAL MEDICINE

## 2021-07-19 PROCEDURE — 85004 AUTOMATED DIFF WBC COUNT: CPT | Mod: ZL | Performed by: INTERNAL MEDICINE

## 2021-07-19 PROCEDURE — 86038 ANTINUCLEAR ANTIBODIES: CPT | Mod: ZL | Performed by: INTERNAL MEDICINE

## 2021-07-19 PROCEDURE — 99214 OFFICE O/P EST MOD 30 MIN: CPT | Performed by: INTERNAL MEDICINE

## 2021-07-19 PROCEDURE — 36415 COLL VENOUS BLD VENIPUNCTURE: CPT | Mod: ZL | Performed by: INTERNAL MEDICINE

## 2021-07-19 ASSESSMENT — PAIN SCALES - GENERAL: PAINLEVEL: MODERATE PAIN (5)

## 2021-07-19 NOTE — PROGRESS NOTES
Chief Complaint   Patient presents with     Consult     auto-immune questions         HPI: Ms. Dietrich is a 40 year old female who presents today for concerns for autoimmune questions.    Ms. Dietrich presents with 15 years of joint pain with popping and snapping sounds made by the joints. She also has continued joint pain.  It is in multiple joints, knees, ankles, back and fingers (MCP and PIP joints).  Three years ago, the pain in the joints became more severe and she has had trouble with pain and movement in the mornings, but notes that the pain lessens throughout the day with movement. In the mornings her pain is at a level 7 out of 10, and by the end of the day it is at a 5 on the pain scale. She has also notices loss of pigmentation on the skin of the hands beginning 2 years ago and on the arms beginning three weeks ago. The quality of the joint pain is dull and persistent, and most present in the knees, ankles, back, and MCP and PIP joints of fingers. She has tried treating with ibuprofen with no change, and sees a chiropractor once a month with no notable change in pain. She notes that severe temperatures make the joint pain worse. She has swelling in her fingers that increases throughout the day. She has occasionally experiences hives. She denies any muscle pain.     History is discussed and updated on 7/19/2021 with patient.  It is current to the best of my knowledge as below.    Past Medical History:   Diagnosis Date     Obesity 5/8/2013        Past Surgical History:   Procedure Laterality Date     BACK SURGERY  01/01/2005     BIOPSY OF SKIN LESION       CHOLECYSTECTOMY  01/01/2011     HYSTERECTOMY  01/01/2012    menorrhagia, precancerous cells     ORTHOPEDIC SURGERY  01/01/2005    back surgery     TONSILLECTOMY       TUBAL LIGATION  01/01/2007         Current Outpatient Medications   Medication Sig Dispense Refill     fexofenadine (ALLEGRA) 180 MG tablet Take 1 tablet (180 mg) by mouth daily 90 tablet 3      multivitamin w/minerals (THERA-VIT-M) tablet Take 1 tablet by mouth daily       SUBLINGUAL IMMUNOTHERAPY, SLIT, Patient to switch from allergy shots to allergy drops.  Mix allergy drops at #2 dilution for all allergens.  Patient to do a slow build:  1 drop daily x 7 days, then 1 drop twice daily x 7 days, then 1 drop three times daily ongoing. 1 vial PRN     EPINEPHrine (ANY BX GENERIC EQUIV) 0.3 MG/0.3ML injection 2-pack Inject 0.3 mLs (0.3 mg) into the muscle as needed for anaphylaxis 2 each 1       Allergies   Allergen Reactions     Seasonal Allergies Anaphylaxis     Birch, Dog and Cats - has Epi-pen for this reason     Codeine GI Disturbance        Family History   Problem Relation Age of Onset     Hearing Loss Father      Lipids Father      Hypertension Father      Diabetes Father      Lipids Mother      Hypertension Mother      Irritable Bowel Syndrome Mother      Obesity Mother      Rheumatoid Arthritis Mother      Diabetes Mother      No Known Problems Sister      Cancer Maternal Grandmother      Osteoporosis Maternal Grandmother      Cancer Paternal Grandmother        Family Status   Relation Name Status     Fa matthew Alive     Mo sharif Alive     Sis sri Alive     MGMo  (Not Specified)     PGMo  (Not Specified)        Social History     Tobacco Use     Smoking status: Former Smoker     Types: Cigarettes     Quit date: 2008     Years since quittin.2     Smokeless tobacco: Never Used   Substance Use Topics     Alcohol use: Yes     Comment: occasionally - a couple times a month       Social History     Social History Narrative     Not on file             ROS  GEN:-fevers/-chills/+night sweats occasionally /-wt change  NEURO: +headaches ongoing/-vision changes  EARS: -hearing changes/-tinnitus  NOSE: +drainage/+congestion  MOUTH/THROAT: - sore throat/-dysphagia/-sores  LUNGS: -sob other than allergies/-cough  CARDIOVASCULAR: -cp/-palpitations  GI: -pain/+change in bowels with IBS/-bloody  "stools  : -change in bladder/+vaginal spotting occasional  ENDOCRINE: -skin or hair changes  HEMATOLOGIC/LYMPHATIC: -swollen nodes  SKIN: -rashes/-lesions  MSK/RHEUM: +joint pain/-swelling  NEURO: -weakness/+parasthesias  PSYCH:-depression/-anxiety     EXAM:   /82 (BP Location: Right arm, Patient Position: Sitting, Cuff Size: Adult Large)   Pulse 88   Temp 99.1  F (37.3  C) (Tympanic)   Resp 18   Wt 112.9 kg (249 lb)   SpO2 99%   BMI 40.50 kg/m    Estimated body mass index is 40.5 kg/m  as calculated from the following:    Height as of 5/25/21: 1.67 m (5' 5.75\").    Weight as of this encounter: 112.9 kg (249 lb).      GEN: Vitals reviewed. Healthy appearing. Patient is in no acute distress. Cooperative with exam.  HEENT: Normocephalic atraumatic.  Eyes grossly normal to inspection.  No discharge or erythema, or obvious scleral/conjunctival abnormalities.   NECK: Supple; no thyromegaly or masses noted.  No cervical or supraclavicular lymphadenopathy.  CV: Heart regular in rate and rhythm with no murmur.    LUNGS: No audible wheeze, cough, or visible cyanosis.  No visible retractions or increased work of breathing.  Lungs clear to auscultation bilaterally.    ABD:  Obese, Nondistended  SKIN: Warm and dry to touch.  Visible skin clear. No significant rash, abnormal pigmentation or lesions.  EXT: No clubbing or cyanosis.  No peripheral edema.  No obvious synovitis/erythema or joint swelling of the hands.  NEURO: Alert and oriented to person, place, and time.  Cranial nerves II-XII grossly intact with no focal or lateralizing deficits.  Muscle tone normal.  Gait normal. No tremor.   MSK: ROM of upper and lower ext symmetric and full.  PSYCH: Mood is good.  Mentation appears normal, affect normal/bright, judgement and insight intact, normal speech and appearance well-groomed.       ASSESSMENT AND PLAN:    Multiple joint pain  - extensive testing is all negative.  Suspect Osteoarthritis which we discussed " today.  Additional information provided.  Call with new or changing symptoms.  - TSH Reflex GH  - CBC and Differential  - Anti Nuclear Brianna IgG by IFA with Reflex  - Comprehensive Metabolic Panel  - Lyme Disease Ab with reflex to WB Serum    Chronic bilateral low back pain without sciatica  - obtain SI imaging and may benefit from PT/injection.    - XR Sacroiliac Joint G/E 3 Views    Morbid obesity (H)  - encouraged to work on diet/weight loss to help joints    Hereditary and idiopathic peripheral neuropathy  - B12 levels checked and essentially normal.  - Vitamin B12        Return if symptoms worsen or fail to improve.     33 minutes spent on the date of the encounter doing chart review, history and exam, documentation and further activities as noted above        ALLISON DEGROOT, DO   7/19/2021 4:07 PM

## 2021-07-19 NOTE — NURSING NOTE
Patient presents to clinic today for consult regarding auto-immune / family history.    Medication reconciliation completed.    FOOD SECURITY SCREENING QUESTIONS  Hunger Vital Signs:  Within the past 12 months we worried whether our food would run out before we got money to buy more. Never  Within the past 12 months the food we bought just didn't last and we didn't have money to get more. Never    Avani Diana CMA(AAMA)..................7/19/2021   3:37 PM

## 2021-07-19 NOTE — PATIENT INSTRUCTIONS
Patient Education     How to Help Prevent Osteoarthritis  Arthritis is a condition that causes pain and inflammation in joints. There are about 100 different types of arthritis. A joint is a place in the body where 2 bones meet. Arthritis may also affect other body tissue near the joints. This includes muscles, tendons, and ligaments. With some forms of arthritis, the whole body may have symptoms.     Osteoarthritis (OA) is the most common type of arthritis. It's sometimes called degenerative joint disease or wear-and-tear arthritis. In OA, the cartilage in your joints wears away. Cartilage covers the ends of bones and acts as a cushion. If too much cartilage wears away, bone rubs against bone. The joint changes in OA cause pain, stiffness, and trouble with movement.   Can you prevent OA?  OA is a result of using your joints every day. The older a person gets, the more wear-and-tear happens. You can t fully prevent OA. But you can help lessen daily stress on your joints. This can make it less likely that OA will happen, or get worse. Taking good care of yourself can help prevent joint problems.    Keep a healthy body weight  Extra weight puts stress on your joints. It can most hurt your hips, knees, ankles and feet. And extra fat causes changes in the cartilage. If you are overweight, talk with your healthcare provider about safe ways to lose weight.   Control your blood sugar  High blood sugar levels raise your risk of getting OA. If you have diabetes, get your blood sugar levels checked regularly. Talk with your healthcare provider about ways to manage your levels if they are too high.   Be active every day  Exercise is a good way to prevent joint problems. It helps to keep joints from getting stiff. It keeps muscles strong. It's also an important part of treating arthritis. Try to get at least 30 minutes of exercise on most days. Talk with your healthcare provider about safe exercise for you.   You can also try  the Arthritis Foundation's Walk With Ease program.    Prevent injury to your joints   Joint injuries increase your risk of getting OA. When you exercise, start slowly and work up to your goal. Each time you exercise, take 5 to 10 minutes to warm up with gentle movements and stretches. This helps to prevent injuries to muscles, joints, ligaments, and tendons.   Think about changing your exercises and activities each day. You will use different parts of your body. This will help prevent stress to the same joints every day.   Be careful with your daily activities. Some activities put extra stress on joints. For example, carry heavy bags of groceries close to your body in the crook of your am instead of holding them with your hands.   Use exercise equipment and protective gear as instructed. Make sure your safety gear is comfortable and fits well.   Pay attention to pain  If you have joint pain that lasts 1 to 2 hours after activity or exercise, you may have done too much. Rest the joint. Use an ice pack to relieve pain.   Consider getting an assessment by a physical therapist to learn the best exercises to protect your joints.   Talk with your healthcare provider about using ice packs and pain medicine before and after you exercise.     4104-3721 [employer]. All rights reserved. This information is not intended as a substitute for professional medical care. Always follow your healthcare professional's instructions.

## 2021-07-21 LAB
ANA SER QL IF: NEGATIVE
B BURGDOR IGG+IGM SER QL: 0.04

## 2021-07-24 ENCOUNTER — MYC MEDICAL ADVICE (OUTPATIENT)
Dept: FAMILY MEDICINE | Facility: OTHER | Age: 41
End: 2021-07-24

## 2021-07-27 ENCOUNTER — HOSPITAL ENCOUNTER (OUTPATIENT)
Dept: GENERAL RADIOLOGY | Facility: OTHER | Age: 41
Discharge: HOME | End: 2021-07-27
Attending: INTERNAL MEDICINE | Admitting: INTERNAL MEDICINE
Payer: COMMERCIAL

## 2021-07-27 DIAGNOSIS — G89.29 CHRONIC BILATERAL LOW BACK PAIN WITHOUT SCIATICA: ICD-10-CM

## 2021-07-27 DIAGNOSIS — M54.50 CHRONIC BILATERAL LOW BACK PAIN WITHOUT SCIATICA: ICD-10-CM

## 2021-07-27 PROCEDURE — 72202 X-RAY EXAM SI JOINTS 3/> VWS: CPT

## 2021-07-30 ENCOUNTER — IMMUNIZATION (OUTPATIENT)
Dept: FAMILY MEDICINE | Facility: OTHER | Age: 41
End: 2021-07-30
Payer: COMMERCIAL

## 2021-07-30 PROCEDURE — 91300 PR COVID VAC PFIZER DIL RECON 30 MCG/0.3 ML IM: CPT

## 2021-08-20 ENCOUNTER — IMMUNIZATION (OUTPATIENT)
Dept: FAMILY MEDICINE | Facility: OTHER | Age: 41
End: 2021-08-20
Attending: FAMILY MEDICINE
Payer: COMMERCIAL

## 2021-08-20 PROCEDURE — 91300 PR COVID VAC PFIZER DIL RECON 30 MCG/0.3 ML IM: CPT

## 2021-09-13 ENCOUNTER — TELEPHONE (OUTPATIENT)
Dept: ALLERGY | Facility: OTHER | Age: 41
End: 2021-09-13

## 2021-09-13 DIAGNOSIS — T78.40XA ALLERGIC REACTION: ICD-10-CM

## 2021-09-13 RX ORDER — EPINEPHRINE 0.3 MG/.3ML
0.3 INJECTION SUBCUTANEOUS ONCE
Qty: 2 EACH | Refills: 1 | Status: SHIPPED | OUTPATIENT
Start: 2021-09-13 | End: 2021-11-27

## 2021-09-13 NOTE — TELEPHONE ENCOUNTER
Patient called requesting a refill of her allergy drops.  She said she is doing well on the drops and denies any adverse reactions.  She said her Epi Pen is current, won't   until Nov, but patient requests we send in a refill today to [facility].  She is due for a SLIT f/u, will have scheduling call to arrange this appt.  We will process this request.    Dominik Bull RN on 2021 at 4:00 PM

## 2021-09-15 ENCOUNTER — TRANSFERRED RECORDS (OUTPATIENT)
Dept: HEALTH INFORMATION MANAGEMENT | Facility: HOSPITAL | Age: 41
End: 2021-09-15

## 2021-09-30 ENCOUNTER — OFFICE VISIT (OUTPATIENT)
Dept: OTOLARYNGOLOGY | Facility: OTHER | Age: 41
End: 2021-09-30
Attending: PHYSICIAN ASSISTANT
Payer: COMMERCIAL

## 2021-09-30 VITALS
OXYGEN SATURATION: 99 % | WEIGHT: 245 LBS | DIASTOLIC BLOOD PRESSURE: 70 MMHG | TEMPERATURE: 97.3 F | BODY MASS INDEX: 39.37 KG/M2 | RESPIRATION RATE: 18 BRPM | SYSTOLIC BLOOD PRESSURE: 120 MMHG | HEART RATE: 79 BPM | HEIGHT: 66 IN

## 2021-09-30 DIAGNOSIS — H91.93 DECREASED HEARING OF BOTH EARS: Primary | ICD-10-CM

## 2021-09-30 DIAGNOSIS — J30.89 PERENNIAL ALLERGIC RHINITIS: Primary | ICD-10-CM

## 2021-09-30 DIAGNOSIS — H93.8X3 EAR FULLNESS, BILATERAL: ICD-10-CM

## 2021-09-30 DIAGNOSIS — M26.609 TMJ (TEMPOROMANDIBULAR JOINT SYNDROME): ICD-10-CM

## 2021-09-30 PROCEDURE — 99213 OFFICE O/P EST LOW 20 MIN: CPT | Performed by: PHYSICIAN ASSISTANT

## 2021-09-30 ASSESSMENT — PAIN SCALES - GENERAL: PAINLEVEL: NO PAIN (1)

## 2021-09-30 ASSESSMENT — MIFFLIN-ST. JEOR: SCORE: 1775.12

## 2021-09-30 NOTE — PROGRESS NOTES
"Chief Complaint   Patient presents with     Allergies     Pt is here for a f/u slit.        Patient presents for f/u of SCIT.   She was last seen in ENT on 8/20/20 for SCIT follow up.  However, due to COVID was halted for about 12 weeks. She had been on SLIT without concerns.   She has felt decreased hearing and sore throat.   Sore throat for a bout few months. Describes a tightness while swallowing.   No concerns with choking or solid dysphagia.   No worrisome nasal congestion, post nasal drainage.   Denies n/f/v.   No worrisome reflux  Adequate to fair water.   Denies facial pain or pressure. No recurrent sinusitis.      Allergies fairly well controlled.         MQT  Dilution #6-Birch, Cat, Dust  Dilution #5- Ragweed, thistle, grass, maple, molds, dog  Dilution #2- pigweed, cottonwood, walnut, molds.      SLIT- Maintenance dose - 6/15/21.          Past Medical History:   Diagnosis Date     Obesity 5/8/2013        Allergies   Allergen Reactions     Seasonal Allergies Anaphylaxis     Birch, Dog and Cats - has Epi-pen for this reason     Codeine GI Disturbance     Current Outpatient Medications   Medication     EPINEPHrine (ANY BX GENERIC EQUIV) 0.3 MG/0.3ML injection 2-pack     fexofenadine (ALLEGRA) 180 MG tablet     multivitamin w/minerals (THERA-VIT-M) tablet     SUBLINGUAL IMMUNOTHERAPY, SLIT,     No current facility-administered medications for this visit.     ROS- SEE HPI  /70 (BP Location: Right arm, Patient Position: Sitting, Cuff Size: Adult Regular)   Pulse 79   Temp 97.3  F (36.3  C) (Tympanic)   Resp 18   Ht 1.664 m (5' 5.5\")   Wt 111.1 kg (245 lb)   SpO2 99%   BMI 40.15 kg/m      General - The patient is well nourished and well developed, and appears to have good nutritional status.  Alert and oriented to person and place, answers questions and cooperates with examination appropriately.   Head and Face - Normocephalic and atraumatic, with no gross asymmetry noted.  The facial nerve is " intact, with strong symmetric movements.  Voice and Breathing - The patient was breathing comfortably without the use of accessory muscles. There was no wheezing, stridor, or stertor.  The patients voice was clear and strong, and had appropriate pitch and quality.  Ears -The external auditory canals are patent, the tympanic membranes are intact without effusion, retraction or mass.  Bony landmarks are intact.  Eyes - Extraocular movements intact, and the pupils were reactive to light.  Sclera were not icteric or injected, conjunctiva were pink and moist.  Mouth - Examination of the oral cavity showed pink, healthy oral mucosa. No lesions or ulcerations noted.  The tongue was mobile and midline, and the dentition were in good condition.    Throat - The walls of the oropharynx were smooth, pink, moist, symmetric, and had no lesions or ulcerations.  The tonsillar pillars and soft palate were symmetric.  The uvula was midline on elevation.    Neck - Normal midline excursion of the laryngotracheal complex during swallowing.  Full range of motion on passive movement.  Palpation of the occipital, submental, submandibular, internal jugular chain, and supraclavicular nodes did not demonstrate any abnormal lymph nodes or masses.  Palpation of the thyroid was soft and smooth, with no nodules or goiter appreciated.  The trachea was mobile and midline.  Nose - External contour is symmetric, no gross deflection or scars.  Nasal mucosa is pink and moist with no abnormal mucus.  The septum was intact and the turbinates are enlarged.  No polyps, masses, or purulence noted on examination.   TMJ- Oral tenderness bilaterally. R>L. Palpable click bilaterally.       ASSESSMENT:    ICD-10-CM    1. Perennial allergic rhinitis  J30.89    2. Ear fullness, bilateral  H93.8X3    3. TMJ (temporomandibular joint syndrome)  M26.609      Follow up in 12 months for SLIT  Maintain three drops daily    Follow TMJ precautions.  Reassured normal ear  exam.  No fluid or infection on exam. TMJ is likely contributing to her ear fullness.   Consider physical therapy and/ or topical gel. Contact nurse if there is no improvement  Complete audiogram to ensure no asymmetrical HL.     Increase water intake.   If throat remains bothersome, return to ENT for scope.        Ptai Bradley PA-C  ENT  [facility], [place]

## 2021-09-30 NOTE — PATIENT INSTRUCTIONS
Continue with allergy drops.   Maintain allegra daily.     Follow TMJ precautions.  Ears look well. No fluid or infection on exam.   Consider physical therapy and/ or topical gel. Contact nurse if there is no improvement  Complete audiogram    Increase water intake.   If throat remains bothersome, return to ENT for scope.       Thank you for allowing Pati Bradley PA-C and our ENT team to participate in your care.  If your medications are too expensive, please give the nurse a call.  We can possibly change this medication.  If you have a scheduling or an appointment question please contact our Health Unit Coordinator at [phone], Ext. [phone].    ALL nursing questions or concerns can be directed to your ENT nurse at: [phone] [place]

## 2021-09-30 NOTE — NURSING NOTE
"Chief Complaint   Patient presents with     Allergies     Pt is here for a f/u slit.       Initial /70 (BP Location: Right arm, Patient Position: Sitting, Cuff Size: Adult Regular)   Pulse 79   Temp 97.3  F (36.3  C) (Tympanic)   Resp 18   Ht 1.664 m (5' 5.5\")   Wt 111.1 kg (245 lb)   SpO2 99%   BMI 40.15 kg/m   Estimated body mass index is 40.15 kg/m  as calculated from the following:    Height as of this encounter: 1.664 m (5' 5.5\").    Weight as of this encounter: 111.1 kg (245 lb).  Medication Reconciliation: complete  Leah Hanson LPN    "

## 2021-09-30 NOTE — LETTER
"    9/30/2021         RE: Gwendolyn Dietrich  [id number] [address] [address]        Dear Colleague,    Thank you for referring your patient, Gwendolyn Dietrich, to the [facility]. Please see a copy of my visit note below.    Chief Complaint   Patient presents with     Allergies     Pt is here for a f/u slit.        Patient presents for f/u of SCIT.   She was last seen in ENT on 8/20/20 for SCIT follow up.  However, due to COVID was halted for about 12 weeks. She had been on SLIT without concerns.   She has felt decreased hearing and sore throat.   Sore throat for a bout few months. Describes a tightness while swallowing.   No concerns with choking or solid dysphagia.   No worrisome nasal congestion, post nasal drainage.   Denies n/f/v.   No worrisome reflux  Adequate to fair water.   Denies facial pain or pressure. No recurrent sinusitis.      Allergies fairly well controlled.         MQT  Dilution #6-Birch, Cat, Dust  Dilution #5- Ragweed, thistle, grass, maple, molds, dog  Dilution #2- pigweed, cottonwood, walnut, molds.      SLIT- Maintenance dose - 6/15/21.          Past Medical History:   Diagnosis Date     Obesity 5/8/2013        Allergies   Allergen Reactions     Seasonal Allergies Anaphylaxis     Birch, Dog and Cats - has Epi-pen for this reason     Codeine GI Disturbance     Current Outpatient Medications   Medication     EPINEPHrine (ANY BX GENERIC EQUIV) 0.3 MG/0.3ML injection 2-pack     fexofenadine (ALLEGRA) 180 MG tablet     multivitamin w/minerals (THERA-VIT-M) tablet     SUBLINGUAL IMMUNOTHERAPY, SLIT,     No current facility-administered medications for this visit.     ROS- SEE HPI  /70 (BP Location: Right arm, Patient Position: Sitting, Cuff Size: Adult Regular)   Pulse 79   Temp 97.3  F (36.3  C) (Tympanic)   Resp 18   Ht 1.664 m (5' 5.5\")   Wt 111.1 kg (245 lb)   SpO2 99%   BMI 40.15 kg/m      General - The patient is well nourished and well developed, and " appears to have good nutritional status.  Alert and oriented to person and place, answers questions and cooperates with examination appropriately.   Head and Face - Normocephalic and atraumatic, with no gross asymmetry noted.  The facial nerve is intact, with strong symmetric movements.  Voice and Breathing - The patient was breathing comfortably without the use of accessory muscles. There was no wheezing, stridor, or stertor.  The patients voice was clear and strong, and had appropriate pitch and quality.  Ears -The external auditory canals are patent, the tympanic membranes are intact without effusion, retraction or mass.  Bony landmarks are intact.  Eyes - Extraocular movements intact, and the pupils were reactive to light.  Sclera were not icteric or injected, conjunctiva were pink and moist.  Mouth - Examination of the oral cavity showed pink, healthy oral mucosa. No lesions or ulcerations noted.  The tongue was mobile and midline, and the dentition were in good condition.    Throat - The walls of the oropharynx were smooth, pink, moist, symmetric, and had no lesions or ulcerations.  The tonsillar pillars and soft palate were symmetric.  The uvula was midline on elevation.    Neck - Normal midline excursion of the laryngotracheal complex during swallowing.  Full range of motion on passive movement.  Palpation of the occipital, submental, submandibular, internal jugular chain, and supraclavicular nodes did not demonstrate any abnormal lymph nodes or masses.  Palpation of the thyroid was soft and smooth, with no nodules or goiter appreciated.  The trachea was mobile and midline.  Nose - External contour is symmetric, no gross deflection or scars.  Nasal mucosa is pink and moist with no abnormal mucus.  The septum was intact and the turbinates are enlarged.  No polyps, masses, or purulence noted on examination.   TMJ- Oral tenderness bilaterally. R>L. Palpable click bilaterally.       ASSESSMENT:    ICD-10-CM     1. Perennial allergic rhinitis  J30.89    2. Ear fullness, bilateral  H93.8X3    3. TMJ (temporomandibular joint syndrome)  M26.609      Follow up in 12 months for SLIT  Maintain three drops daily    Follow TMJ precautions.  Reassured normal ear exam.  No fluid or infection on exam. TMJ is likely contributing to her ear fullness.   Consider physical therapy and/ or topical gel. Contact nurse if there is no improvement  Complete audiogram to ensure no asymmetrical HL.     Increase water intake.   If throat remains bothersome, return to ENT for scope.        Pati Bradley PA-C  ENT  [facility], [place]          Again, thank you for allowing me to participate in the care of your patient.        Sincerely,        Pati Bradley PA-C

## 2021-10-01 ENCOUNTER — OFFICE VISIT (OUTPATIENT)
Dept: AUDIOLOGY | Facility: OTHER | Age: 41
End: 2021-10-01
Attending: AUDIOLOGIST
Payer: COMMERCIAL

## 2021-10-01 DIAGNOSIS — Z01.10 EXAMINATION OF EARS AND HEARING: Primary | ICD-10-CM

## 2021-10-01 DIAGNOSIS — H91.93 DECREASED HEARING OF BOTH EARS: ICD-10-CM

## 2021-10-01 PROCEDURE — 92557 COMPREHENSIVE HEARING TEST: CPT | Performed by: AUDIOLOGIST

## 2021-10-01 PROCEDURE — 92550 TYMPANOMETRY & REFLEX THRESH: CPT | Performed by: AUDIOLOGIST

## 2021-10-01 NOTE — PROGRESS NOTES
Audiology Evaluation Completed. Please refer SCANNED AUDIOGRAM and/or TYMPANOGRAM for BACKGROUND, RESULTS, RECOMMENDATIONS.      Dia ZAFAR, [facility]-A  Audiologist #[id number]

## 2021-10-03 ENCOUNTER — HEALTH MAINTENANCE LETTER (OUTPATIENT)
Age: 41
End: 2021-10-03

## 2021-10-07 ENCOUNTER — OFFICE VISIT (OUTPATIENT)
Dept: FAMILY MEDICINE | Facility: OTHER | Age: 41
End: 2021-10-07
Attending: FAMILY MEDICINE
Payer: COMMERCIAL

## 2021-10-07 VITALS
DIASTOLIC BLOOD PRESSURE: 82 MMHG | TEMPERATURE: 96.5 F | HEART RATE: 76 BPM | WEIGHT: 255.6 LBS | HEIGHT: 66 IN | OXYGEN SATURATION: 99 % | RESPIRATION RATE: 18 BRPM | SYSTOLIC BLOOD PRESSURE: 122 MMHG | BODY MASS INDEX: 41.08 KG/M2

## 2021-10-07 DIAGNOSIS — T50.Z95A ADVERSE EFFECT OF VACCINE, INITIAL ENCOUNTER: Primary | ICD-10-CM

## 2021-10-07 PROCEDURE — 99214 OFFICE O/P EST MOD 30 MIN: CPT | Performed by: STUDENT IN AN ORGANIZED HEALTH CARE EDUCATION/TRAINING PROGRAM

## 2021-10-07 ASSESSMENT — PAIN SCALES - GENERAL: PAINLEVEL: SEVERE PAIN (6)

## 2021-10-07 ASSESSMENT — MIFFLIN-ST. JEOR: SCORE: 1833.39

## 2021-10-07 NOTE — NURSING NOTE
"Chief Complaint   Patient presents with     Allergic Reaction     Flu shot reaction/2 hours ago       Initial /82 (BP Location: Right arm, Patient Position: Sitting, Cuff Size: Adult Large)   Pulse 76   Temp (!) 96.5  F (35.8  C) (Temporal)   Resp 18   Ht 1.68 m (5' 6.14\")   Wt 115.9 kg (255 lb 9.6 oz)   SpO2 99%   Breastfeeding No   BMI 41.08 kg/m   Estimated body mass index is 41.08 kg/m  as calculated from the following:    Height as of this encounter: 1.68 m (5' 6.14\").    Weight as of this encounter: 115.9 kg (255 lb 9.6 oz).  Medication Reconciliation: complete    FOOD SECURITY SCREENING QUESTIONS  Hunger Vital Signs:  Within the past 12 months we worried whether our food would run out before we got money to buy more. Never  Within the past 12 months the food we bought just didn't last and we didn't have money to get more. Never  Tabitha Penn LPN 10/7/2021 3:46 PM     Tabitha Penn LPN  "

## 2021-10-07 NOTE — PATIENT INSTRUCTIONS
I recommend going to the emergency room to be seen but I understand you feel comfortable monitoring symptoms at home.   Keep epi pen close by. Make sure your  is aware and be with him   If any symptoms get worse please use epi pen and go right to the ER

## 2021-10-07 NOTE — PROGRESS NOTES
Assessment & Plan     Adverse effect of vaccine, initial encounter  History of anaphylaxis to allergy shots and unknown triggers. Egg allergy. Has not had flu shot in 14 years. 2 hours after flu shot today has change in throat sensation, headache, nausea, shakiness. Given her history, I recommend going to the ED for further evaluation and monitoring. Patient declines and prefers to monitor symptoms. Her  is driving here to follow her home and she has her epi pen on her. I again recommend patient go the ED as she could develop anaphylaxis and be unable to administer epi pen which could lead to death. She prefers not to wait in the ED waiting room and wait for any other symptoms to develop. Discussed warning signs and symptoms again with patient.   I will submit a [facility] report to adverse reaction, patient aware.       Dajuan Amezcua MD  [facility] AND [facility]    Subjective     Gwendolyn Dietrich is a 41 year old female who presents to clinic today for the following health issues     HPI     Flu shot reaction  - had flu shot about 2 hours ago   - Headache soon after (within 10 mintes)  - now throat feels tighter   - no lip swelling or tingling. No weakness numbness or tingling.   - no shortness of breath   - has some nausea and feels shaky, no emesis   - no chest pain   - Initially vision felt blurry, resolved with blinking    - last flu shot 14 years ago   - egg allergy developed about 10 years ago. At that time also had first unknown anaphylaxis reaction.   - last anaphylaxis reaction was 11 months ago to allergy shorts. That reaction progressed over 8 hours.    - took ibuprofen for headache but has not used epi pen   - injection site feels a little sore but no redness or swelling       Review of Systems   Noted in HPI       Objective    /82 (BP Location: Right arm, Patient Position: Sitting, Cuff Size: Adult Large)   Pulse 76   Temp (!) 96.5  F (35.8  C) (Temporal)   Resp 18   Ht  "1.68 m (5' 6.14\")   Wt 115.9 kg (255 lb 9.6 oz)   SpO2 99%   Breastfeeding No   BMI 41.08 kg/m    Body mass index is 41.08 kg/m .  Physical Exam   GENERAL: healthy, alert and no distress  EYES: Eyes grossly normal to inspection, PERRL and conjunctivae and sclerae normal  HENT: normal cephalic/atraumatic, nose and mouth without ulcers or lesions, oropharynx clear and oral mucous membranes moist  NECK: no adenopathy, no asymmetry, masses, or scars and thyroid normal to palpation  RESP: lungs clear to auscultation - no rales, rhonchi or wheezes  CV: regular rate and rhythm, normal S1 S2, no S3 or S4, no murmur, click or rub, no peripheral edema and peripheral pulses strong  ABDOMEN: soft, nontender, no hepatosplenomegaly, no masses and bowel sounds normal  MS: no gross musculoskeletal defects noted, no edema  SKIN: no suspicious lesions or rashes  PSYCH: mentation appears normal, affect normal/bright        "

## 2021-10-25 ENCOUNTER — LAB REQUISITION (OUTPATIENT)
Dept: LAB | Facility: OTHER | Age: 41
End: 2021-10-25

## 2021-10-25 LAB — SARS-COV-2 RNA RESP QL NAA+PROBE: NEGATIVE

## 2021-10-25 PROCEDURE — U0005 INFEC AGEN DETEC AMPLI PROBE: HCPCS | Performed by: FAMILY MEDICINE

## 2021-11-08 ENCOUNTER — ALLIED HEALTH/NURSE VISIT (OUTPATIENT)
Dept: FAMILY MEDICINE | Facility: OTHER | Age: 41
End: 2021-11-08
Attending: FAMILY MEDICINE
Payer: COMMERCIAL

## 2021-11-08 DIAGNOSIS — Z20.822 EXPOSURE TO 2019 NOVEL CORONAVIRUS: Primary | ICD-10-CM

## 2021-11-08 DIAGNOSIS — Z20.822 COVID-19 RULED OUT: ICD-10-CM

## 2021-11-08 PROCEDURE — C9803 HOPD COVID-19 SPEC COLLECT: HCPCS

## 2021-11-08 PROCEDURE — U0003 INFECTIOUS AGENT DETECTION BY NUCLEIC ACID (DNA OR RNA); SEVERE ACUTE RESPIRATORY SYNDROME CORONAVIRUS 2 (SARS-COV-2) (CORONAVIRUS DISEASE [COVID-19]), AMPLIFIED PROBE TECHNIQUE, MAKING USE OF HIGH THROUGHPUT TECHNOLOGIES AS DESCRIBED BY CMS-2020-01-R: HCPCS | Performed by: FAMILY MEDICINE

## 2021-11-09 ENCOUNTER — LAB REQUISITION (OUTPATIENT)
Dept: LAB | Facility: OTHER | Age: 41
End: 2021-11-09

## 2021-11-09 DIAGNOSIS — Z11.52 ENCOUNTER FOR SCREENING FOR COVID-19: ICD-10-CM

## 2021-11-09 LAB — SARS-COV-2 RNA RESP QL NAA+PROBE: NEGATIVE

## 2021-11-16 ENCOUNTER — OFFICE VISIT (OUTPATIENT)
Dept: FAMILY MEDICINE | Facility: OTHER | Age: 41
End: 2021-11-16
Attending: FAMILY MEDICINE
Payer: COMMERCIAL

## 2021-11-16 VITALS
TEMPERATURE: 98.6 F | RESPIRATION RATE: 16 BRPM | WEIGHT: 260.4 LBS | SYSTOLIC BLOOD PRESSURE: 126 MMHG | BODY MASS INDEX: 41.85 KG/M2 | OXYGEN SATURATION: 98 % | DIASTOLIC BLOOD PRESSURE: 84 MMHG | HEART RATE: 80 BPM

## 2021-11-16 DIAGNOSIS — R51.9 HEADACHE, CHRONIC DAILY: Primary | ICD-10-CM

## 2021-11-16 PROCEDURE — 99213 OFFICE O/P EST LOW 20 MIN: CPT | Performed by: FAMILY MEDICINE

## 2021-11-16 RX ORDER — METHYLPREDNISOLONE 4 MG
TABLET, DOSE PACK ORAL
Qty: 21 TABLET | Refills: 0 | Status: SHIPPED | OUTPATIENT
Start: 2021-11-16 | End: 2021-11-30

## 2021-11-16 ASSESSMENT — PAIN SCALES - GENERAL: PAINLEVEL: SEVERE PAIN (6)

## 2021-11-16 NOTE — PROGRESS NOTES
"Nursing Notes:   Joya Riggs MA  11/16/2021  1:21 PM  Sign at exiting of workspace  Chief Complaint   Patient presents with     Headache     1 month      Palpitations     Dizziness     Patient is here for migraine that she's had for 1 month. Also has had palpitations and light headedness.     Initial /84 (BP Location: Right arm, Patient Position: Sitting, Cuff Size: Adult Large)   Pulse 80   Temp 98.6  F (37  C) (Tympanic)   Resp 16   Wt 118.1 kg (260 lb 6.4 oz)   LMP  (LMP Unknown)   SpO2 98%   Breastfeeding No   BMI 41.85 kg/m   Estimated body mass index is 41.85 kg/m  as calculated from the following:    Height as of 10/7/21: 1.68 m (5' 6.14\").    Weight as of this encounter: 118.1 kg (260 lb 6.4 oz).  Medication Reconciliation: complete    Joya Riggs MA     FOOD SECURITY SCREENING QUESTIONS  Hunger Vital Signs:  Within the past 12 months we worried whether our food would run out before we got money to buy more. Never  Within the past 12 months the food we bought just didn't last and we didn't have money to get more. Never     Joya Riggs MA 11/16/2021 1:21 PM         SUBJECTIVE:   CC:  Gwendolyn iDetrich is a 41 year old female who presents to clinic today for the following health issues:  migraines    HPI  Gwendolyn Dietrich is a 41 year old female who presents for evaluation of migraines.  She's had migraines in the past, but this one has lasted a month.  It is always there, up and down in intensity.  It is across her forehead.  Sometimes she is light sensitive, blinking hurts.  This past week she's had times when she's felt like she forgot to breathe, then her heart will race for less than a minute - like it needs to catch up.  She will get lightheaded, like she needs to sit.    This happens several times a day - 8 times already today.    She has taken ibuprofen for this, has taken oxycodone for her headaches.    She's used ice and eye masks too.      No recent change in medications.  " Caffeine intake not daily.  No drugs.  Alcohol less than weekly.  No head trauma/concussion.     History of hysterectomy - uncertain relation of headaches to menses     Allergies   Allergen Reactions     Seasonal Allergies Anaphylaxis     Birch, Dog and Cats - has Epi-pen for this reason     Codeine GI Disturbance     Current Outpatient Medications   Medication     fexofenadine (ALLEGRA) 180 MG tablet     methylPREDNISolone (MEDROL DOSEPAK) 4 MG tablet therapy pack     SUBLINGUAL IMMUNOTHERAPY, SLIT,     EPINEPHrine (ANY BX GENERIC EQUIV) 0.3 MG/0.3ML injection 2-pack     multivitamin w/minerals (THERA-VIT-M) tablet     No current facility-administered medications for this visit.      Past Medical History:   Diagnosis Date     Obesity 5/8/2013      Past Surgical History:   Procedure Laterality Date     BACK SURGERY  01/01/2005     BIOPSY OF SKIN LESION       CHOLECYSTECTOMY  01/01/2011     HYSTERECTOMY, PAP STILL INDICATED  03/27/2012    CERVIX STILL REMAINS.     HYSTERECTOMY, SUPRACERVICAL LAPAROSCOPIC N/A 03/27/2012    Cervix and bilateral ovaries remain. Performed due to menorrhagia, precancerous cells     ORTHOPEDIC SURGERY  01/01/2005    back surgery     TONSILLECTOMY       TUBAL LIGATION  01/01/2007       Review of Systems     PHQ-2 Score:     PHQ-2 ( 1999 [employer]) 11/16/2021 10/7/2021   Q1: Little interest or pleasure in doing things 0 0   Q2: Feeling down, depressed or hopeless 0 0   PHQ-2 Score 0 0         PHQ-9 SCORE 5/7/2015   PHQ-9 Total Score 1     No flowsheet data found.          OBJECTIVE:     /84 (BP Location: Right arm, Patient Position: Sitting, Cuff Size: Adult Large)   Pulse 80   Temp 98.6  F (37  C) (Tympanic)   Resp 16   Wt 118.1 kg (260 lb 6.4 oz)   LMP  (LMP Unknown)   SpO2 98%   Breastfeeding No   BMI 41.85 kg/m    Wt Readings from Last 3 Encounters:   11/16/21 118.1 kg (260 lb 6.4 oz)   10/07/21 115.9 kg (255 lb 9.6 oz)   09/30/21 111.1 kg (245 lb)       Nursing notes and VS  reviewed.      Body mass index is 41.85 kg/m .  Physical Exam  Constitutional:       Appearance: Normal appearance.   HENT:      Right Ear: Tympanic membrane and ear canal normal.      Left Ear: Tympanic membrane and ear canal normal.      Nose:      Comments: Scant rhinorrhea  Eyes:      General:         Right eye: No discharge.         Left eye: No discharge.      Conjunctiva/sclera: Conjunctivae normal.      Pupils: Pupils are equal, round, and reactive to light.   Cardiovascular:      Rate and Rhythm: Normal rate and regular rhythm.   Pulmonary:      Breath sounds: Normal breath sounds.   Neurological:      Mental Status: She is alert.            No results found for any visits on 11/16/21.      ASSESSMENT/PLAN:     1. Headache, chronic daily        Assessment & Plan  1.  Differential diagnosis of her headaches includes chronic migraine, chronic tension, sinus infection, ICHtn   At this point, will treat with steroids which should help chronic migraine as well as sinus infection.  If after this initial treatment, she is not improving, would consider obtaining a CT scan to see if she has more chronic sinus involvement.  Patient in agreement with this plan.  Problem List Items Addressed This Visit     None      Visit Diagnoses     Headache, chronic daily    -  Primary    Relevant Medications    methylPREDNISolone (MEDROL DOSEPAK) 4 MG tablet therapy pack    Other Relevant Orders    CT Sinus w/o Contrast              TRAY BLANCO MD  [facility] AND [facility]    PDMP Review     None

## 2021-11-16 NOTE — NURSING NOTE
"Chief Complaint   Patient presents with     Headache     1 month      Palpitations     Dizziness       Initial /84 (BP Location: Right arm, Patient Position: Sitting, Cuff Size: Adult Large)   Pulse 80   Temp 98.6  F (37  C) (Tympanic)   Resp 16   Wt 118.1 kg (260 lb 6.4 oz)   LMP  (LMP Unknown)   SpO2 98%   Breastfeeding No   BMI 41.85 kg/m   Estimated body mass index is 41.85 kg/m  as calculated from the following:    Height as of 10/7/21: 1.68 m (5' 6.14\").    Weight as of this encounter: 118.1 kg (260 lb 6.4 oz).  Medication Reconciliation: complete    Joya Riggs MA       "

## 2021-11-16 NOTE — NURSING NOTE
"Chief Complaint   Patient presents with     Headache     1 month      Palpitations     Dizziness     Patient is here for migraine that she's had for 1 month. Also has had palpitations and light headedness.     Initial /84 (BP Location: Right arm, Patient Position: Sitting, Cuff Size: Adult Large)   Pulse 80   Temp 98.6  F (37  C) (Tympanic)   Resp 16   Wt 118.1 kg (260 lb 6.4 oz)   LMP  (LMP Unknown)   SpO2 98%   Breastfeeding No   BMI 41.85 kg/m   Estimated body mass index is 41.85 kg/m  as calculated from the following:    Height as of 10/7/21: 1.68 m (5' 6.14\").    Weight as of this encounter: 118.1 kg (260 lb 6.4 oz).  Medication Reconciliation: complete    Joya Riggs MA     FOOD SECURITY SCREENING QUESTIONS  Hunger Vital Signs:  Within the past 12 months we worried whether our food would run out before we got money to buy more. Never  Within the past 12 months the food we bought just didn't last and we didn't have money to get more. Never     Joya Riggs MA 11/16/2021 1:21 PM    "

## 2021-11-23 ENCOUNTER — HOSPITAL ENCOUNTER (OUTPATIENT)
Dept: CT IMAGING | Facility: OTHER | Age: 41
Discharge: HOME | End: 2021-11-23
Attending: FAMILY MEDICINE | Admitting: FAMILY MEDICINE
Payer: COMMERCIAL

## 2021-11-23 DIAGNOSIS — R51.9 HEADACHE, CHRONIC DAILY: ICD-10-CM

## 2021-11-23 PROCEDURE — 70486 CT MAXILLOFACIAL W/O DYE: CPT

## 2021-11-28 NOTE — PROGRESS NOTES
"Nursing Notes:   Joya Riggs MA  11/29/2021  2:01 PM  Signed  Chief Complaint   Patient presents with     Results     Patient is here to discuss CT results     Initial /86 (BP Location: Right arm, Patient Position: Sitting, Cuff Size: Adult Large)   Pulse 73   Temp 97.8  F (36.6  C) (Tympanic)   Resp 16   Wt 117.9 kg (260 lb)   LMP  (LMP Unknown)   SpO2 98%   Breastfeeding No   BMI 41.79 kg/m   Estimated body mass index is 41.79 kg/m  as calculated from the following:    Height as of 10/7/21: 1.68 m (5' 6.14\").    Weight as of this encounter: 117.9 kg (260 lb).  Medication Reconciliation: complete    Joya Riggs MA     FOOD SECURITY SCREENING QUESTIONS  Hunger Vital Signs:  Within the past 12 months we worried whether our food would run out before we got money to buy more. Never  Within the past 12 months the food we bought just didn't last and we didn't have money to get more. Never     Joya Riggs MA 11/29/2021 1:44 PM         SUBJECTIVE:   CC:  Gwendolyn Dietrich is a 41 year old female who presents to clinic today for the following health issues:  Follow up after MRI for headaches     HPI  Gwendolyn Dietrich is a 41 year old female who presents for follow up after MRI for headaches.   Steroids did not help her headaches at all.  They are the same in regards to intensity and frequency.  Six months ago was her most recent eye exam.  Ibuprofen has been taken for her symptoms.      Sleep:  Overall is pretty consistent (during the week is pretty consistent with waking and bedtime)  Eating:  Protein bar, lunch at work, and supper at home    Menses - history of hysterectomy - uncertain when she ovulates and/or when she would get a period    PCOS history          Allergies   Allergen Reactions     Seasonal Allergies Anaphylaxis     Birch, Dog and Cats - has Epi-pen for this reason     Codeine GI Disturbance     Current Outpatient Medications   Medication     fexofenadine (ALLEGRA) 180 MG tablet     " SUBLINGUAL IMMUNOTHERAPY, SLIT,     Riboflavin (B-2) 100 MG TABS     No current facility-administered medications for this visit.      Past Medical History:   Diagnosis Date     Obesity 5/8/2013      Past Surgical History:   Procedure Laterality Date     BACK SURGERY  01/01/2005     BIOPSY OF SKIN LESION       CHOLECYSTECTOMY  01/01/2011     HYSTERECTOMY, PAP STILL INDICATED  03/27/2012    CERVIX STILL REMAINS.     HYSTERECTOMY, SUPRACERVICAL LAPAROSCOPIC N/A 03/27/2012    Cervix and bilateral ovaries remain. Performed due to menorrhagia, precancerous cells     ORTHOPEDIC SURGERY  01/01/2005    back surgery     TONSILLECTOMY       TUBAL LIGATION  01/01/2007       Review of Systems     PHQ-2 Score:     PHQ-2 ( 1999 [employer]) 11/29/2021 11/16/2021   Q1: Little interest or pleasure in doing things 0 0   Q2: Feeling down, depressed or hopeless 0 0   PHQ-2 Score 0 0   PHQ-2 Total Score (12-17 Years)- Positive if 3 or more points; Administer PHQ-A if positive - -         PHQ-9 SCORE 5/7/2015   PHQ-9 Total Score 1     No flowsheet data found.          OBJECTIVE:     /86 (BP Location: Right arm, Patient Position: Sitting, Cuff Size: Adult Large)   Pulse 73   Temp 97.8  F (36.6  C) (Tympanic)   Resp 16   Wt 117.9 kg (260 lb)   LMP  (LMP Unknown)   SpO2 98%   Breastfeeding No   BMI 41.79 kg/m    Wt Readings from Last 3 Encounters:   11/29/21 117.9 kg (260 lb)   11/16/21 118.1 kg (260 lb 6.4 oz)   10/07/21 115.9 kg (255 lb 9.6 oz)       Nursing notes and VS reviewed.      Body mass index is 41.79 kg/m .  Physical Exam  Constitutional:       Appearance: Normal appearance.   HENT:      Head: Normocephalic.      Right Ear: Tympanic membrane normal.      Left Ear: Tympanic membrane normal.      Ears:      Comments: No frontal or maxillary sinus tenderness  Neurological:      Mental Status: She is alert.          Results for orders placed or performed in visit on 11/29/21   Comprehensive Metabolic Panel     Status:  Normal   Result Value Ref Range    Sodium 136 134 - 144 mmol/L    Potassium 4.0 3.5 - 5.1 mmol/L    Chloride 103 98 - 107 mmol/L    Carbon Dioxide (CO2) 27 21 - 31 mmol/L    Anion Gap 6 3 - 14 mmol/L    Urea Nitrogen 12 7 - 25 mg/dL    Creatinine 0.88 0.60 - 1.20 mg/dL    Calcium 9.2 8.6 - 10.3 mg/dL    Glucose 83 70 - 105 mg/dL    Alkaline Phosphatase 41 34 - 104 U/L    AST 17 13 - 39 U/L    ALT 20 7 - 52 U/L    Protein Total 6.9 6.4 - 8.9 g/dL    Albumin 4.5 3.5 - 5.7 g/dL    Bilirubin Total 0.4 0.3 - 1.0 mg/dL    GFR Estimate 81 >60 mL/min/1.73m2   Hemoglobin A1c     Status: Normal   Result Value Ref Range    Hemoglobin A1C 4.9 4.0 - 6.2 %   TSH     Status: Normal   Result Value Ref Range    TSH 3.36 0.40 - 4.00 mU/L   Sedimentation Rate (ESR)     Status: Normal   Result Value Ref Range    Erythrocyte Sedimentation Rate 16 0 - 20 mm/hr   CRP inflammation     Status: Normal   Result Value Ref Range    CRP Inflammation 4.8 <10.0 mg/L   Estradiol     Status: None   Result Value Ref Range    Estradiol 28 pg/mL   FSH     Status: None   Result Value Ref Range    FSH 4.8 pg/mL   CBC W PLT No Diff     Status: Normal   Result Value Ref Range    WBC Count 6.2 4.0 - 11.0 10e3/uL    RBC Count 4.20 3.80 - 5.20 10e6/uL    Hemoglobin 12.5 11.7 - 15.7 g/dL    Hematocrit 36.0 35.0 - 47.0 %    MCV 86 78 - 100 fL    MCH 29.8 26.5 - 33.0 pg    MCHC 34.7 31.5 - 36.5 g/dL    RDW 12.3 10.0 - 15.0 %    Platelet Count 236 150 - 450 10e3/uL         ASSESSMENT/PLAN:     1. Headache, chronic daily        Assessment & Plan  1.  Patient states she wanted to have labs done first, looking at why she is having more migraines.  Labs obtained as above.  These do not point to a clear cause for change in her migraines.  Discussed sleep, dietary changes.  Discussed starting daily preventive medication.  She wishes to start with vitamin B2 200-400mg a day.  Will also refer to neurology for consultation.    Problem List Items Addressed This Visit      None      Visit Diagnoses     Headache, chronic daily    -  Primary    Relevant Orders    FSH (Completed)    Estradiol (Completed)    CRP inflammation (Completed)    Sedimentation Rate (ESR) (Completed)    TSH (Completed)    Hemoglobin A1c (Completed)    Comprehensive Metabolic Panel (Completed)    CBC W PLT No Diff (Completed)        CT previously reviewed.          TRAY BLANCO MD  [facility] AND \[facility]\""    PDMP Review     None

## 2021-11-29 ENCOUNTER — OFFICE VISIT (OUTPATIENT)
Dept: FAMILY MEDICINE | Facility: OTHER | Age: 41
End: 2021-11-29
Attending: FAMILY MEDICINE
Payer: COMMERCIAL

## 2021-11-29 VITALS
TEMPERATURE: 97.8 F | DIASTOLIC BLOOD PRESSURE: 86 MMHG | OXYGEN SATURATION: 98 % | HEART RATE: 73 BPM | BODY MASS INDEX: 41.79 KG/M2 | RESPIRATION RATE: 16 BRPM | WEIGHT: 260 LBS | SYSTOLIC BLOOD PRESSURE: 124 MMHG

## 2021-11-29 DIAGNOSIS — R51.9 HEADACHE, CHRONIC DAILY: Primary | ICD-10-CM

## 2021-11-29 LAB
ALBUMIN SERPL-MCNC: 4.5 G/DL (ref 3.5–5.7)
ALP SERPL-CCNC: 41 U/L (ref 34–104)
ALT SERPL W P-5'-P-CCNC: 20 U/L (ref 7–52)
ANION GAP SERPL CALCULATED.3IONS-SCNC: 6 MMOL/L (ref 3–14)
AST SERPL W P-5'-P-CCNC: 17 U/L (ref 13–39)
BILIRUB SERPL-MCNC: 0.4 MG/DL (ref 0.3–1)
BUN SERPL-MCNC: 12 MG/DL (ref 7–25)
CALCIUM SERPL-MCNC: 9.2 MG/DL (ref 8.6–10.3)
CHLORIDE BLD-SCNC: 103 MMOL/L (ref 98–107)
CO2 SERPL-SCNC: 27 MMOL/L (ref 21–31)
CREAT SERPL-MCNC: 0.88 MG/DL (ref 0.6–1.2)
CRP SERPL-MCNC: 4.8 MG/L
ERYTHROCYTE [DISTWIDTH] IN BLOOD BY AUTOMATED COUNT: 12.3 % (ref 10–15)
ERYTHROCYTE [SEDIMENTATION RATE] IN BLOOD BY WESTERGREN METHOD: 16 MM/HR (ref 0–20)
ESTRADIOL SERPL-MCNC: 28 PG/ML
FSH SERPL-ACNC: 4.8 PG/ML
GFR SERPL CREATININE-BSD FRML MDRD: 81 ML/MIN/1.73M2
GLUCOSE BLD-MCNC: 83 MG/DL (ref 70–105)
HBA1C MFR BLD: 4.9 % (ref 4–6.2)
HCT VFR BLD AUTO: 36 % (ref 35–47)
HGB BLD-MCNC: 12.5 G/DL (ref 11.7–15.7)
MCH RBC QN AUTO: 29.8 PG (ref 26.5–33)
MCHC RBC AUTO-ENTMCNC: 34.7 G/DL (ref 31.5–36.5)
MCV RBC AUTO: 86 FL (ref 78–100)
PLATELET # BLD AUTO: 236 10E3/UL (ref 150–450)
POTASSIUM BLD-SCNC: 4 MMOL/L (ref 3.5–5.1)
PROT SERPL-MCNC: 6.9 G/DL (ref 6.4–8.9)
RBC # BLD AUTO: 4.2 10E6/UL (ref 3.8–5.2)
SODIUM SERPL-SCNC: 136 MMOL/L (ref 134–144)
TSH SERPL DL<=0.005 MIU/L-ACNC: 3.36 MU/L (ref 0.4–4)
WBC # BLD AUTO: 6.2 10E3/UL (ref 4–11)

## 2021-11-29 PROCEDURE — 84443 ASSAY THYROID STIM HORMONE: CPT | Mod: ZL | Performed by: FAMILY MEDICINE

## 2021-11-29 PROCEDURE — 99214 OFFICE O/P EST MOD 30 MIN: CPT | Performed by: FAMILY MEDICINE

## 2021-11-29 PROCEDURE — 86140 C-REACTIVE PROTEIN: CPT | Mod: ZL | Performed by: FAMILY MEDICINE

## 2021-11-29 PROCEDURE — 36415 COLL VENOUS BLD VENIPUNCTURE: CPT | Mod: ZL | Performed by: FAMILY MEDICINE

## 2021-11-29 PROCEDURE — 82670 ASSAY OF TOTAL ESTRADIOL: CPT | Mod: ZL | Performed by: FAMILY MEDICINE

## 2021-11-29 PROCEDURE — 80053 COMPREHEN METABOLIC PANEL: CPT | Mod: ZL | Performed by: FAMILY MEDICINE

## 2021-11-29 PROCEDURE — 85652 RBC SED RATE AUTOMATED: CPT | Mod: ZL | Performed by: FAMILY MEDICINE

## 2021-11-29 PROCEDURE — 83036 HEMOGLOBIN GLYCOSYLATED A1C: CPT | Mod: ZL | Performed by: FAMILY MEDICINE

## 2021-11-29 PROCEDURE — 85014 HEMATOCRIT: CPT | Mod: ZL | Performed by: FAMILY MEDICINE

## 2021-11-29 PROCEDURE — 83001 ASSAY OF GONADOTROPIN (FSH): CPT | Mod: ZL | Performed by: FAMILY MEDICINE

## 2021-11-29 ASSESSMENT — PAIN SCALES - GENERAL: PAINLEVEL: SEVERE PAIN (6)

## 2021-11-29 NOTE — NURSING NOTE
"Chief Complaint   Patient presents with     Results     Patient is here to discuss CT results     Initial /86 (BP Location: Right arm, Patient Position: Sitting, Cuff Size: Adult Large)   Pulse 73   Temp 97.8  F (36.6  C) (Tympanic)   Resp 16   Wt 117.9 kg (260 lb)   LMP  (LMP Unknown)   SpO2 98%   Breastfeeding No   BMI 41.79 kg/m   Estimated body mass index is 41.79 kg/m  as calculated from the following:    Height as of 10/7/21: 1.68 m (5' 6.14\").    Weight as of this encounter: 117.9 kg (260 lb).  Medication Reconciliation: complete    Joya Riggs MA     FOOD SECURITY SCREENING QUESTIONS  Hunger Vital Signs:  Within the past 12 months we worried whether our food would run out before we got money to buy more. Never  Within the past 12 months the food we bought just didn't last and we didn't have money to get more. Never     Joya Riggs MA 11/29/2021 1:44 PM    "

## 2021-12-01 ENCOUNTER — MYC MEDICAL ADVICE (OUTPATIENT)
Dept: FAMILY MEDICINE | Facility: OTHER | Age: 41
End: 2021-12-01
Payer: COMMERCIAL

## 2021-12-01 DIAGNOSIS — G43.709 CHRONIC MIGRAINE WITHOUT AURA WITHOUT STATUS MIGRAINOSUS, NOT INTRACTABLE: Primary | ICD-10-CM

## 2021-12-01 RX ORDER — RIBOFLAVIN (VITAMIN B2) 100 MG
200 TABLET ORAL
COMMUNITY
Start: 2021-12-01 | End: 2022-06-01

## 2021-12-09 ENCOUNTER — MYC MEDICAL ADVICE (OUTPATIENT)
Dept: FAMILY MEDICINE | Facility: OTHER | Age: 41
End: 2021-12-09
Payer: COMMERCIAL

## 2021-12-09 NOTE — TELEPHONE ENCOUNTER
Patient had OV 11/29/21, notes below.   Pati Kenneth OLIVASN .............12/9/2021     1:54 PM        1.  Patient states she wanted to have labs done first, looking at why she is having more migraines.  Labs obtained as above.  These do not point to a clear cause for change in her migraines.  Discussed sleep, dietary changes.  Discussed starting daily preventive medication.  She wishes to start with vitamin B2 200-400mg a day.  Will also refer to neurology for consultation.

## 2021-12-22 ENCOUNTER — HOSPITAL ENCOUNTER (OUTPATIENT)
Dept: ULTRASOUND IMAGING | Facility: OTHER | Age: 41
End: 2021-12-22
Attending: STUDENT IN AN ORGANIZED HEALTH CARE EDUCATION/TRAINING PROGRAM
Payer: COMMERCIAL

## 2021-12-22 ENCOUNTER — OFFICE VISIT (OUTPATIENT)
Dept: FAMILY MEDICINE | Facility: OTHER | Age: 41
End: 2021-12-22
Attending: STUDENT IN AN ORGANIZED HEALTH CARE EDUCATION/TRAINING PROGRAM
Payer: COMMERCIAL

## 2021-12-22 VITALS
RESPIRATION RATE: 16 BRPM | HEIGHT: 66 IN | DIASTOLIC BLOOD PRESSURE: 82 MMHG | TEMPERATURE: 98.1 F | HEART RATE: 72 BPM | WEIGHT: 259 LBS | OXYGEN SATURATION: 98 % | BODY MASS INDEX: 41.62 KG/M2 | SYSTOLIC BLOOD PRESSURE: 124 MMHG

## 2021-12-22 DIAGNOSIS — M79.662 PAIN OF LEFT CALF: ICD-10-CM

## 2021-12-22 DIAGNOSIS — M79.662 PAIN OF LEFT CALF: Primary | ICD-10-CM

## 2021-12-22 LAB — MAGNESIUM SERPL-MCNC: 1.9 MG/DL (ref 1.9–2.7)

## 2021-12-22 PROCEDURE — 83735 ASSAY OF MAGNESIUM: CPT | Mod: ZL | Performed by: STUDENT IN AN ORGANIZED HEALTH CARE EDUCATION/TRAINING PROGRAM

## 2021-12-22 PROCEDURE — 99213 OFFICE O/P EST LOW 20 MIN: CPT | Performed by: STUDENT IN AN ORGANIZED HEALTH CARE EDUCATION/TRAINING PROGRAM

## 2021-12-22 PROCEDURE — 93970 EXTREMITY STUDY: CPT

## 2021-12-22 PROCEDURE — 36415 COLL VENOUS BLD VENIPUNCTURE: CPT | Mod: ZL | Performed by: STUDENT IN AN ORGANIZED HEALTH CARE EDUCATION/TRAINING PROGRAM

## 2021-12-22 RX ORDER — EPINEPHRINE 0.3 MG/.3ML
0.3 INJECTION SUBCUTANEOUS PRN
COMMUNITY
End: 2023-04-17

## 2021-12-22 ASSESSMENT — PAIN SCALES - GENERAL: PAINLEVEL: MILD PAIN (3)

## 2021-12-22 ASSESSMENT — MIFFLIN-ST. JEOR: SCORE: 1848.79

## 2021-12-22 NOTE — PROGRESS NOTES
"  Assessment & Plan     Pain of left calf  ddx includes muscle spasm vs DVT vs muscle strain. Will check electrolytes and order US for DVT. Could be a muscle strain. Supportive cares at home.   - US Lower Extremity Venous Duplex Bilateral; Future  - Magnesium; Future  - Magnesium  - Basic Metabolic Panel; Future    Dajuan Amezcua MD  [facility]    Angel Snow is a 41 year old who presents for the following health issues     HPI     Last night was walking and had sudden left calf pain and tightening. On going pain today  Pain is constant  Her mother had a DVT at age 20 and again later on and is on blood thinners  No hormonal use, no recent long car or plane trips/prolonged sitting. No current tobacco use  No leg cramps or restless legs at bedtime   No known trauma   Is worried about a DVT            Review of Systems   Constitutional, HEENT, cardiovascular, pulmonary, gi and gu systems are negative, except as otherwise noted.      Objective    /82   Pulse 72   Temp 98.1  F (36.7  C)   Resp 16   Ht 1.68 m (5' 6.14\")   Wt 117.5 kg (259 lb)   LMP  (LMP Unknown)   SpO2 98%   BMI 41.63 kg/m    Body mass index is 41.63 kg/m .  Physical Exam   GENERAL: healthy, alert and no distress  MS: Left calf: tender and tense compared to right calf. Tenderness at IT band insertion. Right calf: no tenderness to palpation. Normal gait and ROM bilateral lower extremities.   SKIN: no suspicious lesions or rashes  PSYCH: mentation appears normal, affect normal/bright    Results for orders placed or performed in visit on 12/22/21 (from the past 24 hour(s))   Magnesium   Result Value Ref Range    Magnesium 1.9 1.9 - 2.7 mg/dL               "

## 2021-12-22 NOTE — ED NOTES
Pt states she feels better, throat is less sore and doesn't feel swollen.   Up to BR, steady.  Still c/o feeling chilled.   FREE:[LAST:[pcp],PHONE:[(   )    -],FAX:[(   )    -],FOLLOWUP:[1 week]],FREE:[LAST:[Cora],PHONE:[phone]],FAX:[(   )    -],FOLLOWUP:[1 week]],FREE:[LAST:[Wasil],PHONE:[(   )    -],FAX:[(   )    -],FOLLOWUP:[1 week]]

## 2021-12-22 NOTE — NURSING NOTE
Patient presents today for left lower leg pain that started last night.     Medication Reconciliation Complete    Richa Boudreaux LPN  12/22/2021 2:00 PM

## 2021-12-29 ENCOUNTER — LAB REQUISITION (OUTPATIENT)
Dept: LAB | Facility: OTHER | Age: 41
End: 2021-12-29

## 2021-12-29 DIAGNOSIS — Z11.52 ENCOUNTER FOR SCREENING FOR COVID-19: ICD-10-CM

## 2021-12-29 LAB
FLUAV RNA SPEC QL NAA+PROBE: NEGATIVE
FLUBV RNA RESP QL NAA+PROBE: NEGATIVE
RSV RNA SPEC NAA+PROBE: NEGATIVE
SARS-COV-2 RNA RESP QL NAA+PROBE: NEGATIVE

## 2021-12-29 PROCEDURE — 87637 SARSCOV2&INF A&B&RSV AMP PRB: CPT | Performed by: FAMILY MEDICINE

## 2021-12-31 ENCOUNTER — LAB REQUISITION (OUTPATIENT)
Dept: LAB | Facility: OTHER | Age: 41
End: 2021-12-31

## 2021-12-31 DIAGNOSIS — Z11.52 ENCOUNTER FOR SCREENING FOR COVID-19: ICD-10-CM

## 2021-12-31 PROCEDURE — 87637 SARSCOV2&INF A&B&RSV AMP PRB: CPT | Performed by: FAMILY MEDICINE

## 2022-01-21 ENCOUNTER — ANCILLARY PROCEDURE (OUTPATIENT)
Dept: MAMMOGRAPHY | Facility: OTHER | Age: 42
End: 2022-01-21
Attending: FAMILY MEDICINE
Payer: COMMERCIAL

## 2022-01-21 ENCOUNTER — TELEPHONE (OUTPATIENT)
Dept: FAMILY MEDICINE | Facility: OTHER | Age: 42
End: 2022-01-21
Payer: COMMERCIAL

## 2022-01-21 DIAGNOSIS — Z12.31 VISIT FOR SCREENING MAMMOGRAM: ICD-10-CM

## 2022-01-21 DIAGNOSIS — N64.52 BLOODY DISCHARGE FROM NIPPLE: Primary | ICD-10-CM

## 2022-01-21 PROCEDURE — 77066 DX MAMMO INCL CAD BI: CPT | Mod: TC | Performed by: RADIOLOGY

## 2022-01-21 NOTE — TELEPHONE ENCOUNTER
Patient in exam room needs to have mammogram order changed to state bilateral diagnostic mammogram and sign asap.   Patient states having breast pain for past two weeks so needs to be done as a diagnostic screen.     Avain Fraga on 1/21/2022 at 9:20 AM

## 2022-01-24 ENCOUNTER — HOSPITAL ENCOUNTER (OUTPATIENT)
Dept: ULTRASOUND IMAGING | Facility: OTHER | Age: 42
Discharge: HOME | End: 2022-01-24
Attending: FAMILY MEDICINE | Admitting: FAMILY MEDICINE
Payer: COMMERCIAL

## 2022-01-24 DIAGNOSIS — N64.4 PAIN OF RIGHT BREAST: ICD-10-CM

## 2022-01-24 DIAGNOSIS — N63.10 BREAST MASS, RIGHT: ICD-10-CM

## 2022-01-24 DIAGNOSIS — N63.0 BREAST MASS: Primary | ICD-10-CM

## 2022-01-24 PROCEDURE — 76642 ULTRASOUND BREAST LIMITED: CPT | Mod: RT

## 2022-01-24 RX ORDER — LIDOCAINE HYDROCHLORIDE AND EPINEPHRINE 10; 10 MG/ML; UG/ML
20 INJECTION, SOLUTION INFILTRATION; PERINEURAL
Status: DISCONTINUED | OUTPATIENT
Start: 2022-01-25 | End: 2022-06-01

## 2022-01-25 ENCOUNTER — TELEPHONE (OUTPATIENT)
Dept: ULTRASOUND IMAGING | Facility: OTHER | Age: 42
End: 2022-01-25
Payer: COMMERCIAL

## 2022-01-25 ENCOUNTER — HOSPITAL ENCOUNTER (OUTPATIENT)
Dept: ULTRASOUND IMAGING | Facility: OTHER | Age: 42
End: 2022-01-25
Attending: FAMILY MEDICINE
Payer: COMMERCIAL

## 2022-01-25 ENCOUNTER — HOSPITAL ENCOUNTER (OUTPATIENT)
Dept: MAMMOGRAPHY | Facility: OTHER | Age: 42
End: 2022-01-25
Attending: FAMILY MEDICINE
Payer: COMMERCIAL

## 2022-01-25 VITALS
RESPIRATION RATE: 16 BRPM | DIASTOLIC BLOOD PRESSURE: 70 MMHG | OXYGEN SATURATION: 97 % | SYSTOLIC BLOOD PRESSURE: 130 MMHG | HEART RATE: 67 BPM | TEMPERATURE: 97.9 F

## 2022-01-25 DIAGNOSIS — N64.4 PAIN OF RIGHT BREAST: ICD-10-CM

## 2022-01-25 DIAGNOSIS — N63.10 BREAST MASS, RIGHT: ICD-10-CM

## 2022-01-25 PROCEDURE — 19083 BX BREAST 1ST LESION US IMAG: CPT | Mod: RT

## 2022-01-25 PROCEDURE — 999N000065 MA POST PROCEDURE RIGHT

## 2022-01-25 PROCEDURE — 250N000009 HC RX 250: Performed by: RADIOLOGY

## 2022-01-25 PROCEDURE — 88305 TISSUE EXAM BY PATHOLOGIST: CPT

## 2022-01-25 RX ORDER — LIDOCAINE HYDROCHLORIDE AND EPINEPHRINE 10; 10 MG/ML; UG/ML
20 INJECTION, SOLUTION INFILTRATION; PERINEURAL ONCE
Status: COMPLETED | OUTPATIENT
Start: 2022-01-25 | End: 2022-01-25

## 2022-01-25 RX ADMIN — LIDOCAINE HYDROCHLORIDE 10 ML: 10 INJECTION, SOLUTION INFILTRATION; PERINEURAL at 09:46

## 2022-01-25 RX ADMIN — LIDOCAINE HYDROCHLORIDE,EPINEPHRINE BITARTRATE 20 ML: 10; .01 INJECTION, SOLUTION INFILTRATION; PERINEURAL at 09:46

## 2022-01-25 NOTE — DISCHARGE INSTRUCTIONS
"NEEDLE BIOPSY BREAST    Activity: Rest the remainder of the day. You may resume normal activity after the next day. Avoid any vigorous/strenuous physical activity for 24 hours.    Comfort: If you have discomfort or tenderness at the site you may take your usual or recommended pain medication. Do not take aspirin the day of the procedure or for 48 hours following the biopsy.    Diet: You may resume your usual diet.    Care of site: Leave ice pack in place for 4 hours, or until it is no longer cold. The ice pack is reusable and may be refrozen.  Keep your bra and the dressing on for 24 hours. Then you may remove the bandage and shower. If there are steri-strips you may remove them in 3 to 5 days.  You may have some discomfort and a small amount of bruising where the biopsy was performed. This is normal. For several days or even a couple of weeks, you may have tenderness or \"twinges\" and a tiny bump where the needle went into the skin. This can be bothersome, but is not abnormal. You can use warm moist washcloths, as this may help. Do Not Use A Heating Pad.    RETURN TO THE EMERGENCY ROOM FOR:   Shortness of breath   Rapid heart rate   If pain becomes worse    Call Your Doctor For:    A fever over 101 degrees   Increased redness, increased swelling, and/or persistent drainage/discomfort around the site    Other: At the end of your breast biopsy, a tiny titanium clip will be inserted through the biopsy needle and placed at the biopsy site within your breast. The marker provides a landmark of the biopsy for further mammograms or surgical procedures. This marker is MRI compatible and poses no known health risks.      If results are benign imaging may still recommended in 6 months by the radiologist after they review your pathology report. Our Radiology Department will call you to schedule this appointment.    For questions, problems, or concerns contact the Radiology Department at [phone].      "

## 2022-01-25 NOTE — TELEPHONE ENCOUNTER
Called patient to check on status post ultrasound right breast biopsy.  Patient reports having some mild pain and tenderness.  Patient using ice packs to help with discomfort.  Patient reports no bleeding.  Patient verbalizes understanding of importance of attending results appointment with Dr. Arthur on 1/27/22 at 1030 am.  Maude Santos RN

## 2022-01-25 NOTE — DISCHARGE INSTR - SUPPLEMENTARY INSTRUCTIONS
It was a privilege caring for you today. I aim to give you the highest quality care, spending time to review your chart to be familiar with your particular health issues. Thank you for allowing me to serve you today. If you have any questions or concerns, or you have ideas for how I could have improved your care today, please don't hesitate to reach me at my office number Monday - Friday from 8:00 am - 4:30 pm at [phone]. I wish you wellness. It was my pleasure to meet you.     Warmly,       Sharita STREET, RN - [facility] Imaging Department

## 2022-01-26 ENCOUNTER — TELEPHONE (OUTPATIENT)
Dept: FAMILY MEDICINE | Facility: OTHER | Age: 42
End: 2022-01-26
Payer: COMMERCIAL

## 2022-01-26 LAB
PATH REPORT.COMMENTS IMP SPEC: NORMAL
PATH REPORT.FINAL DX SPEC: NORMAL
PHOTO IMAGE: NORMAL

## 2022-01-26 NOTE — TELEPHONE ENCOUNTER
Patient would like results from her biopsy from 01/25/2022 as soon as possible.     Please contact patient at [phone]    Araclei Lowry on 1/26/2022 at 3:48 PM

## 2022-01-27 ENCOUNTER — MYC MEDICAL ADVICE (OUTPATIENT)
Dept: FAMILY MEDICINE | Facility: OTHER | Age: 42
End: 2022-01-27
Payer: COMMERCIAL

## 2022-01-27 ENCOUNTER — OFFICE VISIT (OUTPATIENT)
Dept: SURGERY | Facility: OTHER | Age: 42
End: 2022-01-27
Attending: SURGERY
Payer: COMMERCIAL

## 2022-01-27 VITALS
OXYGEN SATURATION: 99 % | TEMPERATURE: 99 F | SYSTOLIC BLOOD PRESSURE: 138 MMHG | HEART RATE: 74 BPM | RESPIRATION RATE: 16 BRPM | DIASTOLIC BLOOD PRESSURE: 86 MMHG

## 2022-01-27 DIAGNOSIS — Z83.719 FAMILY HISTORY OF COLONIC POLYPS: Primary | ICD-10-CM

## 2022-01-27 DIAGNOSIS — R92.8 ABNORMAL MAMMOGRAM OF RIGHT BREAST: Primary | ICD-10-CM

## 2022-01-27 PROCEDURE — 99203 OFFICE O/P NEW LOW 30 MIN: CPT | Performed by: SURGERY

## 2022-01-27 ASSESSMENT — PAIN SCALES - GENERAL: PAINLEVEL: MILD PAIN (2)

## 2022-01-27 NOTE — NURSING NOTE
"Chief Complaint   Patient presents with     Consult     right breast       Initial BP (!) 148/82 (BP Location: Right arm, Patient Position: Sitting, Cuff Size: Adult Large)   Pulse 74   Temp 99  F (37.2  C) (Tympanic)   Resp 16   LMP  (LMP Unknown)   SpO2 99%  Estimated body mass index is 41.63 kg/m  as calculated from the following:    Height as of 12/22/21: 1.68 m (5' 6.14\").    Weight as of 12/22/21: 117.5 kg (259 lb).  Medication Reconciliation: complete    How many children do you have? 2  What age did your menstrual cycle start? 10  How old were you when your first child was born? 21  Did you breast feed? no  Are you on or have you ever taken any hormone replacement or birth control? Birth control  Do you have a family history of breast cancer? unknown  Tracy Lamas LPN..........1/27/2022  10:44 AM      "

## 2022-01-27 NOTE — PROGRESS NOTES
OFFICE CONSULTATION NOTE  Patient Name: Gwendolyn Dietrich  Address: [address] [place] [id number]  Age:41 year old  Sex: female     Primary Care Physician: Alfonso Alvarez MD    I was requested to see this patient in consultation by Alfonso Alvarez MD for evaluation of right breast nodule on mammogram. A copy of this note will be sent to Alfonso Alvarez MD.    HPI:   The patient is 41 year old female with a new nodule noted in the right breast on recent mammogram. The patient hasn't noted any skin, nipple or breast changes. No previous breast cancer. No previous breast biopsy. No family history of breast cancer. The patient had biopsy performed that showed cyst wall fragment with non-proliferative fibrocystic change No atypia or malignancy noted.      CONSULTATION ASSESSMENT AND PLAN/RECOMMENDATIONS:   I discussed with the patient the pathophysiology of microcalcifications and breast disease. We specifically discussed that most abnormalities seen on mammogram and US are not breast cancer. I explained that fibrocystic change and breast cysts are not a precancerous condition and that they don't increase future risk for breast cancer. I recommended a 6 month right breast mammogram and US to follow up breast changes after the recent biopsy. The patient expressed understanding and denies further questions. The patient will call with questions or concerns.     REVIEW OF SYSTEMS  GENERAL: No fevers or chills. Denies fatigue, recent weight loss.  HEENT: No sinus drainage. No changeswith vision or hearing. No difficulty swallowing.   LYMPHATICS:  No swollen nodes in axilla, neck or groin.  CARDIOVASCULAR: Denies chest pain, palpitations and dyspnea on exertion.  PULMONARY: No shortness of breath or cough. No increase in sputum production.  GI: Denies melena, bright red blood in stools. No hematemesis. No constipation or diarrhea.  : No dysuria or hematuria.  SKIN: No recent rashes or ulcers.   HEMATOLOGY:  No history of  easy bruising or bleeding.  ENDOCRINE:  No history of diabetes or thyroid problems.  NEUROLOGY:  No history of seizures or headaches. No motor or sensory changes.  BREAST:  Denies breast pain or changes.  Past Medical History:   Diagnosis Date     Obesity 5/8/2013     Past Surgical History:   Procedure Laterality Date     BACK SURGERY  01/01/2005     BIOPSY OF SKIN LESION       CHOLECYSTECTOMY  01/01/2011     HYSTERECTOMY, PAP STILL INDICATED  03/27/2012    CERVIX STILL REMAINS.     HYSTERECTOMY, SUPRACERVICAL LAPAROSCOPIC N/A 03/27/2012    Cervix and bilateral ovaries remain. Performed due to menorrhagia, precancerous cells     ORTHOPEDIC SURGERY  01/01/2005    back surgery     TONSILLECTOMY       TUBAL LIGATION  01/01/2007     Current Outpatient Medications   Medication     EPINEPHrine (ANY BX GENERIC EQUIV) 0.3 MG/0.3ML injection 2-pack     fexofenadine (ALLEGRA) 180 MG tablet     Riboflavin (B-2) 100 MG TABS     Current Facility-Administered Medications   Medication     lidocaine 1 % 10 mL     lidocaine 1% with EPINEPHrine 1:100,000 injection 20 mL     Allergies   Allergen Reactions     Seasonal Allergies Anaphylaxis     Birch, Dog and Cats - has Epi-pen for this reason     Codeine GI Disturbance     Family History   Problem Relation Age of Onset     Hearing Loss Father      Lipids Father      Hypertension Father      Diabetes Father      Lipids Mother      Hypertension Mother      Irritable Bowel Syndrome Mother      Obesity Mother      Rheumatoid Arthritis Mother      Diabetes Mother      No Known Problems Sister      Cancer Maternal Grandmother      Osteoporosis Maternal Grandmother      Cancer Paternal Grandmother      Social History     Socioeconomic History     Marital status:      Spouse name: None     Number of children: None     Years of education: None     Highest education level: None   Occupational History     None   Tobacco Use     Smoking status: Former Smoker     Types: Cigarettes     Quit  date: 2008     Years since quittin.7     Smokeless tobacco: Never Used   Vaping Use     Vaping Use: Never used   Substance and Sexual Activity     Alcohol use: Yes     Comment: occasionally - a couple times a month     Drug use: No     Sexual activity: Yes     Partners: Male     Birth control/protection: Female Surgical   Other Topics Concern     Parent/sibling w/ CABG, MI or angioplasty before 65F 55M? No      Service Not Asked     Blood Transfusions Not Asked     Caffeine Concern Not Asked     Occupational Exposure Not Asked     Hobby Hazards Not Asked     Sleep Concern Not Asked     Stress Concern Not Asked     Weight Concern Not Asked     Special Diet Not Asked     Back Care Not Asked     Exercise Not Asked     Bike Helmet Not Asked     Seat Belt Yes     Self-Exams Not Asked   Social History Narrative     None     Social Determinants of Health     Financial Resource Strain: Not on file   Food Insecurity: Not on file   Transportation Needs: Not on file   Physical Activity: Not on file   Stress: Not on file   Social Connections: Not on file   Intimate Partner Violence: Not on file   Housing Stability: Not on file     The above history was reviewed today, 2022  PHYSICAL EXAM  /86 (BP Location: Right arm, Patient Position: Sitting, Cuff Size: Adult Large)   Pulse 74   Temp 99  F (37.2  C) (Tympanic)   Resp 16   LMP  (LMP Unknown)   SpO2 99%   GENERAL: Healthy appearing patient in no acute distress. Pleasant and cooperative with exam and interview.   HEENT:Head-normocephalic. Eyes-no scleral icterus. Nose-no nasal drainage. No lesions. Mouth-oral mucosa pink and moist, no lesions.  NECK: Supple. No thyroid nodules. Trachea midline.  LYMPHATICS:  No cervical, axillary or supraclavicular adenopathy.  CV: Regular rate and rhythm, no murmurs. No peripheral edema.  LUNGS:  No respiratory distress. Clear bilaterally to auscultation.  ABDOMEN: Non distended. Bowel sounds active. Soft,  non-tender, no hepatosplenomegaly or hernias. No peritoneal signs.  SKIN: Pink, warm and dry. No jaundice. No rash.  NEURO:  Cranial nerves II-XII grossly intact. Alert and oriented.  PSYCH: Appropriate mood and affect.  BREAST: Breasts were examined in the seated and supine position. Post surgical change bilaterally with well healed incisions. No mass noted bilaterally. No nipple changes or discharge bilaterally. Post biopsy changes noted right breast. Resolving ecchymosis with no sign of infection. Appropriate tenderness noted.    IMAGING/LAB  I personally reviewed patient's recent mammogram,US and biopsy images and reports and pathology report.

## 2022-01-31 DIAGNOSIS — Z01.818 PRE-OP TESTING: Primary | ICD-10-CM

## 2022-01-31 DIAGNOSIS — Z12.11 SPECIAL SCREENING FOR MALIGNANT NEOPLASMS, COLON: ICD-10-CM

## 2022-01-31 NOTE — TELEPHONE ENCOUNTER
Screening Questions for the Scheduling of Screening Colonoscopies   (If Colonoscopy is diagnostic, Provider should review the chart before scheduling.)  Are you younger than 50 or older than 80?  YES  Do you take aspirin or fish oil?  NO (if yes, tell patient to stop 1 week prior to Colonoscopy)  Do you take warfarin (Coumadin), clopidogrel (Plavix), apixaban (Eliquis), dabigatram (Pradaxa), rivaroxaban (Xarelto) or any blood thinner? NO  Do you use oxygen at home?  NO  Do you have kidney disease? NO  Are you on dialysis? NO  Have you had a stroke or heart attack in the last year? NO  Have you had a stent in your heart or any blood vessel in the last year? NO  Have you had a transplant of any organ? NO  Have you had a colonoscopy or upper endoscopy (EGD) before? NO         When?  NA  Date of scheduled Colonoscopy. 06/08/2022  Provider [place]  Pharmacy [facility]

## 2022-02-01 RX ORDER — POLYETHYLENE GLYCOL 3350, SODIUM CHLORIDE, SODIUM BICARBONATE, POTASSIUM CHLORIDE 420; 11.2; 5.72; 1.48 G/4L; G/4L; G/4L; G/4L
4000 POWDER, FOR SOLUTION ORAL ONCE
Qty: 4000 ML | Refills: 0 | Status: SHIPPED | OUTPATIENT
Start: 2022-02-01 | End: 2022-02-01

## 2022-02-01 RX ORDER — BISACODYL 5 MG/1
TABLET, DELAYED RELEASE ORAL
Qty: 2 TABLET | Refills: 0 | Status: ON HOLD | OUTPATIENT
Start: 2022-02-01 | End: 2022-06-08

## 2022-03-05 ENCOUNTER — HOSPITAL ENCOUNTER (EMERGENCY)
Facility: HOSPITAL | Age: 42
Discharge: HOME | End: 2022-03-05
Attending: PHYSICIAN ASSISTANT | Admitting: PHYSICIAN ASSISTANT
Payer: COMMERCIAL

## 2022-03-05 VITALS
TEMPERATURE: 98.5 F | SYSTOLIC BLOOD PRESSURE: 141 MMHG | HEART RATE: 66 BPM | RESPIRATION RATE: 16 BRPM | DIASTOLIC BLOOD PRESSURE: 83 MMHG | OXYGEN SATURATION: 97 %

## 2022-03-05 DIAGNOSIS — J06.9 URI (UPPER RESPIRATORY INFECTION): ICD-10-CM

## 2022-03-05 DIAGNOSIS — J06.9 UPPER RESPIRATORY TRACT INFECTION, UNSPECIFIED TYPE: ICD-10-CM

## 2022-03-05 LAB — GROUP A STREP BY PCR: NOT DETECTED

## 2022-03-05 PROCEDURE — G0463 HOSPITAL OUTPT CLINIC VISIT: HCPCS

## 2022-03-05 PROCEDURE — 99213 OFFICE O/P EST LOW 20 MIN: CPT | Performed by: PHYSICIAN ASSISTANT

## 2022-03-05 PROCEDURE — 87651 STREP A DNA AMP PROBE: CPT | Performed by: PHYSICIAN ASSISTANT

## 2022-03-05 RX ORDER — IBUPROFEN 200 MG
200 TABLET ORAL EVERY 4 HOURS PRN
COMMUNITY

## 2022-03-05 ASSESSMENT — ENCOUNTER SYMPTOMS
SHORTNESS OF BREATH: 0
COUGH: 1
CHILLS: 1
SORE THROAT: 1

## 2022-03-05 NOTE — ED PROVIDER NOTES
History     Chief Complaint   Patient presents with     Pharyngitis     HPI  Gwendolyn Dietrich is a 41 year old female who presents to urgent care for evaluation of cold symptoms.  Since this past Tuesday patient has had sore throat, bilateral ear pain, cough, chills.  She denies any measured fever, nausea, vomiting, diarrhea, myalgias, or any other associated symptoms.  Patient is requesting strep test.  She denies Covid testing at this time.    Allergies:  Allergies   Allergen Reactions     Seasonal Allergies Anaphylaxis     Birch, Dog and Cats - has Epi-pen for this reason     Codeine GI Disturbance       Problem List:    Patient Active Problem List    Diagnosis Date Noted     Morbid obesity (H) 05/25/2021     Priority: Medium     Dyslipidemia 01/23/2018     Priority: Medium     Migraine headache 01/23/2018     Priority: Medium     Overview:   Recurrent migraine and tension headaches       ACP (advance care planning) 11/01/2016     Priority: Medium     Advance Care Planning 11/1/2016: ACP Review of Chart / Resources Provided:  Reviewed chart for advance care plan.  Gwendolyn Dietrich has no plan or code status on file. Discussed available resources and provided with information. Confirmed code status reflects current choices pending further ACP discussions.  Confirmed/documented legally designated decision makers.  Added by MARAH CALDERON             Pain in joint 12/10/2013     Priority: Medium     Obesity 05/08/2013     Priority: Medium     History of hysterectomy, supracervical 03/27/2012     Priority: Medium     Still needs to have PAP/HPV screening.        Hereditary and idiopathic peripheral neuropathy 09/09/2011     Priority: Medium        Past Medical History:    Past Medical History:   Diagnosis Date     Obesity 5/8/2013       Past Surgical History:    Past Surgical History:   Procedure Laterality Date     BACK SURGERY  01/01/2005     BIOPSY OF SKIN LESION       CHOLECYSTECTOMY  01/01/2011      HYSTERECTOMY, PAP STILL INDICATED  2012    CERVIX STILL REMAINS.     HYSTERECTOMY, SUPRACERVICAL LAPAROSCOPIC N/A 2012    Cervix and bilateral ovaries remain. Performed due to menorrhagia, precancerous cells     ORTHOPEDIC SURGERY  2005    back surgery     TONSILLECTOMY       TUBAL LIGATION  2007       Family History:    Family History   Adopted: Yes   Problem Relation Age of Onset     Lipids Mother      Hypertension Mother      Irritable Bowel Syndrome Mother      Obesity Mother      Rheumatoid Arthritis Mother      Diabetes Mother      Hearing Loss Father      Lipids Father      Hypertension Father      Diabetes Father      No Known Problems Sister      Cancer Maternal Grandmother      Osteoporosis Maternal Grandmother      Cancer Paternal Grandmother        Social History:  Marital Status:   [2]  Social History     Tobacco Use     Smoking status: Former Smoker     Types: Cigarettes     Quit date: 2008     Years since quittin.8     Smokeless tobacco: Never Used   Vaping Use     Vaping Use: Never used   Substance Use Topics     Alcohol use: Yes     Comment: occasionally - a couple times a month     Drug use: No        Medications:    fexofenadine (ALLEGRA) 180 MG tablet  ibuprofen (ADVIL/MOTRIN) 200 MG tablet  Riboflavin (B-2) 100 MG TABS  bisacodyl (DULCOLAX) 5 MG EC tablet  EPINEPHrine (ANY BX GENERIC EQUIV) 0.3 MG/0.3ML injection 2-pack          Review of Systems   Constitutional: Positive for chills.   HENT: Positive for ear pain and sore throat.    Respiratory: Positive for cough. Negative for shortness of breath.    All other systems reviewed and are negative.      Physical Exam   BP: 141/83  Pulse: 66  Temp: 98.5  F (36.9  C)  Resp: 16  SpO2: 97 %      Physical Exam  Vitals and nursing note reviewed.   Constitutional:       Appearance: Normal appearance. She is well-developed. She is not ill-appearing.   HENT:      Right Ear: Tympanic membrane normal.      Left Ear:  Tympanic membrane normal.      Mouth/Throat:      Mouth: Mucous membranes are moist.      Pharynx: No oropharyngeal exudate or posterior oropharyngeal erythema.   Eyes:      Conjunctiva/sclera: Conjunctivae normal.      Pupils: Pupils are equal, round, and reactive to light.   Cardiovascular:      Rate and Rhythm: Regular rhythm.      Heart sounds: Normal heart sounds.   Pulmonary:      Effort: Pulmonary effort is normal.      Breath sounds: Normal breath sounds.   Neurological:      Mental Status: She is alert and oriented to person, place, and time.         ED Course                 Procedures             Critical Care time:               No results found for this or any previous visit (from the past 24 hour(s)).    Medications - No data to display    Assessments & Plan (with Medical Decision Making)   #1.  URI    Discussed exam findings with patient.  Patient strep test pending will be notified of abnormal results.  She is instructed on multiple supportive cares for her symptoms.  Push fluids and rest.  Tylenol or ibuprofen as directed for pain or fever.  Any additional concerns please return to urgent care or follow-up with primary care provider.  Patient verbalized understanding and agreement of plan.          I have reviewed the nursing notes.    I have reviewed the findings, diagnosis, plan and need for follow up with the patient.    New Prescriptions    No medications on file       Final diagnoses:   URI (upper respiratory infection)       3/5/2022   [facility]     Ivan Roche PA-C  03/05/22 1112

## 2022-03-05 NOTE — ED TRIAGE NOTES
Patient presents to urgent care for sore throat that began Tuesday. Patient states bilateral ear pain but left is worse.   Patient denies a COVID test today. Would like a strep only.

## 2022-03-14 ENCOUNTER — TELEPHONE (OUTPATIENT)
Dept: ALLERGY | Facility: OTHER | Age: 42
End: 2022-03-14
Payer: COMMERCIAL

## 2022-03-14 DIAGNOSIS — J30.89 PERENNIAL ALLERGIC RHINITIS: Primary | ICD-10-CM

## 2022-03-14 NOTE — TELEPHONE ENCOUNTER
Patient switched from allergy drops (SLIT to allergy injections (SCIT).  New order pended for review and signature.  Please review and sign, thank you!    Dominik Bull RN on 3/14/2022 at 4:48 PM

## 2022-03-23 ENCOUNTER — ALLIED HEALTH/NURSE VISIT (OUTPATIENT)
Dept: ALLERGY | Facility: OTHER | Age: 42
End: 2022-03-23
Attending: PHYSICIAN ASSISTANT
Payer: COMMERCIAL

## 2022-03-23 ENCOUNTER — TELEPHONE (OUTPATIENT)
Dept: ALLERGY | Facility: OTHER | Age: 42
End: 2022-03-23

## 2022-03-23 DIAGNOSIS — J30.89 PERENNIAL ALLERGIC RHINITIS: Primary | ICD-10-CM

## 2022-03-23 PROCEDURE — 95165 ANTIGEN THERAPY SERVICES: CPT | Performed by: PHYSICIAN ASSISTANT

## 2022-03-23 NOTE — PROGRESS NOTES
Allergy serum is mixed today at [facility],  into  2  (5 ml)  multi dose vial/vials.    Allergens included were:    Ragweed  0.2 ml of dilution # 1  Pigweed  0.2 ml of dilution # 1  Mugwort 0.2 ml of dilution # 0  Kochia  0.2 ml of dilution # 0  Russian Thistle 0.2 ml of dilution # 1  Alfonso Grass 0.2 ml of dilution # 1  Birch mix 0.2 ml of dilution # 1  Maple Mix 0.2 of dilution # 1  Elm Mix 0.2 ml of dilution # 0  Oak Mix 0.2 ml of dilution # 0  Toribio Mix 0.2 ml of dilution # 0  Pine Mix 0.2 ml of dilution # 0  Eastern Donna 0.2 ml of dilution # 1  Black [place] 0.2 ml of dilution # 1  Aspen 0.2 ml of dilution # 0  Red [place] 0.2 ml of dilution # 0    Alternaria 0.2 ml of dilution # 1  Aspergillus 0.2 ml of dilution # 1  Hormodendrum 0.2 ml of dilution # 1  Helminthosporium 0.2 ml of dilution # 1  Penicillium 0.2 ml of dilution # 1  Epicoccum 0.2 ml of dilution # 1  Fusarium 0.2 ml of dilution # 1  Mucor 0.2 ml of dilution # 0  Grain Smut 0.2 ml of dilution # 0  Grass Smut 0.2 ml of dilution # 0  Cat 0.2 ml of dilution # 1  Dog 0.2 ml of dilution # 1  Feather Mix 0.2 ml of dilution # 0  Dust Mite Mix 0.2 ml of dilution # 1  Horse 0.2 ml of dilution # 0    Dominik Bull RN on 3/23/2022 at 9:59 AM

## 2022-03-23 NOTE — TELEPHONE ENCOUNTER
Discontinued the old order and updated with new directions: Restart allergy injections (SCIT). Start at Red vial at 0.05ml and follow standard build up protocol. Once 0.5ml is reached, she will do weekly injections x 6 months, then advance to every other week injections.  Please, review and sign, thank you!    Dominik Bull RN on 3/23/2022 at 10:05 AM

## 2022-03-31 ENCOUNTER — MEDICAL CORRESPONDENCE (OUTPATIENT)
Dept: MRI IMAGING | Facility: HOSPITAL | Age: 42
End: 2022-03-31
Payer: COMMERCIAL

## 2022-04-04 ENCOUNTER — OFFICE VISIT (OUTPATIENT)
Dept: ALLERGY | Facility: OTHER | Age: 42
End: 2022-04-04
Attending: PHYSICIAN ASSISTANT
Payer: COMMERCIAL

## 2022-04-04 DIAGNOSIS — J30.89 PERENNIAL ALLERGIC RHINITIS: Primary | ICD-10-CM

## 2022-04-04 PROCEDURE — 95117 IMMUNOTHERAPY INJECTIONS: CPT

## 2022-04-04 NOTE — PROGRESS NOTES
Prior to injection patient identity verified using name and date of birth.    SVT done on right arm, measuring 7 mm.  Passed  SVT done on left arm, measuring 8 mm.  Passed    Documented on paper flowsheet.    Allergy injection/s given and charted on paper allergy flow sheet.  Patient left AMA, signing form, not staying for the observation period.    Dominik Bull RN on 4/4/2022 at 8:25 AM

## 2022-04-08 ENCOUNTER — HOSPITAL ENCOUNTER (OUTPATIENT)
Dept: MRI IMAGING | Facility: HOSPITAL | Age: 42
Discharge: HOME | End: 2022-04-08
Attending: STUDENT IN AN ORGANIZED HEALTH CARE EDUCATION/TRAINING PROGRAM | Admitting: STUDENT IN AN ORGANIZED HEALTH CARE EDUCATION/TRAINING PROGRAM
Payer: COMMERCIAL

## 2022-04-08 DIAGNOSIS — G43.E09 CHRONIC MIGRAINE WITH AURA: ICD-10-CM

## 2022-04-08 PROCEDURE — A9585 GADOBUTROL INJECTION: HCPCS | Performed by: RADIOLOGY

## 2022-04-08 PROCEDURE — 255N000002 HC RX 255 OP 636: Performed by: RADIOLOGY

## 2022-04-08 PROCEDURE — 70553 MRI BRAIN STEM W/O & W/DYE: CPT

## 2022-04-08 RX ORDER — GADOBUTROL 604.72 MG/ML
10 INJECTION INTRAVENOUS ONCE
Status: COMPLETED | OUTPATIENT
Start: 2022-04-08 | End: 2022-04-08

## 2022-04-08 RX ADMIN — GADOBUTROL 10 ML: 604.72 INJECTION INTRAVENOUS at 09:13

## 2022-04-11 ENCOUNTER — ALLIED HEALTH/NURSE VISIT (OUTPATIENT)
Dept: ALLERGY | Facility: OTHER | Age: 42
End: 2022-04-11
Attending: PHYSICIAN ASSISTANT
Payer: COMMERCIAL

## 2022-04-11 DIAGNOSIS — J30.89 PERENNIAL ALLERGIC RHINITIS: Primary | ICD-10-CM

## 2022-04-11 PROCEDURE — 95117 IMMUNOTHERAPY INJECTIONS: CPT

## 2022-04-11 NOTE — PROGRESS NOTES
Prior to injection verified pt identity using pt name and date of birth.    Allergy injection/s given and charted on paper allergy flow sheet.  Patient left AMA, signing form, not staying for the observation period.    Dominik Bull RN on 4/11/2022 at 11:02 AM

## 2022-04-18 ENCOUNTER — ALLIED HEALTH/NURSE VISIT (OUTPATIENT)
Dept: ALLERGY | Facility: OTHER | Age: 42
End: 2022-04-18
Attending: PHYSICIAN ASSISTANT
Payer: COMMERCIAL

## 2022-04-18 DIAGNOSIS — J30.89 PERENNIAL ALLERGIC RHINITIS: Primary | ICD-10-CM

## 2022-04-18 PROCEDURE — 95117 IMMUNOTHERAPY INJECTIONS: CPT

## 2022-04-18 NOTE — PROGRESS NOTES
Prior to injection verified pt identity using pt name and date of birth.    Allergy injection/s given and charted on paper allergy flow sheet.  Patient left AMA, signing form, not staying for the observation period.    Dominik Bull RN on 4/18/2022 at 8:24 AM

## 2022-04-25 ENCOUNTER — ALLIED HEALTH/NURSE VISIT (OUTPATIENT)
Dept: ALLERGY | Facility: OTHER | Age: 42
End: 2022-04-25
Attending: PHYSICIAN ASSISTANT
Payer: COMMERCIAL

## 2022-04-25 DIAGNOSIS — J30.89 PERENNIAL ALLERGIC RHINITIS: Primary | ICD-10-CM

## 2022-04-25 PROCEDURE — 95117 IMMUNOTHERAPY INJECTIONS: CPT

## 2022-04-25 NOTE — PROGRESS NOTES
Prior to injection verified pt identity using pt name and date of birth.    Allergy injection/s given and charted on paper allergy flow sheet.  Patient left AMA, signing form, not staying for the observation period.    Dominik Bull RN on 4/25/2022 at 8:29 AM

## 2022-05-03 ENCOUNTER — ALLIED HEALTH/NURSE VISIT (OUTPATIENT)
Dept: ALLERGY | Facility: OTHER | Age: 42
End: 2022-05-03
Attending: PHYSICIAN ASSISTANT
Payer: COMMERCIAL

## 2022-05-03 DIAGNOSIS — J30.89 PERENNIAL ALLERGIC RHINITIS: Primary | ICD-10-CM

## 2022-05-03 PROCEDURE — 95117 IMMUNOTHERAPY INJECTIONS: CPT

## 2022-05-03 NOTE — PROGRESS NOTES
Prior to injection verified pt identity using pt name and date of birth.    Allergy injection/s given and charted on paper allergy flow sheet.  Patient left AMA, signing form, not staying for the observation period.    Dominik Bull RN on 5/3/2022 at 8:13 AM

## 2022-05-09 ENCOUNTER — ALLIED HEALTH/NURSE VISIT (OUTPATIENT)
Dept: ALLERGY | Facility: OTHER | Age: 42
End: 2022-05-09
Attending: PHYSICIAN ASSISTANT
Payer: COMMERCIAL

## 2022-05-09 DIAGNOSIS — J30.89 PERENNIAL ALLERGIC RHINITIS: Primary | ICD-10-CM

## 2022-05-09 PROCEDURE — 95115 IMMUNOTHERAPY ONE INJECTION: CPT

## 2022-05-09 NOTE — PROGRESS NOTES
Prior to injection verified pt identity using pt name and date of birth.    Allergy injection/s given and charted on paper allergy flow sheet.  Patient left AMA, signing form, not staying for the observation period.    Dominik Bull RN on 5/9/2022 at 8:14 AM

## 2022-05-17 ENCOUNTER — ALLIED HEALTH/NURSE VISIT (OUTPATIENT)
Dept: ALLERGY | Facility: OTHER | Age: 42
End: 2022-05-17
Attending: PHYSICIAN ASSISTANT
Payer: COMMERCIAL

## 2022-05-17 DIAGNOSIS — J30.89 PERENNIAL ALLERGIC RHINITIS: Primary | ICD-10-CM

## 2022-05-17 PROCEDURE — 95117 IMMUNOTHERAPY INJECTIONS: CPT

## 2022-05-17 NOTE — PROGRESS NOTES
Prior to injection verified pt identity using pt name and date of birth.    Allergy injection/s given and charted on paper allergy flow sheet.  Patient left AMA, signing form, not staying for the observation period.    Dominik Solorio RN on 5/17/2022 at 10:45 AM

## 2022-05-23 ENCOUNTER — ALLIED HEALTH/NURSE VISIT (OUTPATIENT)
Dept: ALLERGY | Facility: OTHER | Age: 42
End: 2022-05-23
Attending: PHYSICIAN ASSISTANT
Payer: COMMERCIAL

## 2022-05-23 ENCOUNTER — MYC MEDICAL ADVICE (OUTPATIENT)
Dept: SURGERY | Facility: OTHER | Age: 42
End: 2022-05-23
Payer: COMMERCIAL

## 2022-05-23 DIAGNOSIS — J30.89 PERENNIAL ALLERGIC RHINITIS: Primary | ICD-10-CM

## 2022-05-23 PROCEDURE — 95117 IMMUNOTHERAPY INJECTIONS: CPT

## 2022-05-23 NOTE — PROGRESS NOTES
Prior to injection verified pt identity using pt name and date of birth.    Allergy injection/s given and charted on paper allergy flow sheet.  Patient left AMA, signing form, not staying for the observation period.    Dominik Solorio RN on 5/23/2022 at 8:16 AM

## 2022-05-31 ENCOUNTER — ALLIED HEALTH/NURSE VISIT (OUTPATIENT)
Dept: ALLERGY | Facility: OTHER | Age: 42
End: 2022-05-31
Attending: PHYSICIAN ASSISTANT
Payer: COMMERCIAL

## 2022-05-31 DIAGNOSIS — J30.89 PERENNIAL ALLERGIC RHINITIS: Primary | ICD-10-CM

## 2022-05-31 PROCEDURE — 95117 IMMUNOTHERAPY INJECTIONS: CPT

## 2022-05-31 NOTE — PROGRESS NOTES
Prior to injection verified pt identity using pt name and date of birth.    Allergy injection/s given and charted on paper allergy flow sheet.  Patient left AMA, signing form, not staying for the observation period.    Dominik Solorio RN on 5/31/2022 at 10:33 AM

## 2022-06-04 ENCOUNTER — ALLIED HEALTH/NURSE VISIT (OUTPATIENT)
Dept: FAMILY MEDICINE | Facility: OTHER | Age: 42
End: 2022-06-04
Attending: SURGERY
Payer: COMMERCIAL

## 2022-06-04 DIAGNOSIS — Z01.818 PRE-OP TESTING: ICD-10-CM

## 2022-06-04 PROCEDURE — C9803 HOPD COVID-19 SPEC COLLECT: HCPCS

## 2022-06-04 PROCEDURE — U0003 INFECTIOUS AGENT DETECTION BY NUCLEIC ACID (DNA OR RNA); SEVERE ACUTE RESPIRATORY SYNDROME CORONAVIRUS 2 (SARS-COV-2) (CORONAVIRUS DISEASE [COVID-19]), AMPLIFIED PROBE TECHNIQUE, MAKING USE OF HIGH THROUGHPUT TECHNOLOGIES AS DESCRIBED BY CMS-2020-01-R: HCPCS | Mod: ZL

## 2022-06-05 LAB — SARS-COV-2 RNA RESP QL NAA+PROBE: NEGATIVE

## 2022-06-06 ENCOUNTER — ALLIED HEALTH/NURSE VISIT (OUTPATIENT)
Dept: ALLERGY | Facility: OTHER | Age: 42
End: 2022-06-06
Attending: PHYSICIAN ASSISTANT
Payer: COMMERCIAL

## 2022-06-06 DIAGNOSIS — J30.89 PERENNIAL ALLERGIC RHINITIS: Primary | ICD-10-CM

## 2022-06-06 PROCEDURE — 95117 IMMUNOTHERAPY INJECTIONS: CPT

## 2022-06-06 NOTE — PROGRESS NOTES
Prior to injection verified pt identity using pt name and date of birth.    Allergy injection/s given and charted on paper allergy flow sheet.  Patient left AMA, signing form, not staying for the observation period.    Dominik Solorio RN on 6/6/2022 at 8:26 AM

## 2022-06-08 ENCOUNTER — ANESTHESIA (OUTPATIENT)
Dept: SURGERY | Facility: OTHER | Age: 42
End: 2022-06-08
Payer: COMMERCIAL

## 2022-06-08 ENCOUNTER — HOSPITAL ENCOUNTER (OUTPATIENT)
Facility: OTHER | Age: 42
Discharge: HOME | End: 2022-06-08
Attending: SURGERY | Admitting: SURGERY
Payer: COMMERCIAL

## 2022-06-08 ENCOUNTER — ANESTHESIA EVENT (OUTPATIENT)
Dept: SURGERY | Facility: OTHER | Age: 42
End: 2022-06-08
Payer: COMMERCIAL

## 2022-06-08 VITALS
HEIGHT: 65 IN | SYSTOLIC BLOOD PRESSURE: 110 MMHG | DIASTOLIC BLOOD PRESSURE: 77 MMHG | TEMPERATURE: 97.8 F | OXYGEN SATURATION: 99 % | BODY MASS INDEX: 41.65 KG/M2 | HEART RATE: 76 BPM | RESPIRATION RATE: 16 BRPM | WEIGHT: 250 LBS

## 2022-06-08 DIAGNOSIS — K63.5 POLYP OF TRANSVERSE COLON, UNSPECIFIED TYPE: Primary | ICD-10-CM

## 2022-06-08 PROCEDURE — 45380 COLONOSCOPY AND BIOPSY: CPT | Performed by: SURGERY

## 2022-06-08 PROCEDURE — 45385 COLONOSCOPY W/LESION REMOVAL: CPT | Mod: PT | Performed by: SURGERY

## 2022-06-08 PROCEDURE — 258N000003 HC RX IP 258 OP 636: Performed by: SURGERY

## 2022-06-08 PROCEDURE — 45385 COLONOSCOPY W/LESION REMOVAL: CPT | Performed by: NURSE ANESTHETIST, CERTIFIED REGISTERED

## 2022-06-08 PROCEDURE — 88305 TISSUE EXAM BY PATHOLOGIST: CPT

## 2022-06-08 PROCEDURE — 250N000011 HC RX IP 250 OP 636: Performed by: NURSE ANESTHETIST, CERTIFIED REGISTERED

## 2022-06-08 PROCEDURE — 258N000003 HC RX IP 258 OP 636: Performed by: NURSE ANESTHETIST, CERTIFIED REGISTERED

## 2022-06-08 RX ORDER — ONDANSETRON 2 MG/ML
4 INJECTION INTRAMUSCULAR; INTRAVENOUS EVERY 6 HOURS PRN
Status: DISCONTINUED | OUTPATIENT
Start: 2022-06-08 | End: 2022-06-08 | Stop reason: HOSPADM

## 2022-06-08 RX ORDER — NALOXONE HYDROCHLORIDE 0.4 MG/ML
0.2 INJECTION, SOLUTION INTRAMUSCULAR; INTRAVENOUS; SUBCUTANEOUS
Status: DISCONTINUED | OUTPATIENT
Start: 2022-06-08 | End: 2022-06-08 | Stop reason: HOSPADM

## 2022-06-08 RX ORDER — SODIUM CHLORIDE, SODIUM LACTATE, POTASSIUM CHLORIDE, CALCIUM CHLORIDE 600; 310; 30; 20 MG/100ML; MG/100ML; MG/100ML; MG/100ML
INJECTION, SOLUTION INTRAVENOUS CONTINUOUS
Status: DISCONTINUED | OUTPATIENT
Start: 2022-06-08 | End: 2022-06-08 | Stop reason: HOSPADM

## 2022-06-08 RX ORDER — LIDOCAINE 40 MG/G
CREAM TOPICAL
Status: DISCONTINUED | OUTPATIENT
Start: 2022-06-08 | End: 2022-06-08 | Stop reason: HOSPADM

## 2022-06-08 RX ORDER — PROPOFOL 10 MG/ML
INJECTION, EMULSION INTRAVENOUS CONTINUOUS PRN
Status: DISCONTINUED | OUTPATIENT
Start: 2022-06-08 | End: 2022-06-08

## 2022-06-08 RX ORDER — FLUMAZENIL 0.1 MG/ML
0.2 INJECTION, SOLUTION INTRAVENOUS
Status: DISCONTINUED | OUTPATIENT
Start: 2022-06-08 | End: 2022-06-08 | Stop reason: HOSPADM

## 2022-06-08 RX ORDER — PROCHLORPERAZINE MALEATE 5 MG
10 TABLET ORAL EVERY 6 HOURS PRN
Status: DISCONTINUED | OUTPATIENT
Start: 2022-06-08 | End: 2022-06-08 | Stop reason: HOSPADM

## 2022-06-08 RX ORDER — SODIUM CHLORIDE, SODIUM LACTATE, POTASSIUM CHLORIDE, CALCIUM CHLORIDE 600; 310; 30; 20 MG/100ML; MG/100ML; MG/100ML; MG/100ML
INJECTION, SOLUTION INTRAVENOUS CONTINUOUS PRN
Status: DISCONTINUED | OUTPATIENT
Start: 2022-06-08 | End: 2022-06-08

## 2022-06-08 RX ORDER — ONDANSETRON 4 MG/1
4 TABLET, ORALLY DISINTEGRATING ORAL EVERY 6 HOURS PRN
Status: DISCONTINUED | OUTPATIENT
Start: 2022-06-08 | End: 2022-06-08 | Stop reason: HOSPADM

## 2022-06-08 RX ORDER — ONDANSETRON 2 MG/ML
4 INJECTION INTRAMUSCULAR; INTRAVENOUS
Status: DISCONTINUED | OUTPATIENT
Start: 2022-06-08 | End: 2022-06-08 | Stop reason: HOSPADM

## 2022-06-08 RX ORDER — NALOXONE HYDROCHLORIDE 0.4 MG/ML
0.4 INJECTION, SOLUTION INTRAMUSCULAR; INTRAVENOUS; SUBCUTANEOUS
Status: DISCONTINUED | OUTPATIENT
Start: 2022-06-08 | End: 2022-06-08 | Stop reason: HOSPADM

## 2022-06-08 RX ADMIN — SODIUM CHLORIDE, SODIUM LACTATE, POTASSIUM CHLORIDE, AND CALCIUM CHLORIDE: 600; 310; 30; 20 INJECTION, SOLUTION INTRAVENOUS at 10:00

## 2022-06-08 RX ADMIN — PROPOFOL 170 MCG/KG/MIN: 10 INJECTION, EMULSION INTRAVENOUS at 10:03

## 2022-06-08 RX ADMIN — SODIUM CHLORIDE, SODIUM LACTATE, POTASSIUM CHLORIDE, AND CALCIUM CHLORIDE: .6; .31; .03; .02 INJECTION, SOLUTION INTRAVENOUS at 09:14

## 2022-06-08 NOTE — ANESTHESIA CARE TRANSFER NOTE
Patient: Gwendolyn Dietrich    Procedure: Procedure(s):  COLONOSCOPY, WITH POLYPECTOMY       Diagnosis: Family history of polyps in the colon [Z83.71]  Diagnosis Additional Information: No value filed.    Anesthesia Type:   MAC     Note:      Level of Consciousness: drowsy  Oxygen Supplementation: face mask  Level of Supplemental Oxygen (L/min / FiO2): 8 L/min  Independent Airway: airway patency satisfactory and stable  Dentition: dentition unchanged  Vital Signs Stable: post-procedure vital signs reviewed and stable  Report to RN Given: handoff report given  Patient transferred to: Phase II    Handoff Report: Identifed the Patient, Identified the Reponsible Provider, Reviewed the pertinent medical history, Discussed the surgical course, Reviewed Intra-OP anesthesia mangement and issues during anesthesia, Set expectations for post-procedure period and Allowed opportunity for questions and acknowledgement of understanding      Vitals:  Vitals Value Taken Time   BP     Temp     Pulse     Resp     SpO2 100 % 06/08/22 1026   Vitals shown include unvalidated device data.    Electronically Signed By: David Kellerman, APRN CRNA  June 8, 2022  10:26 AM

## 2022-06-08 NOTE — H&P
"PRE-PROCEDURE NOTE    CHIEF COMPLAINT / REASON FORPROCEDURE:  Need for screening colonoscopy.    PERTINENT HISTORY   Patient with no complaints. Previous colonoscopy none. No diarrhea, constipation, abdominal pain or rectal bleeding. Family history of colon cancer.  Past Medical History:   Diagnosis Date     Obesity 5/8/2013     Past Surgical History:   Procedure Laterality Date     BACK SURGERY  01/01/2005     BIOPSY OF SKIN LESION       CHOLECYSTECTOMY  01/01/2011     HYSTERECTOMY, PAP STILL INDICATED  03/27/2012    CERVIX STILL REMAINS.     HYSTERECTOMY, SUPRACERVICAL LAPAROSCOPIC N/A 03/27/2012    Cervix and bilateral ovaries remain. Performed due to menorrhagia, precancerous cells     ORTHOPEDIC SURGERY  01/01/2005    back surgery     TONSILLECTOMY       TUBAL LIGATION  01/01/2007     Other:  None  Bleeding tendencies:  No    Relevant Family History:  yes, as above    Relevant Social History:  none    A relevant review of systems was performed and was Negative.    ALLERGIES/SENSITIVITIES:   Allergies   Allergen Reactions     Seasonal Allergies Anaphylaxis     Birch, Dog and Cats - has Epi-pen for this reason     Codeine GI Disturbance        CURRENTMEDICATIONS:    No current facility-administered medications on file prior to encounter.  EPINEPHrine (ANY BX GENERIC EQUIV) 0.3 MG/0.3ML injection 2-pack, Inject 0.3 mg into the muscle as needed for anaphylaxis  fexofenadine (ALLEGRA) 180 MG tablet, Take 1 tablet (180 mg) by mouth daily      Current Facility-Administered Medications   Medication     lactated ringers infusion     lidocaine (LMX4) cream     lidocaine 1 % 0.1-1 mL     ondansetron (ZOFRAN) injection 4 mg     sodium chloride (PF) 0.9% PF flush 3 mL     sodium chloride (PF) 0.9% PF flush 3 mL       PRE-SEDATION ASSESSMENT:    /83   Pulse 68   Temp 98.2  F (36.8  C) (Tympanic)   Resp 18   Ht 1.651 m (5' 5\")   Wt 113.4 kg (250 lb)   LMP  (LMP Unknown)   SpO2 99%   BMI 41.60 kg/m    Lung Exam:  " Normal  Heart Exam:  Normal    Comment(s):      IMPRESSION:  Need for screening colonoscopy.    PLAN:  I discussed screening colonoscopy with the patient. Anesthesia coverage requested due to BMI more than 40.

## 2022-06-08 NOTE — OP NOTE
PROCEDURE NOTE    SURGEON: Tasha Sparks MD.    PRE-OP DIAGNOSIS:  Screening Colonoscopy      POST-OP DIAGNOSIS: colon polyps     Location: Transverse x 2 Size: 0.4 cm  Removed:  Y         PROCEDURE:  Colonoscopy with polypectomies cold snare    ESTIMATEDBLOOD LOSS: none    COMPLICATIONS:  None    SPECIMEN:  Transverse colon polyps    ANESTHESIA:  See anesthesia note, anesthesia requested due to: ASA class 3, BMI more than 40    INDICATION FOR THE PROCEDURE: The patient is a 41 year old female. The patient has no complaints. I explained to the patient the risks, benefits and alternatives to screening colonoscopy for evaluating the colon for colon polyps and colon cancer. We specifically discussed the risks of bleeding, infection, perforation, potential inability to reach the cecum and the risks of sedation. The patient's questions were answered and the patient wished to proceed. Informed consent paperwork was completed.    PROCEDURE: The patient was taken to the endoscopy suite. Appropriate monitors were attached. The patient was placed in the left lateral decubitus position.Timeout was performed confirming the patient's identity and procedure to be performed. After appropriate sedation was confirmed, digital rectal exam was performed. There was normal tone and no gross abnormality was noted. The lubricated colonoscope was introduced into the anus the colon was insufflated with air. The prep quality was adequate. Under direct visualization the scope was advanced to the cecum. The ileocecal valve was intubated and the terminal ileum inspected. No gross abnormality was noted. The scope was withdrawn back into the cecum. The mucosa of colon was inspected while withdrawing the scope. Two small sessile polyps were noted in the transverse colon and removed with cold snare. The scope was retroflexed in the rectum and the anorectal junction was inspected. No abnormalities were noted. The scope was returned to a neutral  position and the colon was decompressed. The scope was removed. The patient tolerated the procedure with no immediately apparent complication. The patient was taken to recovery in stable condition.  FOLLOW UP:  RECOMMEND high fiber diet, follow up: will call with pathology results.

## 2022-06-08 NOTE — DISCHARGE INSTRUCTIONS
Procedure you had done: colonoscopy with removal of polyps  Your health care provider is:  Alfonso Alvarez  Your surgeon is Dr. Tasha Arthur.   Please call your health care provider or surgeon at [phone] if:    - you feel you are getting worse or having an increase in problems    - fever greater than 101 degrees  - increasing shortness of breath or chest pain  - any signs of infection (increasing redness, swelling, tenderness, warmth, change in appearance, or  increased drainage)  - blood in your urine or stool  - coughing or vomiting blood  - nausea (upset stomach) and vomiting and/or diarrhea that will not stop  - severe pain that is not relieved by medicine, rest or ice  You have had medications for sedation. Please be aware that this can cause drowsiness and impaired judgment for up to 24 hours after your procedure. Do not drive, operate power tools or drink alcohol for 24 hours.  If samples were taken-you will get a phone call and a letter with your results in the next 7-10 days. If you don't get results, please call and let us know!

## 2022-06-08 NOTE — ANESTHESIA POSTPROCEDURE EVALUATION
Patient: Gwendolyn Dietrich    Procedure: Procedure(s):  COLONOSCOPY, WITH POLYPECTOMY       Anesthesia Type:  MAC    Note:  Disposition: Outpatient   Postop Pain Control: Uneventful            Sign Out: Well controlled pain   PONV: No   Neuro/Psych: Uneventful            Sign Out: Acceptable/Baseline neuro status   Airway/Respiratory: Uneventful            Sign Out: Acceptable/Baseline resp. status   CV/Hemodynamics: Uneventful            Sign Out: Acceptable CV status; No obvious hypovolemia; No obvious fluid overload   Other NRE: NONE   DID A NON-ROUTINE EVENT OCCUR? No           Last vitals:  Vitals Value Taken Time   /77 06/08/22 1034   Temp     Pulse 70 06/08/22 1034   Resp     SpO2 99 % 06/08/22 1044   Vitals shown include unvalidated device data.    Electronically Signed By: GUILHERME LUO CRNA  June 8, 2022  10:49 AM

## 2022-06-08 NOTE — ANESTHESIA PREPROCEDURE EVALUATION
Anesthesia Pre-Procedure Evaluation    Patient: Gwendolyn Dietrich   MRN: [id number] : 1980        Procedure : Procedure(s):  COLONOSCOPY          Past Medical History:   Diagnosis Date     Obesity 2013      Past Surgical History:   Procedure Laterality Date     BACK SURGERY  2005     BIOPSY OF SKIN LESION       CHOLECYSTECTOMY  2011     HYSTERECTOMY, PAP STILL INDICATED  2012    CERVIX STILL REMAINS.     HYSTERECTOMY, SUPRACERVICAL LAPAROSCOPIC N/A 2012    Cervix and bilateral ovaries remain. Performed due to menorrhagia, precancerous cells     ORTHOPEDIC SURGERY  2005    back surgery     TONSILLECTOMY       TUBAL LIGATION  2007      Allergies   Allergen Reactions     Seasonal Allergies Anaphylaxis     Birch, Dog and Cats - has Epi-pen for this reason     Codeine GI Disturbance      Social History     Tobacco Use     Smoking status: Former Smoker     Types: Cigarettes     Quit date: 2008     Years since quittin.1     Smokeless tobacco: Never Used   Substance Use Topics     Alcohol use: Yes     Comment: occasionally - a couple times a month      Wt Readings from Last 1 Encounters:   22 113.4 kg (250 lb)        Anesthesia Evaluation   Pt has had prior anesthetic. Type: General.        ROS/MED HX  ENT/Pulmonary:     (+) ARIAS risk factors, snores loudly,     Neurologic:     (+) migraines,     Cardiovascular:  - neg cardiovascular ROS     METS/Exercise Tolerance: 4 - Raking leaves, gardening    Hematologic:  - neg hematologic  ROS     Musculoskeletal:  - neg musculoskeletal ROS     GI/Hepatic:     (+) bowel prep,     Renal/Genitourinary:  - neg Renal ROS     Endo:     (+) Obesity,     Psychiatric/Substance Use:  - neg psychiatric ROS     Infectious Disease:  - neg infectious disease ROS     Malignancy:  - neg malignancy ROS     Other:  - neg other ROS          Physical Exam    Airway      Comment: Pt cannot open her mouth more than 1-2cm.     Mallampati: IV    TM distance: < 3 FB   Neck ROM: full   Mouth opening: < 3 cm    Respiratory Devices and Support         Dental  no notable dental history         Cardiovascular   cardiovascular exam normal          Pulmonary   pulmonary exam normal                OUTSIDE LABS:  CBC:   Lab Results   Component Value Date    WBC 6.2 11/29/2021    WBC 8.6 07/19/2021    HGB 12.5 11/29/2021    HGB 13.4 07/19/2021    HCT 36.0 11/29/2021    HCT 39.3 07/19/2021     11/29/2021     07/19/2021     BMP:   Lab Results   Component Value Date     11/29/2021     07/19/2021    POTASSIUM 4.0 11/29/2021    POTASSIUM 3.6 07/19/2021    CHLORIDE 103 11/29/2021    CHLORIDE 102 07/19/2021    CO2 27 11/29/2021    CO2 25 07/19/2021    BUN 12 11/29/2021    BUN 10 07/19/2021    CR 0.88 11/29/2021    CR 0.90 07/19/2021    GLC 83 11/29/2021    GLC 81 07/19/2021     COAGS: No results found for: PTT, INR, FIBR  POC: No results found for: BGM, HCG, HCGS  HEPATIC:   Lab Results   Component Value Date    ALBUMIN 4.5 11/29/2021    PROTTOTAL 6.9 11/29/2021    ALT 20 11/29/2021    AST 17 11/29/2021    ALKPHOS 41 11/29/2021    BILITOTAL 0.4 11/29/2021     OTHER:   Lab Results   Component Value Date    LACT 1.3 11/16/2020    A1C 4.9 11/29/2021    EVER 9.2 11/29/2021    MAG 1.9 12/22/2021    TSH 3.36 11/29/2021    CRP 4.8 11/29/2021    SED 16 11/29/2021       Anesthesia Plan    ASA Status:  3   NPO Status:  NPO Appropriate    Anesthesia Type: MAC.     - Reason for MAC: straight local not clinically adequate (high BMI over 40)   Induction: Propofol.           Consents    Anesthesia Plan(s) and associated risks, benefits, and realistic alternatives discussed. Questions answered and patient/representative(s) expressed understanding.     - Discussed: Risks, Benefits and Alternatives for BOTH SEDATION and the PROCEDURE were discussed     - Discussed with:  Patient      - Extended Intubation/Ventilatory Support Discussed: No.      - Patient is DNR/DNI  Status: No    Use of blood products discussed: Yes.     - Discussed with: Patient.     - Consented: consented to blood products            Reason for refusal: other.     Postoperative Care       PONV prophylaxis: Ondansetron (or other 5HT-3), Dexamethasone or Solumedrol     Comments:                GUILHERME LUO CRNA

## 2022-06-10 LAB
PATH REPORT.COMMENTS IMP SPEC: NORMAL
PATH REPORT.FINAL DX SPEC: NORMAL
PATH REPORT.RELEVANT HX SPEC: NORMAL
PHOTO IMAGE: NORMAL

## 2022-06-13 ENCOUNTER — ALLIED HEALTH/NURSE VISIT (OUTPATIENT)
Dept: ALLERGY | Facility: OTHER | Age: 42
End: 2022-06-13
Attending: PHYSICIAN ASSISTANT
Payer: COMMERCIAL

## 2022-06-13 DIAGNOSIS — J30.89 PERENNIAL ALLERGIC RHINITIS: Primary | ICD-10-CM

## 2022-06-13 PROCEDURE — 95117 IMMUNOTHERAPY INJECTIONS: CPT

## 2022-06-13 NOTE — PROGRESS NOTES
Prior to injection verified pt identity using pt name and date of birth.    Allergy injection/s given and charted on paper allergy flow sheet.  Patient left AMA, signing form, not staying for the observation period.    Dominik Solorio RN on 6/13/2022 at 8:15 AM

## 2022-06-16 ENCOUNTER — MYC MEDICAL ADVICE (OUTPATIENT)
Dept: FAMILY MEDICINE | Facility: OTHER | Age: 42
End: 2022-06-16
Payer: COMMERCIAL

## 2022-06-16 DIAGNOSIS — M25.50 PAIN IN JOINT: Primary | ICD-10-CM

## 2022-06-16 DIAGNOSIS — L80 VITILIGO: ICD-10-CM

## 2022-06-16 DIAGNOSIS — G60.9 HEREDITARY AND IDIOPATHIC PERIPHERAL NEUROPATHY: ICD-10-CM

## 2022-06-17 NOTE — TELEPHONE ENCOUNTER
After patient's name and date of birth verified the below information was given. Placed patient in schedule.    Carla Mortensen LPN ---- 6/17/2022 12:56 PM

## 2022-06-20 ENCOUNTER — ALLIED HEALTH/NURSE VISIT (OUTPATIENT)
Dept: ALLERGY | Facility: OTHER | Age: 42
End: 2022-06-20
Attending: PHYSICIAN ASSISTANT
Payer: COMMERCIAL

## 2022-06-20 DIAGNOSIS — J30.89 PERENNIAL ALLERGIC RHINITIS: Primary | ICD-10-CM

## 2022-06-20 PROCEDURE — 95117 IMMUNOTHERAPY INJECTIONS: CPT

## 2022-06-20 NOTE — PROGRESS NOTES
Prior to injection verified pt identity using pt name and date of birth.    Allergy injection/s given and charted on paper allergy flow sheet.  Patient left AMA, signing form, not staying for the observation period.    Dominik Solorio RN on 6/20/2022 at 8:53 AM

## 2022-06-22 ENCOUNTER — OFFICE VISIT (OUTPATIENT)
Dept: FAMILY MEDICINE | Facility: OTHER | Age: 42
End: 2022-06-22
Attending: FAMILY MEDICINE
Payer: COMMERCIAL

## 2022-06-22 VITALS
SYSTOLIC BLOOD PRESSURE: 118 MMHG | TEMPERATURE: 97.7 F | DIASTOLIC BLOOD PRESSURE: 68 MMHG | WEIGHT: 251.8 LBS | BODY MASS INDEX: 41.95 KG/M2 | RESPIRATION RATE: 14 BRPM | OXYGEN SATURATION: 98 % | HEART RATE: 70 BPM | HEIGHT: 65 IN

## 2022-06-22 DIAGNOSIS — E66.01 MORBID OBESITY (H): ICD-10-CM

## 2022-06-22 DIAGNOSIS — M25.50 MULTIPLE JOINT PAIN: ICD-10-CM

## 2022-06-22 DIAGNOSIS — L80 VITILIGO: Primary | ICD-10-CM

## 2022-06-22 LAB
CRP SERPL-MCNC: 5 MG/L
ERYTHROCYTE [DISTWIDTH] IN BLOOD BY AUTOMATED COUNT: 12.3 % (ref 10–15)
ERYTHROCYTE [SEDIMENTATION RATE] IN BLOOD BY WESTERGREN METHOD: 8 MM/HR (ref 0–20)
HCT VFR BLD AUTO: 36.9 % (ref 35–47)
HGB BLD-MCNC: 12.6 G/DL (ref 11.7–15.7)
MCH RBC QN AUTO: 29.4 PG (ref 26.5–33)
MCHC RBC AUTO-ENTMCNC: 34.1 G/DL (ref 31.5–36.5)
MCV RBC AUTO: 86 FL (ref 78–100)
PLATELET # BLD AUTO: 236 10E3/UL (ref 150–450)
RBC # BLD AUTO: 4.28 10E6/UL (ref 3.8–5.2)
T3 SERPL-MCNC: 114 NG/DL (ref 85–202)
T4 FREE SERPL-MCNC: 1.01 NG/DL (ref 0.6–1.6)
TSH SERPL DL<=0.005 MIU/L-ACNC: 1.97 MU/L (ref 0.4–4)
WBC # BLD AUTO: 5 10E3/UL (ref 4–11)

## 2022-06-22 PROCEDURE — 85018 HEMOGLOBIN: CPT | Mod: ZL | Performed by: FAMILY MEDICINE

## 2022-06-22 PROCEDURE — 86140 C-REACTIVE PROTEIN: CPT | Mod: ZL | Performed by: FAMILY MEDICINE

## 2022-06-22 PROCEDURE — 84439 ASSAY OF FREE THYROXINE: CPT | Mod: ZL | Performed by: FAMILY MEDICINE

## 2022-06-22 PROCEDURE — 84480 ASSAY TRIIODOTHYRONINE (T3): CPT | Mod: ZL | Performed by: FAMILY MEDICINE

## 2022-06-22 PROCEDURE — 84443 ASSAY THYROID STIM HORMONE: CPT | Mod: ZL | Performed by: FAMILY MEDICINE

## 2022-06-22 PROCEDURE — 85652 RBC SED RATE AUTOMATED: CPT | Mod: ZL | Performed by: FAMILY MEDICINE

## 2022-06-22 PROCEDURE — 36415 COLL VENOUS BLD VENIPUNCTURE: CPT | Mod: ZL | Performed by: FAMILY MEDICINE

## 2022-06-22 PROCEDURE — 99214 OFFICE O/P EST MOD 30 MIN: CPT | Performed by: FAMILY MEDICINE

## 2022-06-22 PROCEDURE — 86376 MICROSOMAL ANTIBODY EACH: CPT | Mod: ZL | Performed by: FAMILY MEDICINE

## 2022-06-22 PROCEDURE — 86200 CCP ANTIBODY: CPT | Mod: ZL | Performed by: FAMILY MEDICINE

## 2022-06-22 ASSESSMENT — PAIN SCALES - GENERAL: PAINLEVEL: MILD PAIN (3)

## 2022-06-22 NOTE — NURSING NOTE
Patient presents to clinic with concerns about a high family history of autoimmune issues. She is experiencing loss of color in skin, and hair, also having joint stiffness. Would like referral to endocrinology.  Peggy Bang LPN ....................  6/22/2022   9:18 AM

## 2022-06-22 NOTE — PROGRESS NOTES
"  Assessment & Plan     (L80) Vitiligo  (primary encounter diagnosis)  Comment: this is moderate, and now on face  Plan: Adult Dermatology Referral        She thought there were no treatment options, but there are several, will consult with derm    (E66.01) Morbid obesity (H)  Comment: labs are normal currently  Plan: TSH, T3, Total, T4, Free, Thyroid peroxidase         antibody        I cannot see on her abs a clear diagnosis for endocrinology consult, but this does not entirely rule out early autoimmune disorders beyond the vitiligo.  With normal labs, I advise watchful waiting and repeat labs in 6 months or so    (M25.50) Multiple joint pain  Comment:    Plan: CBC W PLT No Diff, Sedimentation Rate (ESR),         CRP inflammation, Cyclic Citrullinated Peptide         Antibody IgG                        BMI:   Estimated body mass index is 41.9 kg/m  as calculated from the following:    Height as of this encounter: 1.651 m (5' 5\").    Weight as of this encounter: 114.2 kg (251 lb 12.8 oz).   Weight management plan: Discussed healthy diet and exercise guidelines        Return in about 3 months (around 9/22/2022) for Routine preventive, with me.    Alfonso Alvarez MD  [facility] AND [facility]    Angel Snow is a 41 year old, presenting for the following health issues:  Referral      History of Present Illness       Reason for visit:  Vitiligo    She eats 2-3 servings of fruits and vegetables daily.She consumes 1 sweetened beverage(s) daily.She exercises with enough effort to increase her heart rate 30 to 60 minutes per day.  She exercises with enough effort to increase her heart rate 3 or less days per week.   She is taking medications regularly.      she would like to see endocrinology. A few years ago she noted whit spots on right hand. Since then she has lost coloration in both forearms, and now lower eyelids, along with now getting white eyelashes.  Last year met with an IM doc here about it. Labs " "then were normal, so no follow up advised.    Sister has Hashimoto's, and also got type 1 diabetes from pancreatic insufficiency.  Mom with Crohn's and rheumatoid arthritis.   She has moderate joint pains, inability to lose weight despite diet and exercise.  Alternates between constipation (4-5 days of nothing) then 1-2 days of diarrhea.  No blood.  Symptoms improved after colonoscopy, which showed a serrated adenoma and now on 1 year follow up.          Review of Systems         Objective    /68   Pulse 70   Temp 97.7  F (36.5  C)   Resp 14   Ht 1.651 m (5' 5\")   Wt 114.2 kg (251 lb 12.8 oz)   LMP  (LMP Unknown)   SpO2 98%   BMI 41.90 kg/m    Body mass index is 41.9 kg/m .  Physical Exam  Constitutional:       Appearance: Normal appearance.   Cardiovascular:      Rate and Rhythm: Normal rate and regular rhythm.      Pulses: Normal pulses.   Pulmonary:      Effort: Pulmonary effort is normal. No respiratory distress.      Breath sounds: No stridor.   Skin:     Comments: Loss of pigmentation on hands, to elbows, also under both eyes in lower lids.   Neurological:      Mental Status: She is alert.   Psychiatric:         Mood and Affect: Mood normal.         Behavior: Behavior normal.         Thought Content: Thought content normal.            Results for orders placed or performed in visit on 06/22/22 (from the past 24 hour(s))   CBC W PLT No Diff   Result Value Ref Range    WBC Count 5.0 4.0 - 11.0 10e3/uL    RBC Count 4.28 3.80 - 5.20 10e6/uL    Hemoglobin 12.6 11.7 - 15.7 g/dL    Hematocrit 36.9 35.0 - 47.0 %    MCV 86 78 - 100 fL    MCH 29.4 26.5 - 33.0 pg    MCHC 34.1 31.5 - 36.5 g/dL    RDW 12.3 10.0 - 15.0 %    Platelet Count 236 150 - 450 10e3/uL   Sedimentation Rate (ESR)   Result Value Ref Range    Erythrocyte Sedimentation Rate 8 0 - 20 mm/hr   CRP inflammation   Result Value Ref Range    CRP Inflammation 5.0 <10.0 mg/L   TSH   Result Value Ref Range    TSH 1.97 0.40 - 4.00 mU/L   T4, Free "   Result Value Ref Range    Free T4 1.01 0.60 - 1.60 ng/dL                   .  ..

## 2022-06-23 LAB
CCP AB SER IA-ACNC: 1.3 U/ML
THYROPEROXIDASE AB SERPL-ACNC: 14 IU/ML

## 2022-06-28 ENCOUNTER — ALLIED HEALTH/NURSE VISIT (OUTPATIENT)
Dept: ALLERGY | Facility: OTHER | Age: 42
End: 2022-06-28
Attending: PHYSICIAN ASSISTANT
Payer: COMMERCIAL

## 2022-06-28 DIAGNOSIS — J30.89 PERENNIAL ALLERGIC RHINITIS: Primary | ICD-10-CM

## 2022-06-28 PROCEDURE — 95117 IMMUNOTHERAPY INJECTIONS: CPT

## 2022-06-28 NOTE — PROGRESS NOTES
Prior to injection verified pt identity using pt name and date of birth.    Allergy injection/s given and charted on paper allergy flow sheet.  Patient left AMA, signing form, not staying for the observation period.    Dominik Solorio RN on 6/28/2022 at 8:17 AM

## 2022-06-29 ENCOUNTER — ALLIED HEALTH/NURSE VISIT (OUTPATIENT)
Dept: ALLERGY | Facility: OTHER | Age: 42
End: 2022-06-29
Attending: PHYSICIAN ASSISTANT
Payer: COMMERCIAL

## 2022-06-29 DIAGNOSIS — J30.89 PERENNIAL ALLERGIC RHINITIS: Primary | ICD-10-CM

## 2022-06-29 PROCEDURE — 95165 ANTIGEN THERAPY SERVICES: CPT | Performed by: PHYSICIAN ASSISTANT

## 2022-06-29 NOTE — PROGRESS NOTES
Allergy serum is mixed today at [facility],  into  2  (5 ml)  multi dose vial/vials.    Allergens included were:    Ragweed  0.2 ml of dilution # 1  Pigweed  0.2 ml of dilution # 1  Mugwort 0.2 ml of dilution # 0  Kochia  0.2 ml of dilution # 0  Russian Thistle 0.2 ml of dilution # 1  Alfonso Grass 0.2 ml of dilution # 1  Birch mix 0.2 ml of dilution # 1  Maple Mix 0.2 of dilution # 1  Elm Mix 0.2 ml of dilution # 0  Oak Mix 0.2 ml of dilution # 0  Toribio Mix 0.2 ml of dilution # 0  Pine Mix 0.2 ml of dilution # 0  Eastern [place] 0.2 ml of dilution # 1  Black [place] 0.2 ml of dilution # 1  Aspen 0.2 ml of dilution # 0  Red [place] 0.2 ml of dilution # 0    Alternaria 0.2 ml of dilution # 1  Aspergillus 0.2 ml of dilution # 1  Hormodendrum 0.2 ml of dilution # 1  Helminthosporium 0.2 ml of dilution # 1  Penicillium 0.2 ml of dilution # 1  Epicoccum 0.2 ml of dilution # 1  Fusarium 0.2 ml of dilution # 1  Mucor 0.2 ml of dilution # 0  Grain Smut 0.2 ml of dilution # 0  Grass Smut 0.2 ml of dilution # 0  Cat 0.2 ml of dilution # 1  Dog 0.2 ml of dilution # 1  Feather Mix 0.2 ml of dilution # 0  Dust Mite Mix 0.2 ml of dilution # 1  Horse 0.2 ml of dilution # 0    Dominik Solorio RN on 6/29/2022 at 10:23 AM

## 2022-07-05 ENCOUNTER — ALLIED HEALTH/NURSE VISIT (OUTPATIENT)
Dept: ALLERGY | Facility: OTHER | Age: 42
End: 2022-07-05
Attending: PHYSICIAN ASSISTANT
Payer: COMMERCIAL

## 2022-07-05 DIAGNOSIS — J30.89 PERENNIAL ALLERGIC RHINITIS: Primary | ICD-10-CM

## 2022-07-05 NOTE — PROGRESS NOTES
Prior to injection patient identity verified using name and date of birth.    SVT done on right arm, measuring 7 mm.  Passed   SVT done on left arm, measuring 9 mm.  Passed   Documented on paper flowsheet.     Allergy injection/s given and charted on paper allergy flow sheet.  Patient left AMA, signing form, not staying for the observation period.    Dominik Solorio RN on 7/5/2022 at 8:26 AM

## 2022-07-09 ENCOUNTER — HEALTH MAINTENANCE LETTER (OUTPATIENT)
Age: 42
End: 2022-07-09

## 2022-07-12 ENCOUNTER — ALLIED HEALTH/NURSE VISIT (OUTPATIENT)
Dept: ALLERGY | Facility: OTHER | Age: 42
End: 2022-07-12
Attending: PHYSICIAN ASSISTANT
Payer: COMMERCIAL

## 2022-07-12 DIAGNOSIS — J30.89 PERENNIAL ALLERGIC RHINITIS: Primary | ICD-10-CM

## 2022-07-12 PROCEDURE — 95117 IMMUNOTHERAPY INJECTIONS: CPT

## 2022-07-12 NOTE — PROGRESS NOTES
Prior to injection verified pt identity using pt name and date of birth.    Allergy injection/s given and charted on paper allergy flow sheet.  Patient left AMA, signing form, not staying for the observation period.    Dominik Solorio RN on 7/12/2022 at 3:54 PM

## 2022-07-19 ENCOUNTER — ALLIED HEALTH/NURSE VISIT (OUTPATIENT)
Dept: ALLERGY | Facility: OTHER | Age: 42
End: 2022-07-19
Attending: PHYSICIAN ASSISTANT
Payer: COMMERCIAL

## 2022-07-19 DIAGNOSIS — J30.89 PERENNIAL ALLERGIC RHINITIS: Primary | ICD-10-CM

## 2022-07-19 PROCEDURE — 95117 IMMUNOTHERAPY INJECTIONS: CPT

## 2022-07-19 NOTE — PROGRESS NOTES
Prior to injection verified pt identity using pt name and date of birth.    Allergy injection/s given and charted on paper allergy flow sheet.  Patient left AMA, signing form, not staying for the observation period.    Dominik Solorio RN on 7/19/2022 at 8:06 AM

## 2022-07-26 ENCOUNTER — ALLIED HEALTH/NURSE VISIT (OUTPATIENT)
Dept: ALLERGY | Facility: OTHER | Age: 42
End: 2022-07-26
Attending: PHYSICIAN ASSISTANT
Payer: COMMERCIAL

## 2022-07-26 DIAGNOSIS — J30.89 PERENNIAL ALLERGIC RHINITIS: Primary | ICD-10-CM

## 2022-07-26 PROCEDURE — 95117 IMMUNOTHERAPY INJECTIONS: CPT

## 2022-07-26 NOTE — PROGRESS NOTES
Prior to injection verified pt identity using pt name and date of birth.    Allergy injection/s given and charted on paper allergy flow sheet.  Patient left AMA, signing form, not staying for the observation period.    Dominik Solorio RN on 7/26/2022 at 8:09 AM

## 2022-08-02 ENCOUNTER — ALLIED HEALTH/NURSE VISIT (OUTPATIENT)
Dept: ALLERGY | Facility: OTHER | Age: 42
End: 2022-08-02
Attending: PHYSICIAN ASSISTANT
Payer: COMMERCIAL

## 2022-08-02 DIAGNOSIS — J30.89 PERENNIAL ALLERGIC RHINITIS: Primary | ICD-10-CM

## 2022-08-02 PROCEDURE — 95117 IMMUNOTHERAPY INJECTIONS: CPT

## 2022-08-02 NOTE — PROGRESS NOTES
Prior to injection verified pt identity using pt name and date of birth.    Allergy injection/s given and charted on paper allergy flow sheet.  Patient left AMA, signing form, not staying for the observation period.    Dominik Solorio RN on 8/2/2022 at 1:16 PM

## 2022-08-09 ENCOUNTER — ALLIED HEALTH/NURSE VISIT (OUTPATIENT)
Dept: ALLERGY | Facility: OTHER | Age: 42
End: 2022-08-09
Attending: PHYSICIAN ASSISTANT
Payer: COMMERCIAL

## 2022-08-09 DIAGNOSIS — J30.89 PERENNIAL ALLERGIC RHINITIS: Primary | ICD-10-CM

## 2022-08-09 PROCEDURE — 95117 IMMUNOTHERAPY INJECTIONS: CPT

## 2022-08-09 NOTE — PROGRESS NOTES
Prior to injection verified pt identity using pt name and date of birth.    Allergy injection/s given and charted on paper allergy flow sheet.  Patient left AMA, signing form, not staying for the observation period.    Dominik Solorio RN on 8/9/2022 at 1:20 PM

## 2022-08-16 ENCOUNTER — ALLIED HEALTH/NURSE VISIT (OUTPATIENT)
Dept: ALLERGY | Facility: OTHER | Age: 42
End: 2022-08-16
Attending: PHYSICIAN ASSISTANT
Payer: COMMERCIAL

## 2022-08-16 DIAGNOSIS — J30.89 PERENNIAL ALLERGIC RHINITIS: Primary | ICD-10-CM

## 2022-08-16 PROCEDURE — 95117 IMMUNOTHERAPY INJECTIONS: CPT

## 2022-08-16 NOTE — PROGRESS NOTES
Prior to injection verified pt identity using pt name and date of birth.    Allergy injection/s given and charted on paper allergy flow sheet.  Patient left AMA, signing form, not staying for the observation period.    Dominik Solorio RN on 8/16/2022 at 8:07 AM

## 2022-08-23 ENCOUNTER — ALLIED HEALTH/NURSE VISIT (OUTPATIENT)
Dept: ALLERGY | Facility: OTHER | Age: 42
End: 2022-08-23
Attending: PHYSICIAN ASSISTANT
Payer: COMMERCIAL

## 2022-08-23 DIAGNOSIS — J30.89 PERENNIAL ALLERGIC RHINITIS: Primary | ICD-10-CM

## 2022-08-23 PROCEDURE — 95117 IMMUNOTHERAPY INJECTIONS: CPT

## 2022-08-23 NOTE — PROGRESS NOTES
Prior to injection verified pt identity using pt name and date of birth.    Allergy injection/s given and charted on paper allergy flow sheet.  Patient left AMA, signing form, not staying for the observation period.    Dominik Solorio RN on 8/23/2022 at 8:15 AM

## 2022-08-30 ENCOUNTER — ALLIED HEALTH/NURSE VISIT (OUTPATIENT)
Dept: ALLERGY | Facility: OTHER | Age: 42
End: 2022-08-30
Attending: PHYSICIAN ASSISTANT
Payer: COMMERCIAL

## 2022-08-30 DIAGNOSIS — J30.89 PERENNIAL ALLERGIC RHINITIS: Primary | ICD-10-CM

## 2022-08-30 PROCEDURE — 95117 IMMUNOTHERAPY INJECTIONS: CPT

## 2022-08-30 NOTE — PROGRESS NOTES
Prior to injection verified pt identity using pt name and date of birth.    Allergy injection/s given and charted on paper allergy flow sheet.  Patient left AMA, signing form, not staying for the observation period.    Ena Dejesus RN on 8/30/2022 at 8:09 AM

## 2022-08-31 ENCOUNTER — ALLIED HEALTH/NURSE VISIT (OUTPATIENT)
Dept: ALLERGY | Facility: OTHER | Age: 42
End: 2022-08-31
Attending: PHYSICIAN ASSISTANT
Payer: COMMERCIAL

## 2022-08-31 DIAGNOSIS — J30.89 PERENNIAL ALLERGIC RHINITIS: Primary | ICD-10-CM

## 2022-08-31 PROCEDURE — 95165 ANTIGEN THERAPY SERVICES: CPT | Performed by: PHYSICIAN ASSISTANT

## 2022-08-31 NOTE — PROGRESS NOTES
Allergy serum is mixed today at [facility],  into  2  (5 ml)  multi dose vial/vials.    Allergens included were:    Ragweed  0.2 ml of dilution # 1  Pigweed  0.2 ml of dilution # 1  Mugwort 0.2 ml of dilution # 0  Kochia  0.2 ml of dilution # 0  Russian Thistle 0.2 ml of dilution # 1  Al[place] Grass 0.2 ml of dilution # 1  Birch mix 0.2 ml of dilution # 1  Maple Mix 0.2 of dilution # 1  Elm Mix 0.2 ml of dilution # 0  Oak Mix 0.2 ml of dilution # 0  Toribio Mix 0.2 ml of dilution # 0  Pine Mix 0.2 ml of dilution # 0  Eastern [place] 0.2 ml of dilution # 1  Black [place] 0.2 ml of dilution # 1  Aspen 0.2 ml of dilution # 0  Red [place] 0.2 ml of dilution # 0    Alternaria 0.2 ml of dilution # 1  Aspergillus 0.2 ml of dilution # 1  Hormodendrum 0.2 ml of dilution # 1  Helminthosporium 0.2 ml of dilution # 1  Penicillium 0.2 ml of dilution # 1  Epicoccum 0.2 ml of dilution # 1  Fusarium 0.2 ml of dilution # 1  Mucor 0.2 ml of dilution # 0  Grain Smut 0.2 ml of dilution # 0  Grass Smut 0.2 ml of dilution # 0  Cat 0.2 ml of dilution # 1  Dog 0.2 ml of dilution # 1  Feather Mix 0.2 ml of dilution # 0  Dust Mite Mix 0.2 ml of dilution # 1  Horse 0.2 ml of dilution # 0    Dominik Solorio RN on 8/31/2022 at 11:44 AM

## 2022-09-04 ENCOUNTER — HEALTH MAINTENANCE LETTER (OUTPATIENT)
Age: 42
End: 2022-09-04

## 2022-09-07 ENCOUNTER — ALLIED HEALTH/NURSE VISIT (OUTPATIENT)
Dept: ALLERGY | Facility: OTHER | Age: 42
End: 2022-09-07
Attending: PHYSICIAN ASSISTANT
Payer: COMMERCIAL

## 2022-09-07 DIAGNOSIS — J30.89 PERENNIAL ALLERGIC RHINITIS: Primary | ICD-10-CM

## 2022-09-07 PROCEDURE — 95117 IMMUNOTHERAPY INJECTIONS: CPT

## 2022-09-07 NOTE — PROGRESS NOTES
Prior to injection patient identity verified using name and date of birth.    SVT done on right arm, measuring 7 mm.  Passed  SVT done on left arm, measuring 8 mm.  Passed    Documented on paper flowsheet.     Allergy injection/s given and charted on paper allergy flow sheet.  Patient left AMA, signing form, not staying for the observation period.    Dominik Solorio RN on 9/7/2022 at 8:27 AM

## 2022-09-14 ENCOUNTER — ALLIED HEALTH/NURSE VISIT (OUTPATIENT)
Dept: ALLERGY | Facility: OTHER | Age: 42
End: 2022-09-14
Attending: PHYSICIAN ASSISTANT
Payer: COMMERCIAL

## 2022-09-14 DIAGNOSIS — J30.89 PERENNIAL ALLERGIC RHINITIS: Primary | ICD-10-CM

## 2022-09-14 PROCEDURE — 95117 IMMUNOTHERAPY INJECTIONS: CPT

## 2022-09-14 NOTE — PROGRESS NOTES
Prior to injection verified pt identity using pt name and date of birth.    Allergy injection/s given and charted on paper allergy flow sheet.  Patient left AMA, signing form, not staying for the observation period.    Ena Dejesus RN on 9/14/2022 at 8:07 AM

## 2022-09-21 ENCOUNTER — ALLIED HEALTH/NURSE VISIT (OUTPATIENT)
Dept: ALLERGY | Facility: OTHER | Age: 42
End: 2022-09-21
Attending: PHYSICIAN ASSISTANT
Payer: COMMERCIAL

## 2022-09-21 DIAGNOSIS — J30.89 PERENNIAL ALLERGIC RHINITIS: Primary | ICD-10-CM

## 2022-09-21 PROCEDURE — 95117 IMMUNOTHERAPY INJECTIONS: CPT

## 2022-09-21 NOTE — PROGRESS NOTES
Prior to injection verified pt identity using pt name and date of birth.    Allergy injection/s given and charted on paper allergy flow sheet.  Patient left AMA, signing form, not staying for the observation period.    Ena Dejesus RN on 9/21/2022 at 8:24 AM

## 2022-09-28 ENCOUNTER — ALLIED HEALTH/NURSE VISIT (OUTPATIENT)
Dept: ALLERGY | Facility: OTHER | Age: 42
End: 2022-09-28
Attending: PHYSICIAN ASSISTANT
Payer: COMMERCIAL

## 2022-09-28 DIAGNOSIS — J30.89 PERENNIAL ALLERGIC RHINITIS: Primary | ICD-10-CM

## 2022-09-28 PROCEDURE — 95117 IMMUNOTHERAPY INJECTIONS: CPT

## 2022-09-28 NOTE — PROGRESS NOTES
Prior to injection verified pt identity using pt name and date of birth.    Allergy injection/s given and charted on paper allergy flow sheet.  Patient left AMA, signing form, not staying for the observation period.    Dominik Solorio RN on 9/28/2022 at 8:08 AM

## 2022-10-05 ENCOUNTER — ALLIED HEALTH/NURSE VISIT (OUTPATIENT)
Dept: ALLERGY | Facility: OTHER | Age: 42
End: 2022-10-05
Attending: PHYSICIAN ASSISTANT
Payer: COMMERCIAL

## 2022-10-05 DIAGNOSIS — J30.89 PERENNIAL ALLERGIC RHINITIS: Primary | ICD-10-CM

## 2022-10-05 PROCEDURE — 95117 IMMUNOTHERAPY INJECTIONS: CPT

## 2022-10-05 NOTE — PROGRESS NOTES
Prior to injection verified pt identity using pt name and date of birth.    Allergy injection/s given and charted on paper allergy flow sheet.  Patient left AMA, signing form, not staying for the observation period.    Dominik Solorio RN on 10/5/2022 at 8:11 AM

## 2022-10-12 ENCOUNTER — ALLIED HEALTH/NURSE VISIT (OUTPATIENT)
Dept: ALLERGY | Facility: OTHER | Age: 42
End: 2022-10-12
Attending: PHYSICIAN ASSISTANT
Payer: COMMERCIAL

## 2022-10-12 DIAGNOSIS — J30.89 PERENNIAL ALLERGIC RHINITIS: Primary | ICD-10-CM

## 2022-10-12 PROCEDURE — 95117 IMMUNOTHERAPY INJECTIONS: CPT

## 2022-10-12 NOTE — PROGRESS NOTES
Prior to injection verified pt identity using pt name and date of birth.    Allergy injection/s given and charted on paper allergy flow sheet.  Patient left AMA, signing form, not staying for the observation period.    Ena Dejesus RN on 10/12/2022 at 8:11 AM

## 2022-10-19 ENCOUNTER — ALLIED HEALTH/NURSE VISIT (OUTPATIENT)
Dept: ALLERGY | Facility: OTHER | Age: 42
End: 2022-10-19
Attending: PHYSICIAN ASSISTANT
Payer: COMMERCIAL

## 2022-10-19 DIAGNOSIS — J30.89 PERENNIAL ALLERGIC RHINITIS: Primary | ICD-10-CM

## 2022-10-19 PROCEDURE — 95117 IMMUNOTHERAPY INJECTIONS: CPT

## 2022-10-19 NOTE — PROGRESS NOTES
Prior to injection verified pt identity using pt name and date of birth.    Allergy injection/s given and charted on paper allergy flow sheet.  Patient left AMA, signing form, not staying for the observation period.    Dominik Solorio RN on 10/19/2022 at 8:14 AM

## 2022-10-26 ENCOUNTER — ALLIED HEALTH/NURSE VISIT (OUTPATIENT)
Dept: ALLERGY | Facility: OTHER | Age: 42
End: 2022-10-26
Attending: PHYSICIAN ASSISTANT
Payer: COMMERCIAL

## 2022-10-26 DIAGNOSIS — J30.89 PERENNIAL ALLERGIC RHINITIS: Primary | ICD-10-CM

## 2022-10-26 PROCEDURE — 95117 IMMUNOTHERAPY INJECTIONS: CPT

## 2022-10-26 NOTE — PROGRESS NOTES
Prior to injection verified pt identity using pt name and date of birth.    Allergy injection/s given and charted on paper allergy flow sheet.  Patient left AMA, signing form, not staying for the observation period.    Ena Dejesus RN on 10/26/2022 at 8:07 AM

## 2022-11-03 ENCOUNTER — ALLIED HEALTH/NURSE VISIT (OUTPATIENT)
Dept: ALLERGY | Facility: OTHER | Age: 42
End: 2022-11-03
Attending: PHYSICIAN ASSISTANT
Payer: COMMERCIAL

## 2022-11-03 DIAGNOSIS — J30.89 PERENNIAL ALLERGIC RHINITIS: Primary | ICD-10-CM

## 2022-11-03 PROCEDURE — 95117 IMMUNOTHERAPY INJECTIONS: CPT

## 2022-11-03 NOTE — PROGRESS NOTES
Prior to injection verified pt identity using pt name and date of birth.    Allergy injection/s given and charted on paper allergy flow sheet.  Patient left AMA, signing form, not staying for the observation period.    Ena Dejesus RN on 11/3/2022 at 8:10 AM

## 2022-11-09 ENCOUNTER — ALLIED HEALTH/NURSE VISIT (OUTPATIENT)
Dept: ALLERGY | Facility: OTHER | Age: 42
End: 2022-11-09
Attending: PHYSICIAN ASSISTANT
Payer: COMMERCIAL

## 2022-11-09 DIAGNOSIS — J30.89 PERENNIAL ALLERGIC RHINITIS: Primary | ICD-10-CM

## 2022-11-09 PROCEDURE — 95165 ANTIGEN THERAPY SERVICES: CPT | Performed by: PHYSICIAN ASSISTANT

## 2022-11-09 PROCEDURE — 95117 IMMUNOTHERAPY INJECTIONS: CPT

## 2022-11-09 NOTE — PROGRESS NOTES
Allergy serum is mixed today at [facility],  into  2  (5 ml)  multi dose vial/vials.    Allergens included were:    Ragweed  0.2 ml of dilution # 1  Pigweed  0.2 ml of dilution # 1  Mugwort 0.2 ml of dilution # 0  Kochia  0.2 ml of dilution # 0  Russian Thistle 0.2 ml of dilution # 1  Alfonso Grass 0.2 ml of dilution # 1  Birch mix 0.2 ml of dilution # 1  Maple Mix 0.2 of dilution # 1  Elm Mix 0.2 ml of dilution # 0  Oak Mix 0.2 ml of dilution # 0  Toribio Mix 0.2 ml of dilution # 0  Pine Mix 0.2 ml of dilution # 0  Eastern [place] 0.2 ml of dilution # 1  Black [place] 0.2 ml of dilution # 1  Aspen 0.2 ml of dilution # 0  Red [place] 0.2 ml of dilution # 0    Alternaria 0.2 ml of dilution # 1  Aspergillus 0.2 ml of dilution # 1  Hormodendrum 0.2 ml of dilution # 1  Helminthosporium 0.2 ml of dilution # 1  Penicillium 0.2 ml of dilution # 1  Epicoccum 0.2 ml of dilution # 1  Fusarium 0.2 ml of dilution # 1  Mucor 0.2 ml of dilution # 0  Grain Smut 0.2 ml of dilution # 0  Grass Smut 0.2 ml of dilution # 0  Cat 0.2 ml of dilution # 1  Dog 0.2 ml of dilution # 1  Feather Mix 0.2 ml of dilution # 0  Dust Mite Mix 0.2 ml of dilution # 1  Horse 0.2 ml of dilution # 0    Dominik Solorio RN on 11/9/2022 at 8:22 AM

## 2022-11-09 NOTE — PROGRESS NOTES
Prior to injection patient identity verified using name and date of birth.    SVT done on right arm, measuring 7 mm.  Passed   SVT done on left arm, measuring 9 mm.  Passed     Documented on paper flowsheet.     Allergy injection/s given and charted on paper allergy flow sheet.  Patient left AMA, signing form, not staying for the observation period.    Dominik Solorio RN on 11/9/2022 at 8:21 AM

## 2022-11-16 ENCOUNTER — ALLIED HEALTH/NURSE VISIT (OUTPATIENT)
Dept: ALLERGY | Facility: OTHER | Age: 42
End: 2022-11-16
Attending: PHYSICIAN ASSISTANT
Payer: COMMERCIAL

## 2022-11-16 DIAGNOSIS — J30.89 PERENNIAL ALLERGIC RHINITIS: Primary | ICD-10-CM

## 2022-11-16 PROCEDURE — 95117 IMMUNOTHERAPY INJECTIONS: CPT

## 2022-11-16 NOTE — PROGRESS NOTES
Prior to injection verified pt identity using pt name and date of birth.    Allergy injection/s given and charted on paper allergy flow sheet.  Patient left AMA, signing form, not staying for the observation period.    Ena Dejesus RN on 11/16/2022 at 12:59 PM

## 2022-11-23 ENCOUNTER — ALLIED HEALTH/NURSE VISIT (OUTPATIENT)
Dept: ALLERGY | Facility: OTHER | Age: 42
End: 2022-11-23
Attending: PHYSICIAN ASSISTANT
Payer: COMMERCIAL

## 2022-11-23 DIAGNOSIS — J30.89 PERENNIAL ALLERGIC RHINITIS: Primary | ICD-10-CM

## 2022-11-23 PROCEDURE — 95117 IMMUNOTHERAPY INJECTIONS: CPT

## 2022-11-23 NOTE — PROGRESS NOTES
Prior to injection verified pt identity using pt name and date of birth.    Allergy injection/s given and charted on paper allergy flow sheet.  Patient left AMA, signing form, not staying for the observation period.    Ena Dejesus RN on 11/23/2022 at 8:20 AM

## 2022-12-01 ENCOUNTER — ALLIED HEALTH/NURSE VISIT (OUTPATIENT)
Dept: ALLERGY | Facility: OTHER | Age: 42
End: 2022-12-01
Attending: PHYSICIAN ASSISTANT
Payer: COMMERCIAL

## 2022-12-01 DIAGNOSIS — J30.89 PERENNIAL ALLERGIC RHINITIS: Primary | ICD-10-CM

## 2022-12-01 PROCEDURE — 95117 IMMUNOTHERAPY INJECTIONS: CPT

## 2022-12-01 NOTE — PROGRESS NOTES
Prior to injection verified pt identity using pt name and date of birth.    Allergy injection/s given and charted on paper allergy flow sheet.  Patient left AMA, signing form, not staying for the observation period.    Ena Dejesus RN on 12/1/2022 at 8:14 AM

## 2022-12-07 ENCOUNTER — ALLIED HEALTH/NURSE VISIT (OUTPATIENT)
Dept: ALLERGY | Facility: OTHER | Age: 42
End: 2022-12-07
Attending: PHYSICIAN ASSISTANT
Payer: COMMERCIAL

## 2022-12-07 DIAGNOSIS — J30.89 PERENNIAL ALLERGIC RHINITIS: Primary | ICD-10-CM

## 2022-12-07 PROCEDURE — 95117 IMMUNOTHERAPY INJECTIONS: CPT

## 2022-12-07 NOTE — PROGRESS NOTES
Prior to injection verified pt identity using pt name and date of birth.    Allergy injection/s given and charted on paper allergy flow sheet.  Patient left AMA, signing form, not staying for the observation period.    Dominik Solorio RN on 12/7/2022 at 8:15 AM

## 2022-12-29 ENCOUNTER — ALLIED HEALTH/NURSE VISIT (OUTPATIENT)
Dept: ALLERGY | Facility: OTHER | Age: 42
End: 2022-12-29
Attending: PHYSICIAN ASSISTANT
Payer: COMMERCIAL

## 2022-12-29 DIAGNOSIS — J30.89 PERENNIAL ALLERGIC RHINITIS: Primary | ICD-10-CM

## 2022-12-29 PROCEDURE — 95117 IMMUNOTHERAPY INJECTIONS: CPT

## 2022-12-29 NOTE — PROGRESS NOTES
Prior to injection verified pt identity using pt name and date of birth.    Allergy injection/s given and charted on paper allergy flow sheet.  Patient left AMA, signing form, not staying for the observation period.    Dominik Solorio RN on 12/29/2022 at 8:09 AM

## 2023-01-09 ENCOUNTER — ALLIED HEALTH/NURSE VISIT (OUTPATIENT)
Dept: ALLERGY | Facility: OTHER | Age: 43
End: 2023-01-09
Attending: PHYSICIAN ASSISTANT
Payer: COMMERCIAL

## 2023-01-09 DIAGNOSIS — J30.89 PERENNIAL ALLERGIC RHINITIS: Primary | ICD-10-CM

## 2023-01-09 PROCEDURE — 95117 IMMUNOTHERAPY INJECTIONS: CPT

## 2023-01-09 NOTE — PROGRESS NOTES
Prior to injection verified pt identity using pt name and date of birth.    Allergy injection/s given and charted on paper allergy flow sheet.  Patient left AMA, signing form, not staying for the observation period.    Ena Dejesus RN on 1/9/2023 at 8:11 AM

## 2023-01-23 ENCOUNTER — ALLIED HEALTH/NURSE VISIT (OUTPATIENT)
Dept: ALLERGY | Facility: OTHER | Age: 43
End: 2023-01-23
Attending: PHYSICIAN ASSISTANT
Payer: COMMERCIAL

## 2023-01-23 DIAGNOSIS — J30.89 PERENNIAL ALLERGIC RHINITIS: Primary | ICD-10-CM

## 2023-01-23 PROCEDURE — 95117 IMMUNOTHERAPY INJECTIONS: CPT

## 2023-01-23 NOTE — PROGRESS NOTES
Prior to injection verified pt identity using pt name and date of birth.    Allergy injection/s given and charted on paper allergy flow sheet.  Patient left AMA, signing form, not staying for the observation period.    Dominik Solorio RN on 1/23/2023 at 8:14 AM

## 2023-01-25 ENCOUNTER — ALLIED HEALTH/NURSE VISIT (OUTPATIENT)
Dept: ALLERGY | Facility: OTHER | Age: 43
End: 2023-01-25
Attending: PHYSICIAN ASSISTANT
Payer: COMMERCIAL

## 2023-01-25 DIAGNOSIS — J30.89 PERENNIAL ALLERGIC RHINITIS: Primary | ICD-10-CM

## 2023-01-25 PROCEDURE — 95165 ANTIGEN THERAPY SERVICES: CPT | Performed by: PHYSICIAN ASSISTANT

## 2023-01-25 NOTE — PROGRESS NOTES
Allergy serum is mixed today at [facility],  into  2  (5 ml)  multi dose vial/vials.    Allergens included were:    Ragweed  0.2 ml of dilution # 1  Pigweed  0.2 ml of dilution # 1  Mugwort 0.2 ml of dilution # 0  Kochia  0.2 ml of dilution # 0  Russian Thistle 0.2 ml of dilution # 1  Alfonso Grass 0.2 ml of dilution # 1  Birch mix 0.2 ml of dilution # 1  Maple Mix 0.2 of dilution # 1  Elm Mix 0.2 ml of dilution # 0  Oak Mix 0.2 ml of dilution # 0  Toribio Mix 0.2 ml of dilution # 0  Pine Mix 0.2 ml of dilution # 0  Eastern [place] 0.2 ml of dilution # 1  Black [place] 0.2 ml of dilution # 1  Aspen 0.2 ml of dilution # 0  Red [place] 0.2 ml of dilution # 0    Alternaria 0.2 ml of dilution # 1  Aspergillus 0.2 ml of dilution # 1  Hormodendrum 0.2 ml of dilution # 1  Helminthosporium 0.2 ml of dilution # 1  Penicillium 0.2 ml of dilution # 1  Epicoccum 0.2 ml of dilution # 1  Fusarium 0.2 ml of dilution # 1  Mucor 0.2 ml of dilution # 0  Grain Smut 0.2 ml of dilution # 0  Grass Smut 0.2 ml of dilution # 0  Cat 0.2 ml of dilution # 1  Dog 0.2 ml of dilution # 1  Feather Mix 0.2 ml of dilution # 0  Dust Mite Mix 0.2 ml of dilution # 1  Horse 0.2 ml of dilution # 0    Dominik Solorio RN on 1/25/2023 at 9:52 AM

## 2023-02-06 ENCOUNTER — ALLIED HEALTH/NURSE VISIT (OUTPATIENT)
Dept: ALLERGY | Facility: OTHER | Age: 43
End: 2023-02-06
Attending: PHYSICIAN ASSISTANT
Payer: COMMERCIAL

## 2023-02-06 DIAGNOSIS — J30.89 PERENNIAL ALLERGIC RHINITIS: Primary | ICD-10-CM

## 2023-02-06 PROCEDURE — 95117 IMMUNOTHERAPY INJECTIONS: CPT

## 2023-02-06 NOTE — PROGRESS NOTES
Prior to injection patient identity verified using name and date of birth.    SVT done on right arm, measuring 7 mm.  Passed   SVT done on left arm, measuring 7 mm.  Passed  Documented on paper flowsheet.     Allergy injection/s given and charted on paper allergy flow sheet.  Patient left AMA, signing form, not staying for the observation period.    Ena Dejesus RN on 2/6/2023 at 8:24 AM

## 2023-02-20 ENCOUNTER — ALLIED HEALTH/NURSE VISIT (OUTPATIENT)
Dept: ALLERGY | Facility: OTHER | Age: 43
End: 2023-02-20
Attending: PHYSICIAN ASSISTANT
Payer: COMMERCIAL

## 2023-02-20 DIAGNOSIS — J30.89 PERENNIAL ALLERGIC RHINITIS: Primary | ICD-10-CM

## 2023-02-20 PROCEDURE — 95117 IMMUNOTHERAPY INJECTIONS: CPT

## 2023-02-20 NOTE — PROGRESS NOTES
Prior to injection verified pt identity using pt name and date of birth.    Allergy injection/s given and charted on paper allergy flow sheet.  Patient left AMA, signing form, not staying for the observation period.    Dominik Solorio RN on 2/20/2023 at 8:11 AM

## 2023-02-21 ENCOUNTER — OFFICE VISIT (OUTPATIENT)
Dept: FAMILY MEDICINE | Facility: OTHER | Age: 43
End: 2023-02-21
Attending: PHYSICIAN ASSISTANT
Payer: COMMERCIAL

## 2023-02-21 VITALS
DIASTOLIC BLOOD PRESSURE: 82 MMHG | RESPIRATION RATE: 14 BRPM | OXYGEN SATURATION: 98 % | TEMPERATURE: 98.9 F | SYSTOLIC BLOOD PRESSURE: 136 MMHG | HEART RATE: 103 BPM | BODY MASS INDEX: 42.33 KG/M2 | WEIGHT: 254.4 LBS

## 2023-02-21 DIAGNOSIS — R10.9 RIGHT FLANK PAIN: Primary | ICD-10-CM

## 2023-02-21 LAB
ALBUMIN SERPL BCG-MCNC: 4.5 G/DL (ref 3.5–5.2)
ALBUMIN UR-MCNC: NEGATIVE MG/DL
ALP SERPL-CCNC: 68 U/L (ref 35–104)
ALT SERPL W P-5'-P-CCNC: 34 U/L (ref 10–35)
ANION GAP SERPL CALCULATED.3IONS-SCNC: 9 MMOL/L (ref 7–15)
APPEARANCE UR: CLEAR
AST SERPL W P-5'-P-CCNC: 27 U/L (ref 10–35)
BACTERIA #/AREA URNS HPF: ABNORMAL /HPF
BASOPHILS # BLD AUTO: 0.1 10E3/UL (ref 0–0.2)
BASOPHILS NFR BLD AUTO: 1 %
BILIRUB SERPL-MCNC: 0.5 MG/DL
BILIRUB UR QL STRIP: NEGATIVE
BUN SERPL-MCNC: 9 MG/DL (ref 6–20)
CALCIUM SERPL-MCNC: 9.2 MG/DL (ref 8.6–10)
CHLORIDE SERPL-SCNC: 104 MMOL/L (ref 98–107)
COLOR UR AUTO: YELLOW
CREAT SERPL-MCNC: 0.8 MG/DL (ref 0.51–0.95)
DEPRECATED HCO3 PLAS-SCNC: 26 MMOL/L (ref 22–29)
EOSINOPHIL # BLD AUTO: 0.1 10E3/UL (ref 0–0.7)
EOSINOPHIL NFR BLD AUTO: 2 %
ERYTHROCYTE [DISTWIDTH] IN BLOOD BY AUTOMATED COUNT: 12.4 % (ref 10–15)
GFR SERPL CREATININE-BSD FRML MDRD: >90 ML/MIN/1.73M2
GLUCOSE SERPL-MCNC: 95 MG/DL (ref 70–99)
GLUCOSE UR STRIP-MCNC: NEGATIVE MG/DL
HCT VFR BLD AUTO: 38.7 % (ref 35–47)
HGB BLD-MCNC: 13.3 G/DL (ref 11.7–15.7)
HGB UR QL STRIP: NEGATIVE
IMM GRANULOCYTES # BLD: 0 10E3/UL
IMM GRANULOCYTES NFR BLD: 0 %
KETONES UR STRIP-MCNC: NEGATIVE MG/DL
LEUKOCYTE ESTERASE UR QL STRIP: NEGATIVE
LYMPHOCYTES # BLD AUTO: 1.8 10E3/UL (ref 0.8–5.3)
LYMPHOCYTES NFR BLD AUTO: 24 %
MCH RBC QN AUTO: 28.9 PG (ref 26.5–33)
MCHC RBC AUTO-ENTMCNC: 34.4 G/DL (ref 31.5–36.5)
MCV RBC AUTO: 84 FL (ref 78–100)
MONOCYTES # BLD AUTO: 0.7 10E3/UL (ref 0–1.3)
MONOCYTES NFR BLD AUTO: 9 %
NEUTROPHILS # BLD AUTO: 4.8 10E3/UL (ref 1.6–8.3)
NEUTROPHILS NFR BLD AUTO: 64 %
NITRATE UR QL: NEGATIVE
NRBC # BLD AUTO: 0 10E3/UL
NRBC BLD AUTO-RTO: 0 /100
PH UR STRIP: 5.5 [PH] (ref 5–9)
PLATELET # BLD AUTO: 225 10E3/UL (ref 150–450)
POTASSIUM SERPL-SCNC: 4.1 MMOL/L (ref 3.4–5.3)
PROT SERPL-MCNC: 7.1 G/DL (ref 6.4–8.3)
RBC # BLD AUTO: 4.6 10E6/UL (ref 3.8–5.2)
RBC URINE: 1 /HPF
SODIUM SERPL-SCNC: 139 MMOL/L (ref 136–145)
SP GR UR STRIP: 1.01 (ref 1–1.03)
UROBILINOGEN UR STRIP-MCNC: NORMAL MG/DL
WBC # BLD AUTO: 7.5 10E3/UL (ref 4–11)
WBC URINE: 1 /HPF

## 2023-02-21 PROCEDURE — 85025 COMPLETE CBC W/AUTO DIFF WBC: CPT | Mod: ZL | Performed by: PHYSICIAN ASSISTANT

## 2023-02-21 PROCEDURE — 36415 COLL VENOUS BLD VENIPUNCTURE: CPT | Mod: ZL | Performed by: PHYSICIAN ASSISTANT

## 2023-02-21 PROCEDURE — 81001 URINALYSIS AUTO W/SCOPE: CPT | Mod: ZL | Performed by: PHYSICIAN ASSISTANT

## 2023-02-21 PROCEDURE — 80053 COMPREHEN METABOLIC PANEL: CPT | Mod: ZL | Performed by: PHYSICIAN ASSISTANT

## 2023-02-21 PROCEDURE — 99213 OFFICE O/P EST LOW 20 MIN: CPT | Performed by: PHYSICIAN ASSISTANT

## 2023-02-21 ASSESSMENT — PAIN SCALES - GENERAL: PAINLEVEL: SEVERE PAIN (6)

## 2023-02-21 NOTE — NURSING NOTE
Patient presents to clinic with right ovary pain but the pain is right flank pain This has been going on for the last month.  Peggy Bang LPN ....................  2/21/2023   9:37 AM

## 2023-02-21 NOTE — PROGRESS NOTES
Assessment & Plan     1. Right flank pain  Differential includes musculoskeletal cause, UTI, pyelonephritis, renal calculi, vaginal infection, ovarian cyst, ovarian torsion, constipation, GI infection, etc. Vitals stable, right flank pain and diffuse abdominal discomfort on exam. Labs and UA unremarkable. Patient interested in pursuing additional evaluation with imaging given her symptoms are now persistent and worsening, CT ordered. Continue with symptomatic management in the meantime. Will adjust treatment plan if indicated based on imagine results.   - CBC and Differential; Future  - Comprehensive Metabolic Panel; Future  - UA reflex to Microscopic and Culture  - CBC and Differential  - Comprehensive Metabolic Panel  - CT Abdomen Pelvis w/o Contrast; Future      Return if symptoms worsen or fail to improve.    Ilana Brooks PA-C  [facility]   Gwendolyn is a 42 year old, presenting for the following health issues:  Pain      History of Present Illness       Reason for visit:  Pain  Symptom onset:  More than a month  Symptom intensity:  Moderate  Symptom progression:  Worsening  Had these symptoms before:  No  What makes it worse:  No  What makes it better:  No    She eats 2-3 servings of fruits and vegetables daily.She consumes 1 sweetened beverage(s) daily.She exercises with enough effort to increase her heart rate 10 to 19 minutes per day.  She exercises with enough effort to increase her heart rate 3 or less days per week.   She is taking medications regularly.     Here for evaluation of right-sided flank pain that has been present for approximately 1 month.  Was initially on and off but over the past 5 days it has been consistent.  Pain seems to be around right flank region radiating towards mid lumbar region.  Reports pain does not feel like muscle pain.  No recent injury to the area.  Has noticed some urinary frequency but reports she has increased her hydration  recently.  Has been managing with ibuprofen with minimal improvement.  History of PCOS, has concerns for possible ovarian cyst.  Status post supracervical hysterectomy, is due for Pap smear which patient declines today.  Has also been noticing some night sweats, sometimes associated with her wood-burning heat but other times she seems to be sweating more than her .  She is wondering if he could possibly be menopausal type symptoms.  Eating and drinking well.  No associated fever/chills, nausea, vomiting, abdominal pain, urinary urgency, dysuria, hematuria, diarrhea, constipation, blood in stool, vaginal symptoms.  No STD concerns.    PAST MEDICAL HISTORY:   Past Medical History:   Diagnosis Date     Obesity 2013       PAST SURGICAL HISTORY:   Past Surgical History:   Procedure Laterality Date     BACK SURGERY  2005     BIOPSY OF SKIN LESION       CHOLECYSTECTOMY  2011     COLONOSCOPY N/A 2022    serrated adenoma, follow uo 2023     HYSTERECTOMY, PAP STILL INDICATED  2012    CERVIX STILL REMAINS.     HYSTERECTOMY, SUPRACERVICAL LAPAROSCOPIC N/A 2012    Cervix and bilateral ovaries remain. Performed due to menorrhagia, precancerous cells     ORTHOPEDIC SURGERY  2005    back surgery     TONSILLECTOMY       TUBAL LIGATION  2007       FAMILY HISTORY:   Family History   Adopted: Yes   Problem Relation Age of Onset     Lipids Mother      Hypertension Mother      Irritable Bowel Syndrome Mother      Obesity Mother      Rheumatoid Arthritis Mother      Diabetes Mother      Hearing Loss Father      Lipids Father      Hypertension Father      Diabetes Father      No Known Problems Sister      Cancer Maternal Grandmother      Osteoporosis Maternal Grandmother      Cancer Paternal Grandmother        SOCIAL HISTORY:   Social History     Tobacco Use     Smoking status: Former     Types: Cigarettes     Quit date: 2008     Years since quittin.8     Smokeless tobacco:  Never   Substance Use Topics     Alcohol use: Yes     Comment: occasionally - a couple times a month        Allergies   Allergen Reactions     Seasonal Allergies Anaphylaxis     Birch, Dog and Cats - has Epi-pen for this reason     Codeine GI Disturbance     Current Outpatient Medications   Medication     EPINEPHrine (ANY BX GENERIC EQUIV) 0.3 MG/0.3ML injection 2-pack     fexofenadine (ALLEGRA) 180 MG tablet     ibuprofen (ADVIL/MOTRIN) 200 MG tablet     ORDER FOR ALLERGEN IMMUNOTHERAPY     No current facility-administered medications for this visit.         Review of Systems   Per HPI        Objective    /82   Pulse 103   Temp 98.9  F (37.2  C)   Resp 14   Wt 115.4 kg (254 lb 6.4 oz)   LMP  (LMP Unknown)   SpO2 98%   BMI 42.33 kg/m    Body mass index is 42.33 kg/m .  Physical Exam   General: Pleasant, in no apparent distress.  Cardiovascular: Regular rate and rhythm with S1 equal to S2. No murmurs, friction rubs, or gallops.   Respiratory: Lungs are resonant and clear to auscultation bilaterally. No wheezes, crackles, or rhonchi.  Abdomen: Abdomen is non-distended. Active bowel sounds heard in all four quadrants. Mild diffuse tenderness throughout abdomen. No rebound tenderness or guarding. No palpable masses noted. No hepatosplenomegaly.  Musculoskeletal: Back is straight, mild tenderness to palpation over lateral mid lumbar through lower thoracic region, near flank on right.   Neurologic Exam: Normal gross motor, tone coordination and no visible tremor.  Psych: Appropriate mood and affect.    Results for orders placed or performed in visit on 02/21/23   UA reflex to Microscopic and Culture     Status: Abnormal    Specimen: Urine, Clean Catch   Result Value Ref Range    Color Urine Yellow Colorless, Straw, Light Yellow, Yellow    Appearance Urine Clear Clear    Glucose Urine Negative Negative mg/dL    Bilirubin Urine Negative Negative    Ketones Urine Negative Negative mg/dL    Specific Gravity Urine  1.010 1.005 - 1.030    Blood Urine Negative Negative    pH Urine 5.5 5.0 - 9.0    Protein Albumin Urine Negative Negative mg/dL    Urobilinogen Urine Normal Normal, 2.0 mg/dL    Nitrite Urine Negative Negative    Leukocyte Esterase Urine Negative Negative    Bacteria Urine Few (A) None Seen /HPF    RBC Urine 1 <=2 /HPF    WBC Urine 1 <=5 /HPF    Narrative    Microscopic not indicated  Urine Culture not indicated   Comprehensive Metabolic Panel     Status: Normal   Result Value Ref Range    Sodium 139 136 - 145 mmol/L    Potassium 4.1 3.4 - 5.3 mmol/L    Chloride 104 98 - 107 mmol/L    Carbon Dioxide (CO2) 26 22 - 29 mmol/L    Anion Gap 9 7 - 15 mmol/L    Urea Nitrogen 9.0 6.0 - 20.0 mg/dL    Creatinine 0.80 0.51 - 0.95 mg/dL    Calcium 9.2 8.6 - 10.0 mg/dL    Glucose 95 70 - 99 mg/dL    Alkaline Phosphatase 68 35 - 104 U/L    AST 27 10 - 35 U/L    ALT 34 10 - 35 U/L    Protein Total 7.1 6.4 - 8.3 g/dL    Albumin 4.5 3.5 - 5.2 g/dL    Bilirubin Total 0.5 <=1.2 mg/dL    GFR Estimate >90 >60 mL/min/1.73m2   CBC with platelets and differential     Status: None   Result Value Ref Range    WBC Count 7.5 4.0 - 11.0 10e3/uL    RBC Count 4.60 3.80 - 5.20 10e6/uL    Hemoglobin 13.3 11.7 - 15.7 g/dL    Hematocrit 38.7 35.0 - 47.0 %    MCV 84 78 - 100 fL    MCH 28.9 26.5 - 33.0 pg    MCHC 34.4 31.5 - 36.5 g/dL    RDW 12.4 10.0 - 15.0 %    Platelet Count 225 150 - 450 10e3/uL    % Neutrophils 64 %    % Lymphocytes 24 %    % Monocytes 9 %    % Eosinophils 2 %    % Basophils 1 %    % Immature Granulocytes 0 %    NRBCs per 100 WBC 0 <1 /100    Absolute Neutrophils 4.8 1.6 - 8.3 10e3/uL    Absolute Lymphocytes 1.8 0.8 - 5.3 10e3/uL    Absolute Monocytes 0.7 0.0 - 1.3 10e3/uL    Absolute Eosinophils 0.1 0.0 - 0.7 10e3/uL    Absolute Basophils 0.1 0.0 - 0.2 10e3/uL    Absolute Immature Granulocytes 0.0 <=0.4 10e3/uL    Absolute NRBCs 0.0 10e3/uL   CBC and Differential     Status: None    Narrative    The following orders were created  for panel order CBC and Differential.  Procedure                               Abnormality         Status                     ---------                               -----------         ------                     CBC with platelets and d...[[id number]]                      Final result                 Please view results for these tests on the individual orders.

## 2023-02-22 ENCOUNTER — HOSPITAL ENCOUNTER (OUTPATIENT)
Dept: CT IMAGING | Facility: OTHER | Age: 43
Discharge: HOME | End: 2023-02-22
Attending: PHYSICIAN ASSISTANT | Admitting: PHYSICIAN ASSISTANT
Payer: COMMERCIAL

## 2023-02-22 DIAGNOSIS — R10.9 RIGHT FLANK PAIN: ICD-10-CM

## 2023-02-22 PROCEDURE — 74176 CT ABD & PELVIS W/O CONTRAST: CPT

## 2023-02-23 ENCOUNTER — MYC MEDICAL ADVICE (OUTPATIENT)
Dept: FAMILY MEDICINE | Facility: OTHER | Age: 43
End: 2023-02-23
Payer: COMMERCIAL

## 2023-02-23 DIAGNOSIS — R10.9 RIGHT FLANK PAIN: Primary | ICD-10-CM

## 2023-03-02 ENCOUNTER — OFFICE VISIT (OUTPATIENT)
Dept: OBGYN | Facility: OTHER | Age: 43
End: 2023-03-02
Attending: PHYSICIAN ASSISTANT
Payer: COMMERCIAL

## 2023-03-02 VITALS
DIASTOLIC BLOOD PRESSURE: 78 MMHG | BODY MASS INDEX: 41.93 KG/M2 | SYSTOLIC BLOOD PRESSURE: 138 MMHG | HEART RATE: 67 BPM | WEIGHT: 252 LBS

## 2023-03-02 DIAGNOSIS — Z12.4 CERVICAL CANCER SCREENING: ICD-10-CM

## 2023-03-02 DIAGNOSIS — R10.9 RIGHT FLANK PAIN: ICD-10-CM

## 2023-03-02 DIAGNOSIS — N83.8 OVARIAN MASS: Primary | ICD-10-CM

## 2023-03-02 LAB — CANCER AG125 SERPL-ACNC: 31 U/ML

## 2023-03-02 PROCEDURE — 87624 HPV HI-RISK TYP POOLED RSLT: CPT | Mod: ZL | Performed by: OBSTETRICS & GYNECOLOGY

## 2023-03-02 PROCEDURE — G0123 SCREEN CERV/VAG THIN LAYER: HCPCS | Performed by: OBSTETRICS & GYNECOLOGY

## 2023-03-02 PROCEDURE — 86304 IMMUNOASSAY TUMOR CA 125: CPT | Mod: ZL | Performed by: OBSTETRICS & GYNECOLOGY

## 2023-03-02 PROCEDURE — 99203 OFFICE O/P NEW LOW 30 MIN: CPT | Performed by: OBSTETRICS & GYNECOLOGY

## 2023-03-02 PROCEDURE — 36415 COLL VENOUS BLD VENIPUNCTURE: CPT | Mod: ZL | Performed by: OBSTETRICS & GYNECOLOGY

## 2023-03-02 ASSESSMENT — PAIN SCALES - GENERAL: PAINLEVEL: MODERATE PAIN (4)

## 2023-03-02 NOTE — PROGRESS NOTES
CC:Possible ovarian cyst.   HPI:  Gwendolyn is a 42 year old female who presents for possible ovarian cyst. Pain to Right flank for 1 month, dull aching, worse with activity. Ibuprofen does not help, but does help with chronic back pain.   No LMP recorded (lmp unknown). Patient has had a hysterectomy. Partial in   Menstrual history: None- History of supracervical hysterectomy, uncertain if tubes remain, not specified in op note and path note not available.  Bladder concerns: Slight frequency, no burning, dysuria, feels complete emptying  Bowel concerns: irregular at baseline, no bleeding, regular conoloscopy  Breast concerns: None currently, hx of   Birth control: No  STI history: None    Pap Smears: Due today  Mammograms: Due, does in [place]. Annual.     OB History    Para Term  AB Living   2 2 2 0 0 0   SAB IAB Ectopic Multiple Live Births   0 0 0 0 0      # Outcome Date GA Lbr Santos/2nd Weight Sex Delivery Anes PTL Lv   2 Term            1 Term              Past Medical History:   Diagnosis Date     Obesity 2013     Past Surgical History:   Procedure Laterality Date     BACK SURGERY  2005     BIOPSY OF SKIN LESION       CHOLECYSTECTOMY  2011     COLONOSCOPY N/A 2022    serrated adenoma, follow uo 2023     HYSTERECTOMY, PAP STILL INDICATED  2012    CERVIX STILL REMAINS.     HYSTERECTOMY, SUPRACERVICAL LAPAROSCOPIC N/A 2012    Cervix and bilateral ovaries remain. Performed due to menorrhagia, precancerous cells     ORTHOPEDIC SURGERY  2005    back surgery     TONSILLECTOMY       TUBAL LIGATION  2007     Social History     Socioeconomic History     Marital status:      Spouse name: Not on file     Number of children: Not on file     Years of education: Not on file     Highest education level: Not on file   Occupational History     Not on file   Tobacco Use     Smoking status: Former     Types: Cigarettes     Quit date: 2008     Years since  quittin.8     Smokeless tobacco: Never   Vaping Use     Vaping Use: Never used   Substance and Sexual Activity     Alcohol use: Yes     Comment: occasionally - a couple times a month     Drug use: Yes     Types: Marijuana     Sexual activity: Yes     Partners: Male     Birth control/protection: Female Surgical   Other Topics Concern     Parent/sibling w/ CABG, MI or angioplasty before 65F 55M? No      Service Not Asked     Blood Transfusions Not Asked     Caffeine Concern Not Asked     Occupational Exposure Not Asked     Hobby Hazards Not Asked     Sleep Concern Not Asked     Stress Concern Not Asked     Weight Concern Not Asked     Special Diet Not Asked     Back Care Not Asked     Exercise Not Asked     Bike Helmet Not Asked     Seat Belt Yes     Self-Exams Not Asked   Social History Narrative     Not on file     Social Determinants of Health     Financial Resource Strain: Not on file   Food Insecurity: Not on file   Transportation Needs: Not on file   Physical Activity: Not on file   Stress: Not on file   Social Connections: Not on file   Intimate Partner Violence: Not on file   Housing Stability: Not on file     Family History   Adopted: Yes   Problem Relation Age of Onset     Lipids Mother      Hypertension Mother      Irritable Bowel Syndrome Mother      Obesity Mother      Rheumatoid Arthritis Mother      Diabetes Mother      Hearing Loss Father      Lipids Father      Hypertension Father      Diabetes Father      No Known Problems Sister      Cancer Maternal Grandmother      Osteoporosis Maternal Grandmother      Cancer Paternal Grandmother        Current Outpatient Medications   Medication     EPINEPHrine (ANY BX GENERIC EQUIV) 0.3 MG/0.3ML injection 2-pack     fexofenadine (ALLEGRA) 180 MG tablet     ibuprofen (ADVIL/MOTRIN) 200 MG tablet     magnesium oxide 200 MG TABS     ORDER FOR ALLERGEN IMMUNOTHERAPY     No current facility-administered medications for this visit.     Allergies    Allergen Reactions     Seasonal Allergies Anaphylaxis     Birch, Dog and Cats - has Epi-pen for this reason     Codeine GI Disturbance     /78   Pulse 67   Wt 114.3 kg (252 lb)   LMP  (LMP Unknown)   BMI 41.93 kg/m      REVIEW OF SYSTEMS   Neg for bowel, note frequency of urination, no blood in urine or stools    Exam:  Constitutional: healthy, alert, active and no distress  Resp: normal respirations, non-labored  Abdomen: obese, ND, NT  Pelvic: Normal BUSE, some labial hypopigmentation vulva appears normal, vagina and cervix are normal. Pap obtained. Uterus is normal size, shape position and mobility without adnexal mass or tenderness. Chaperone was present.     Lab: No results found for any visits on 03/02/23.    ASSESSMENT/PLAN :  1. Right flank pain      CT reviewed, will order US to better characterize adnexal mass as well as get a Ca 125. Discussed expectancy, possibility or neoplasm. Will likely schedule for diagnostic laparoscopy and proceed to excision of adnexal mass and pelvic washings for staging if malignancy is found. If Ca 125 is significantly elevated will refer to gyn onc for management and staging.    Earnest Brooks MD FACOG  8:34 AM 3/2/2023

## 2023-03-02 NOTE — NURSING NOTE
"Chief Complaint   Patient presents with     Consult     CYST   PATIENT IS HERE TO DISCUSS CYST PER CT SCAN. PATIENT IS HAVING RIGHT SIDED BACK PAIN. PATIENT HAS A LOT OF FATIGUE X 2 MONTHS, MORE FREQUENT URINATATION.   Initial /78   Pulse 67   Wt 114.3 kg (252 lb)   LMP  (LMP Unknown)   BMI 41.93 kg/m   Estimated body mass index is 41.93 kg/m  as calculated from the following:    Height as of 6/22/22: 1.651 m (5' 5\").    Weight as of this encounter: 114.3 kg (252 lb).  Medication Reconciliation: complete    FOOD SECURITY SCREENING QUESTIONS  Hunger Vital Signs:  Within the past 12 months we worried whether our food would run out before we got money to buy more. Never   Within the past 12 months the food we bought just didn't last and we didn't have money to get more. Never  Damaris Canada LPN 3/2/2023 8:26 AM             Damaris Canada LPN  "

## 2023-03-06 ENCOUNTER — ALLIED HEALTH/NURSE VISIT (OUTPATIENT)
Dept: ALLERGY | Facility: OTHER | Age: 43
End: 2023-03-06
Attending: PHYSICIAN ASSISTANT
Payer: COMMERCIAL

## 2023-03-06 DIAGNOSIS — J30.89 PERENNIAL ALLERGIC RHINITIS: Primary | ICD-10-CM

## 2023-03-06 LAB
BKR LAB AP GYN ADEQUACY: NORMAL
BKR LAB AP GYN INTERPRETATION: NORMAL
BKR LAB AP HPV REFLEX: NORMAL
BKR LAB AP PREVIOUS ABNORMAL: NORMAL
PATH REPORT.COMMENTS IMP SPEC: NORMAL
PATH REPORT.COMMENTS IMP SPEC: NORMAL
PATH REPORT.RELEVANT HX SPEC: NORMAL

## 2023-03-06 PROCEDURE — 95117 IMMUNOTHERAPY INJECTIONS: CPT

## 2023-03-06 NOTE — PROGRESS NOTES
Prior to injection verified pt identity using pt name and date of birth.    Allergy injection/s given and charted on paper allergy flow sheet.  Patient left AMA, signing form, not staying for the observation period.    Ena Dejesus RN on 3/6/2023 at 8:07 AM        
10-Jul-2020 15:24

## 2023-03-08 LAB
HUMAN PAPILLOMA VIRUS 16 DNA: NEGATIVE
HUMAN PAPILLOMA VIRUS 18 DNA: NEGATIVE
HUMAN PAPILLOMA VIRUS FINAL DIAGNOSIS: NORMAL
HUMAN PAPILLOMA VIRUS OTHER HR: NEGATIVE

## 2023-03-14 ENCOUNTER — HOSPITAL ENCOUNTER (OUTPATIENT)
Dept: ULTRASOUND IMAGING | Facility: OTHER | Age: 43
Discharge: HOME | End: 2023-03-14
Attending: OBSTETRICS & GYNECOLOGY | Admitting: OBSTETRICS & GYNECOLOGY
Payer: COMMERCIAL

## 2023-03-14 DIAGNOSIS — N83.8 OVARIAN MASS: ICD-10-CM

## 2023-03-14 PROCEDURE — 76856 US EXAM PELVIC COMPLETE: CPT

## 2023-03-15 ENCOUNTER — PREP FOR PROCEDURE (OUTPATIENT)
Dept: OBGYN | Facility: OTHER | Age: 43
End: 2023-03-15
Payer: COMMERCIAL

## 2023-03-15 DIAGNOSIS — N94.9 ADNEXAL CYST: Primary | ICD-10-CM

## 2023-03-15 RX ORDER — ACETAMINOPHEN 325 MG/1
975 TABLET ORAL ONCE
Status: CANCELLED | OUTPATIENT
Start: 2023-03-15 | End: 2023-03-15

## 2023-03-15 RX ORDER — CEFAZOLIN SODIUM 2 G/100ML
2 INJECTION, SOLUTION INTRAVENOUS SEE ADMIN INSTRUCTIONS
Status: CANCELLED | OUTPATIENT
Start: 2023-03-15

## 2023-03-15 RX ORDER — CEFAZOLIN SODIUM 2 G/100ML
2 INJECTION, SOLUTION INTRAVENOUS
Status: CANCELLED | OUTPATIENT
Start: 2023-03-15

## 2023-03-20 ENCOUNTER — ALLIED HEALTH/NURSE VISIT (OUTPATIENT)
Dept: ALLERGY | Facility: OTHER | Age: 43
End: 2023-03-20
Attending: PHYSICIAN ASSISTANT
Payer: COMMERCIAL

## 2023-03-20 DIAGNOSIS — J30.89 PERENNIAL ALLERGIC RHINITIS: Primary | ICD-10-CM

## 2023-03-20 PROCEDURE — 95117 IMMUNOTHERAPY INJECTIONS: CPT

## 2023-03-20 NOTE — PROGRESS NOTES
Prior to injection verified pt identity using pt name and date of birth.    Allergy injection/s given and charted on paper allergy flow sheet.  Patient left AMA, signing form, not staying for the observation period.    Ena Dejesus RN on 3/20/2023 at 8:12 AM

## 2023-04-03 ENCOUNTER — ALLIED HEALTH/NURSE VISIT (OUTPATIENT)
Dept: ALLERGY | Facility: OTHER | Age: 43
End: 2023-04-03
Attending: PHYSICIAN ASSISTANT
Payer: COMMERCIAL

## 2023-04-03 DIAGNOSIS — J30.89 PERENNIAL ALLERGIC RHINITIS: Primary | ICD-10-CM

## 2023-04-03 DIAGNOSIS — J30.9 ALLERGIC RHINITIS: ICD-10-CM

## 2023-04-03 PROCEDURE — 95117 IMMUNOTHERAPY INJECTIONS: CPT

## 2023-04-03 NOTE — PROGRESS NOTES
Prior to injection verified pt identity using pt name and date of birth.    Allergy injection/s given and charted on paper allergy flow sheet.  Patient left AMA, signing form, not staying for the observation period.    Dominik Solorio RN on 4/3/2023 at 8:14 AM

## 2023-04-04 ENCOUNTER — MYC MEDICAL ADVICE (OUTPATIENT)
Dept: OBGYN | Facility: OTHER | Age: 43
End: 2023-04-04

## 2023-04-04 ENCOUNTER — OFFICE VISIT (OUTPATIENT)
Dept: OBGYN | Facility: OTHER | Age: 43
End: 2023-04-04
Attending: OBSTETRICS & GYNECOLOGY
Payer: COMMERCIAL

## 2023-04-04 VITALS
SYSTOLIC BLOOD PRESSURE: 134 MMHG | DIASTOLIC BLOOD PRESSURE: 78 MMHG | BODY MASS INDEX: 42.6 KG/M2 | WEIGHT: 256 LBS | HEART RATE: 79 BPM

## 2023-04-04 DIAGNOSIS — N94.89 ADNEXAL MASS: Primary | ICD-10-CM

## 2023-04-04 DIAGNOSIS — Z79.890 HORMONE REPLACEMENT THERAPY: Primary | ICD-10-CM

## 2023-04-04 PROCEDURE — 99213 OFFICE O/P EST LOW 20 MIN: CPT | Performed by: OBSTETRICS & GYNECOLOGY

## 2023-04-04 NOTE — H&P (VIEW-ONLY)
CC: preop questions  HPI:  Gwendolyn is a 42 year old female who presents for discussion of upcoming surgery. Breifly she has had some right adnexal discomfort. She had surgery for menstrual issues remotely with surpacervical hysterectomy by Dr. Walker in . She recently developed right LQ pain. CT in February showed an adnexal mass. US in March showed what appears to be a hydrosalpinx on my viewing. She had a normal CA-125. She continues to have mild to moderate pain.    She denies maternal or sibling history of any cancers. Her grandmother may have had ovarian cancer.    No LMP recorded (lmp unknown). Patient has had a hysterectomy.    OB History    Para Term  AB Living   2 2 2 0 0 2   SAB IAB Ectopic Multiple Live Births   0 0 0 0 2      # Outcome Date GA Lbr Santos/2nd Weight Sex Delivery Anes PTL Lv   2 Term         RAFAEL   1 Term         RAFAEL     Past Medical History:   Diagnosis Date     Obesity 2013     Past Surgical History:   Procedure Laterality Date     BACK SURGERY  2005     BIOPSY OF SKIN LESION       CHOLECYSTECTOMY  2011     COLONOSCOPY N/A 2022    serrated adenoma, follow uo 2023     HYSTERECTOMY, PAP STILL INDICATED  2012    CERVIX STILL REMAINS.     HYSTERECTOMY, SUPRACERVICAL LAPAROSCOPIC N/A 2012    Cervix and bilateral ovaries remain. Performed due to menorrhagia, precancerous cells     ORTHOPEDIC SURGERY  2005    back surgery     TONSILLECTOMY       TUBAL LIGATION  2007     Social History     Socioeconomic History     Marital status:      Spouse name: Not on file     Number of children: Not on file     Years of education: Not on file     Highest education level: Not on file   Occupational History     Not on file   Tobacco Use     Smoking status: Former     Types: Cigarettes     Quit date: 2008     Years since quittin.9     Smokeless tobacco: Never   Vaping Use     Vaping status: Never Used   Substance and Sexual  Activity     Alcohol use: Yes     Comment: occasionally - a couple times a month     Drug use: Yes     Types: Marijuana     Sexual activity: Yes     Partners: Male     Birth control/protection: Female Surgical   Other Topics Concern     Parent/sibling w/ CABG, MI or angioplasty before 65F 55M? No      Service Not Asked     Blood Transfusions Not Asked     Caffeine Concern Not Asked     Occupational Exposure Not Asked     Hobby Hazards Not Asked     Sleep Concern Not Asked     Stress Concern Not Asked     Weight Concern Not Asked     Special Diet Not Asked     Back Care Not Asked     Exercise Not Asked     Bike Helmet Not Asked     Seat Belt Yes     Self-Exams Not Asked   Social History Narrative     Not on file     Social Determinants of Health     Financial Resource Strain: Not on file   Food Insecurity: Not on file   Transportation Needs: Not on file   Physical Activity: Not on file   Stress: Not on file   Social Connections: Not on file   Intimate Partner Violence: Not on file   Housing Stability: Not on file     Family History   Adopted: Yes   Problem Relation Age of Onset     Lipids Mother      Hypertension Mother      Irritable Bowel Syndrome Mother      Obesity Mother      Rheumatoid Arthritis Mother      Diabetes Mother      Hearing Loss Father      Lipids Father      Hypertension Father      Diabetes Father      No Known Problems Sister      Cancer Maternal Grandmother      Osteoporosis Maternal Grandmother      Cancer Paternal Grandmother      Current Outpatient Medications   Medication     EPINEPHrine (ANY BX GENERIC EQUIV) 0.3 MG/0.3ML injection 2-pack     fexofenadine (ALLEGRA) 180 MG tablet     ibuprofen (ADVIL/MOTRIN) 200 MG tablet     magnesium oxide 200 MG TABS     No current facility-administered medications for this visit.     Allergies   Allergen Reactions     Seasonal Allergies Anaphylaxis     Birch, Dog and Cats - has Epi-pen for this reason     Codeine GI Disturbance     /78    Pulse 79   Wt 116.1 kg (256 lb)   LMP  (LMP Unknown)   BMI 42.60 kg/m      REVIEW OF SYSTEMS  Neg except as above    Exam:  Constitutional: healthy, alert, active and no distress        Lab: No results found for any visits on 04/04/23.    ASSESSMENT/PLAN :  1. Adnexal mass      Discussed conservative vs surgical management. She would like surgical removal of the mass as well as her ovaries. We will do this laparoscopically if possible. Discussed also doing pelvic washing in the event of an occult malignancy for staging purposes. She asked appropriate questions and risks, and benefits, and alternatives were discussed. She wishes to proceed.  She was referred to [facility] for review of hot flash treatment after oophorectomy.      Earnest Brooks MD FACOG  10:36 AM 4/4/2023

## 2023-04-04 NOTE — PROGRESS NOTES
CC: preop questions  HPI:  Gwendolyn is a 42 year old female who presents for discussion of upcoming surgery. Breifly she has had some right adnexal discomfort. She had surgery for menstrual issues remotely with surpacervical hysterectomy by Dr. Walker in . She recently developed right LQ pain. CT in February showed an adnexal mass. US in March showed what appears to be a hydrosalpinx on my viewing. She had a normal CA-125. She continues to have mild to moderate pain.    She denies maternal or sibling history of any cancers. Her grandmother may have had ovarian cancer.    No LMP recorded (lmp unknown). Patient has had a hysterectomy.    OB History    Para Term  AB Living   2 2 2 0 0 2   SAB IAB Ectopic Multiple Live Births   0 0 0 0 2      # Outcome Date GA Lbr Santos/2nd Weight Sex Delivery Anes PTL Lv   2 Term         RAFAEL   1 Term         RAFAEL     Past Medical History:   Diagnosis Date     Obesity 2013     Past Surgical History:   Procedure Laterality Date     BACK SURGERY  2005     BIOPSY OF SKIN LESION       CHOLECYSTECTOMY  2011     COLONOSCOPY N/A 2022    serrated adenoma, follow uo 2023     HYSTERECTOMY, PAP STILL INDICATED  2012    CERVIX STILL REMAINS.     HYSTERECTOMY, SUPRACERVICAL LAPAROSCOPIC N/A 2012    Cervix and bilateral ovaries remain. Performed due to menorrhagia, precancerous cells     ORTHOPEDIC SURGERY  2005    back surgery     TONSILLECTOMY       TUBAL LIGATION  2007     Social History     Socioeconomic History     Marital status:      Spouse name: Not on file     Number of children: Not on file     Years of education: Not on file     Highest education level: Not on file   Occupational History     Not on file   Tobacco Use     Smoking status: Former     Types: Cigarettes     Quit date: 2008     Years since quittin.9     Smokeless tobacco: Never   Vaping Use     Vaping status: Never Used   Substance and Sexual  Activity     Alcohol use: Yes     Comment: occasionally - a couple times a month     Drug use: Yes     Types: Marijuana     Sexual activity: Yes     Partners: Male     Birth control/protection: Female Surgical   Other Topics Concern     Parent/sibling w/ CABG, MI or angioplasty before 65F 55M? No      Service Not Asked     Blood Transfusions Not Asked     Caffeine Concern Not Asked     Occupational Exposure Not Asked     Hobby Hazards Not Asked     Sleep Concern Not Asked     Stress Concern Not Asked     Weight Concern Not Asked     Special Diet Not Asked     Back Care Not Asked     Exercise Not Asked     Bike Helmet Not Asked     Seat Belt Yes     Self-Exams Not Asked   Social History Narrative     Not on file     Social Determinants of Health     Financial Resource Strain: Not on file   Food Insecurity: Not on file   Transportation Needs: Not on file   Physical Activity: Not on file   Stress: Not on file   Social Connections: Not on file   Intimate Partner Violence: Not on file   Housing Stability: Not on file     Family History   Adopted: Yes   Problem Relation Age of Onset     Lipids Mother      Hypertension Mother      Irritable Bowel Syndrome Mother      Obesity Mother      Rheumatoid Arthritis Mother      Diabetes Mother      Hearing Loss Father      Lipids Father      Hypertension Father      Diabetes Father      No Known Problems Sister      Cancer Maternal Grandmother      Osteoporosis Maternal Grandmother      Cancer Paternal Grandmother      Current Outpatient Medications   Medication     EPINEPHrine (ANY BX GENERIC EQUIV) 0.3 MG/0.3ML injection 2-pack     fexofenadine (ALLEGRA) 180 MG tablet     ibuprofen (ADVIL/MOTRIN) 200 MG tablet     magnesium oxide 200 MG TABS     No current facility-administered medications for this visit.     Allergies   Allergen Reactions     Seasonal Allergies Anaphylaxis     Birch, Dog and Cats - has Epi-pen for this reason     Codeine GI Disturbance     /78    Pulse 79   Wt 116.1 kg (256 lb)   LMP  (LMP Unknown)   BMI 42.60 kg/m      REVIEW OF SYSTEMS  Neg except as above    Exam:  Constitutional: healthy, alert, active and no distress        Lab: No results found for any visits on 04/04/23.    ASSESSMENT/PLAN :  1. Adnexal mass      Discussed conservative vs surgical management. She would like surgical removal of the mass as well as her ovaries. We will do this laparoscopically if possible. Discussed also doing pelvic washing in the event of an occult malignancy for staging purposes. She asked appropriate questions and risks, and benefits, and alternatives were discussed. She wishes to proceed.  She was referred to [facility] for review of hot flash treatment after oophorectomy.      Earnest Brooks MD FACOG  10:36 AM 4/4/2023

## 2023-04-04 NOTE — NURSING NOTE
"Date of Surgery: 4/19/23  Type of Surgery: Diagnostic Laparoscopy  Surgeon: Dr. Earnest Brooks     Patient was given surgical folder  which includes pre-operative bathing instructions related to the two packets of Hibiclens surgical prep provided. appropriate appointments were scheduled by the Unit [facility] .. Surgical forms were copied and kept for informative purposes. Originals were delivered to Day-surgery. Questions were answered to the best of this nurse's ability.        STOP BANG    Fever/Chills or other infectious symptoms in past month? no  >10 pound weight loss in the past 2 months? no  Health Care Directive on file? no  History of blood transfusions? no  Td up to date? No, unable to update today  History of VRE/MRSA? no      Obstructive Sleep Apnea screening    Preoperative Evaluation: Obstructive Sleep Apnea screening    S: Snore -  Do you snore loudly? (louder than talking or loud enough to be heard through closed doors) No  T: Tired - Do you often feel tired, fatigued, or sleepy during the daytime?No  O: Observed - Has anyone ever observed you stop breathing during your sleep?No  P: Pressure - Do you have or are you being treated for high blood pressure?No  B: BMI - BMI greater than 35kg/m2?Yes  A: Age - Age over 50 years old?No  N: Neck - Neck circumference greater than 40 cm?Yes  G: Gender - Gender: Male?No    Total number of \"YES\" responses:  2    Scoring: Low risk of ARIAS 0-2  At Risk of ARIAS: >3 High Risk of ARIAS: 5-8      Total yes answers in ARIAS section:    Low risk 0-2  At risk 3-4  High risk 5-8    Kaity Lowry RN............. 4/4/2023 10:25 AM     "

## 2023-04-04 NOTE — NURSING NOTE
"Chief Complaint   Patient presents with     Pre-Op Exam     Questions about surg      PATIENT IS HERE TO DISCUSS SURG.   Initial /78   Pulse 79   LMP  (LMP Unknown)  Estimated body mass index is 41.93 kg/m  as calculated from the following:    Height as of 6/22/22: 1.651 m (5' 5\").    Weight as of 3/2/23: 114.3 kg (252 lb).  Medication Reconciliation: complete          Damaris Canada LPN  "

## 2023-04-10 RX ORDER — ESTRADIOL 0.05 MG/D
1 PATCH, EXTENDED RELEASE TRANSDERMAL
Qty: 24 PATCH | Refills: 3 | Status: SHIPPED | OUTPATIENT
Start: 2023-04-10 | End: 2023-09-06

## 2023-04-17 ENCOUNTER — MYC REFILL (OUTPATIENT)
Dept: FAMILY MEDICINE | Facility: OTHER | Age: 43
End: 2023-04-17
Payer: COMMERCIAL

## 2023-04-17 ENCOUNTER — ALLIED HEALTH/NURSE VISIT (OUTPATIENT)
Dept: ALLERGY | Facility: OTHER | Age: 43
End: 2023-04-17
Attending: PHYSICIAN ASSISTANT
Payer: COMMERCIAL

## 2023-04-17 ENCOUNTER — TELEPHONE (OUTPATIENT)
Dept: OTOLARYNGOLOGY | Facility: OTHER | Age: 43
End: 2023-04-17

## 2023-04-17 DIAGNOSIS — T78.40XS ALLERGIC REACTION, SEQUELA: Primary | ICD-10-CM

## 2023-04-17 DIAGNOSIS — J30.89 PERENNIAL ALLERGIC RHINITIS: Primary | ICD-10-CM

## 2023-04-17 PROCEDURE — 95117 IMMUNOTHERAPY INJECTIONS: CPT

## 2023-04-17 RX ORDER — EPINEPHRINE 0.3 MG/.3ML
0.3 INJECTION SUBCUTANEOUS PRN
Qty: 2 EACH | Status: CANCELLED | OUTPATIENT
Start: 2023-04-17

## 2023-04-17 NOTE — TELEPHONE ENCOUNTER
Patient needs an updated order for allergy injections.  New order pended.  Please review and sign, thank you!    Ena Dejesus RN on 4/17/2023 at 8:12 AM

## 2023-04-17 NOTE — PROGRESS NOTES
Prior to injection verified pt identity using pt name and date of birth.    Allergy injection/s given and charted on paper allergy flow sheet.  Patient left AMA, signing form, not staying for the observation period.    Ena Dejesus RN on 4/17/2023 at 8:08 AM

## 2023-04-18 ENCOUNTER — ANESTHESIA EVENT (OUTPATIENT)
Dept: SURGERY | Facility: OTHER | Age: 43
End: 2023-04-18
Payer: COMMERCIAL

## 2023-04-19 ENCOUNTER — ANESTHESIA (OUTPATIENT)
Dept: SURGERY | Facility: OTHER | Age: 43
End: 2023-04-19
Payer: COMMERCIAL

## 2023-04-19 ENCOUNTER — HOSPITAL ENCOUNTER (OUTPATIENT)
Facility: OTHER | Age: 43
Discharge: HOME | End: 2023-04-19
Attending: OBSTETRICS & GYNECOLOGY | Admitting: OBSTETRICS & GYNECOLOGY
Payer: COMMERCIAL

## 2023-04-19 VITALS
WEIGHT: 256 LBS | SYSTOLIC BLOOD PRESSURE: 127 MMHG | DIASTOLIC BLOOD PRESSURE: 83 MMHG | RESPIRATION RATE: 24 BRPM | HEART RATE: 95 BPM | TEMPERATURE: 99 F | OXYGEN SATURATION: 94 % | BODY MASS INDEX: 42.6 KG/M2

## 2023-04-19 DIAGNOSIS — G89.18 POSTOPERATIVE PAIN: Primary | ICD-10-CM

## 2023-04-19 LAB
ABO/RH(D): NORMAL
ANTIBODY SCREEN: NEGATIVE
SPECIMEN EXPIRATION DATE: NORMAL

## 2023-04-19 PROCEDURE — 36415 COLL VENOUS BLD VENIPUNCTURE: CPT | Performed by: OBSTETRICS & GYNECOLOGY

## 2023-04-19 PROCEDURE — 250N000011 HC RX IP 250 OP 636: Performed by: NURSE ANESTHETIST, CERTIFIED REGISTERED

## 2023-04-19 PROCEDURE — 250N000013 HC RX MED GY IP 250 OP 250 PS 637: Performed by: OBSTETRICS & GYNECOLOGY

## 2023-04-19 PROCEDURE — 370N000017 HC ANESTHESIA TECHNICAL FEE, PER MIN: Performed by: OBSTETRICS & GYNECOLOGY

## 2023-04-19 PROCEDURE — 250N000009 HC RX 250: Performed by: NURSE ANESTHETIST, CERTIFIED REGISTERED

## 2023-04-19 PROCEDURE — 710N000010 HC RECOVERY PHASE 1, LEVEL 2, PER MIN: Performed by: OBSTETRICS & GYNECOLOGY

## 2023-04-19 PROCEDURE — 88305 TISSUE EXAM BY PATHOLOGIST: CPT

## 2023-04-19 PROCEDURE — 258N000003 HC RX IP 258 OP 636: Performed by: NURSE ANESTHETIST, CERTIFIED REGISTERED

## 2023-04-19 PROCEDURE — 360N000076 HC SURGERY LEVEL 3, PER MIN: Performed by: OBSTETRICS & GYNECOLOGY

## 2023-04-19 PROCEDURE — 272N000001 HC OR GENERAL SUPPLY STERILE: Performed by: OBSTETRICS & GYNECOLOGY

## 2023-04-19 PROCEDURE — 710N000012 HC RECOVERY PHASE 2, PER MINUTE: Performed by: OBSTETRICS & GYNECOLOGY

## 2023-04-19 PROCEDURE — 58661 LAPAROSCOPY REMOVE ADNEXA: CPT | Performed by: NURSE ANESTHETIST, CERTIFIED REGISTERED

## 2023-04-19 PROCEDURE — 250N000011 HC RX IP 250 OP 636: Performed by: OBSTETRICS & GYNECOLOGY

## 2023-04-19 PROCEDURE — 58661 LAPAROSCOPY REMOVE ADNEXA: CPT | Performed by: OBSTETRICS & GYNECOLOGY

## 2023-04-19 PROCEDURE — 86850 RBC ANTIBODY SCREEN: CPT | Performed by: OBSTETRICS & GYNECOLOGY

## 2023-04-19 PROCEDURE — 250N000009 HC RX 250

## 2023-04-19 PROCEDURE — 999N000141 HC STATISTIC PRE-PROCEDURE NURSING ASSESSMENT: Performed by: OBSTETRICS & GYNECOLOGY

## 2023-04-19 PROCEDURE — 258N000001 HC RX 258: Performed by: OBSTETRICS & GYNECOLOGY

## 2023-04-19 PROCEDURE — 250N000025 HC SEVOFLURANE, PER MIN: Performed by: OBSTETRICS & GYNECOLOGY

## 2023-04-19 RX ORDER — OXYCODONE HYDROCHLORIDE 5 MG/1
5-10 TABLET ORAL EVERY 4 HOURS PRN
Qty: 6 TABLET | Refills: 0 | Status: SHIPPED | OUTPATIENT
Start: 2023-04-19 | End: 2023-04-27

## 2023-04-19 RX ORDER — ONDANSETRON 2 MG/ML
INJECTION INTRAMUSCULAR; INTRAVENOUS PRN
Status: DISCONTINUED | OUTPATIENT
Start: 2023-04-19 | End: 2023-04-19

## 2023-04-19 RX ORDER — SCOLOPAMINE TRANSDERMAL SYSTEM 1 MG/1
1 PATCH, EXTENDED RELEASE TRANSDERMAL ONCE
Status: DISCONTINUED | OUTPATIENT
Start: 2023-04-19 | End: 2023-04-19 | Stop reason: HOSPADM

## 2023-04-19 RX ORDER — HYDROMORPHONE HCL IN WATER/PF 6 MG/30 ML
0.2 PATIENT CONTROLLED ANALGESIA SYRINGE INTRAVENOUS EVERY 5 MIN PRN
Status: DISCONTINUED | OUTPATIENT
Start: 2023-04-19 | End: 2023-04-19 | Stop reason: HOSPADM

## 2023-04-19 RX ORDER — OXYCODONE HYDROCHLORIDE 5 MG/1
5 TABLET ORAL
Status: DISCONTINUED | OUTPATIENT
Start: 2023-04-19 | End: 2023-04-19 | Stop reason: HOSPADM

## 2023-04-19 RX ORDER — NALOXONE HYDROCHLORIDE 0.4 MG/ML
0.4 INJECTION, SOLUTION INTRAMUSCULAR; INTRAVENOUS; SUBCUTANEOUS
Status: DISCONTINUED | OUTPATIENT
Start: 2023-04-19 | End: 2023-04-19 | Stop reason: HOSPADM

## 2023-04-19 RX ORDER — DEXAMETHASONE SODIUM PHOSPHATE 4 MG/ML
INJECTION, SOLUTION INTRA-ARTICULAR; INTRALESIONAL; INTRAMUSCULAR; INTRAVENOUS; SOFT TISSUE PRN
Status: DISCONTINUED | OUTPATIENT
Start: 2023-04-19 | End: 2023-04-19

## 2023-04-19 RX ORDER — NALOXONE HYDROCHLORIDE 0.4 MG/ML
0.2 INJECTION, SOLUTION INTRAMUSCULAR; INTRAVENOUS; SUBCUTANEOUS
Status: DISCONTINUED | OUTPATIENT
Start: 2023-04-19 | End: 2023-04-19 | Stop reason: HOSPADM

## 2023-04-19 RX ORDER — FENTANYL CITRATE 50 UG/ML
25 INJECTION, SOLUTION INTRAMUSCULAR; INTRAVENOUS EVERY 5 MIN PRN
Status: DISCONTINUED | OUTPATIENT
Start: 2023-04-19 | End: 2023-04-19 | Stop reason: HOSPADM

## 2023-04-19 RX ORDER — KETAMINE HYDROCHLORIDE 10 MG/ML
INJECTION INTRAMUSCULAR; INTRAVENOUS PRN
Status: DISCONTINUED | OUTPATIENT
Start: 2023-04-19 | End: 2023-04-19

## 2023-04-19 RX ORDER — LIDOCAINE 40 MG/G
CREAM TOPICAL
Status: DISCONTINUED | OUTPATIENT
Start: 2023-04-19 | End: 2023-04-19 | Stop reason: HOSPADM

## 2023-04-19 RX ORDER — KETOROLAC TROMETHAMINE 30 MG/ML
INJECTION, SOLUTION INTRAMUSCULAR; INTRAVENOUS PRN
Status: DISCONTINUED | OUTPATIENT
Start: 2023-04-19 | End: 2023-04-19

## 2023-04-19 RX ORDER — BUPIVACAINE HYDROCHLORIDE 2.5 MG/ML
INJECTION, SOLUTION INFILTRATION; PERINEURAL PRN
Status: DISCONTINUED | OUTPATIENT
Start: 2023-04-19 | End: 2023-04-19 | Stop reason: HOSPADM

## 2023-04-19 RX ORDER — LIDOCAINE HYDROCHLORIDE 20 MG/ML
INJECTION, SOLUTION INFILTRATION; PERINEURAL PRN
Status: DISCONTINUED | OUTPATIENT
Start: 2023-04-19 | End: 2023-04-19

## 2023-04-19 RX ORDER — ACETAMINOPHEN 325 MG/1
975 TABLET ORAL ONCE
Status: DISCONTINUED | OUTPATIENT
Start: 2023-04-19 | End: 2023-04-19 | Stop reason: HOSPADM

## 2023-04-19 RX ORDER — SODIUM CHLORIDE, SODIUM LACTATE, POTASSIUM CHLORIDE, CALCIUM CHLORIDE 600; 310; 30; 20 MG/100ML; MG/100ML; MG/100ML; MG/100ML
INJECTION, SOLUTION INTRAVENOUS CONTINUOUS
Status: DISCONTINUED | OUTPATIENT
Start: 2023-04-19 | End: 2023-04-19 | Stop reason: HOSPADM

## 2023-04-19 RX ORDER — ONDANSETRON 4 MG/1
4 TABLET, ORALLY DISINTEGRATING ORAL EVERY 30 MIN PRN
Status: DISCONTINUED | OUTPATIENT
Start: 2023-04-19 | End: 2023-04-19 | Stop reason: HOSPADM

## 2023-04-19 RX ORDER — GLYCOPYRROLATE 0.2 MG/ML
INJECTION, SOLUTION INTRAMUSCULAR; INTRAVENOUS PRN
Status: DISCONTINUED | OUTPATIENT
Start: 2023-04-19 | End: 2023-04-19

## 2023-04-19 RX ORDER — CEFAZOLIN SODIUM/WATER 2 G/20 ML
2 SYRINGE (ML) INTRAVENOUS SEE ADMIN INSTRUCTIONS
Status: DISCONTINUED | OUTPATIENT
Start: 2023-04-19 | End: 2023-04-19 | Stop reason: HOSPADM

## 2023-04-19 RX ORDER — CEFAZOLIN SODIUM/WATER 2 G/20 ML
2 SYRINGE (ML) INTRAVENOUS
Status: COMPLETED | OUTPATIENT
Start: 2023-04-19 | End: 2023-04-19

## 2023-04-19 RX ORDER — FENTANYL CITRATE 50 UG/ML
50 INJECTION, SOLUTION INTRAMUSCULAR; INTRAVENOUS EVERY 5 MIN PRN
Status: DISCONTINUED | OUTPATIENT
Start: 2023-04-19 | End: 2023-04-19 | Stop reason: HOSPADM

## 2023-04-19 RX ORDER — HYDROMORPHONE HCL IN WATER/PF 6 MG/30 ML
0.4 PATIENT CONTROLLED ANALGESIA SYRINGE INTRAVENOUS EVERY 5 MIN PRN
Status: DISCONTINUED | OUTPATIENT
Start: 2023-04-19 | End: 2023-04-19 | Stop reason: HOSPADM

## 2023-04-19 RX ORDER — IBUPROFEN 200 MG
800 TABLET ORAL ONCE
Status: DISCONTINUED | OUTPATIENT
Start: 2023-04-19 | End: 2023-04-19 | Stop reason: HOSPADM

## 2023-04-19 RX ORDER — ONDANSETRON 2 MG/ML
4 INJECTION INTRAMUSCULAR; INTRAVENOUS EVERY 30 MIN PRN
Status: DISCONTINUED | OUTPATIENT
Start: 2023-04-19 | End: 2023-04-19 | Stop reason: HOSPADM

## 2023-04-19 RX ORDER — ACETAMINOPHEN 325 MG/1
975 TABLET ORAL ONCE
Status: COMPLETED | OUTPATIENT
Start: 2023-04-19 | End: 2023-04-19

## 2023-04-19 RX ORDER — FENTANYL CITRATE 50 UG/ML
INJECTION, SOLUTION INTRAMUSCULAR; INTRAVENOUS PRN
Status: DISCONTINUED | OUTPATIENT
Start: 2023-04-19 | End: 2023-04-19

## 2023-04-19 RX ORDER — PROPOFOL 10 MG/ML
INJECTION, EMULSION INTRAVENOUS PRN
Status: DISCONTINUED | OUTPATIENT
Start: 2023-04-19 | End: 2023-04-19

## 2023-04-19 RX ADMIN — Medication 2 G: at 11:42

## 2023-04-19 RX ADMIN — PROPOFOL 200 MG: 10 INJECTION, EMULSION INTRAVENOUS at 12:04

## 2023-04-19 RX ADMIN — SCOPOLAMINE 1 PATCH: 1 SYSTEM TRANSDERMAL at 10:25

## 2023-04-19 RX ADMIN — DEXMEDETOMIDINE HYDROCHLORIDE 12 MCG: 100 INJECTION, SOLUTION INTRAVENOUS at 12:25

## 2023-04-19 RX ADMIN — KETOROLAC TROMETHAMINE 30 MG: 30 INJECTION, SOLUTION INTRAMUSCULAR at 13:03

## 2023-04-19 RX ADMIN — GLYCOPYRROLATE 0.2 MG: 0.2 INJECTION, SOLUTION INTRAMUSCULAR; INTRAVENOUS at 12:21

## 2023-04-19 RX ADMIN — LIDOCAINE HYDROCHLORIDE 80 MG: 20 INJECTION, SOLUTION INFILTRATION; PERINEURAL at 12:04

## 2023-04-19 RX ADMIN — ROCURONIUM BROMIDE 10 MG: 50 INJECTION, SOLUTION INTRAVENOUS at 12:55

## 2023-04-19 RX ADMIN — ACETAMINOPHEN 975 MG: 325 TABLET ORAL at 10:28

## 2023-04-19 RX ADMIN — SUGAMMADEX 200 MG: 100 INJECTION, SOLUTION INTRAVENOUS at 13:11

## 2023-04-19 RX ADMIN — ONDANSETRON HYDROCHLORIDE 4 MG: 2 SOLUTION INTRAMUSCULAR; INTRAVENOUS at 12:14

## 2023-04-19 RX ADMIN — ROCURONIUM BROMIDE 50 MG: 50 INJECTION, SOLUTION INTRAVENOUS at 12:04

## 2023-04-19 RX ADMIN — Medication 50 MG: at 12:10

## 2023-04-19 RX ADMIN — FENTANYL CITRATE 50 MCG: 50 INJECTION, SOLUTION INTRAMUSCULAR; INTRAVENOUS at 12:55

## 2023-04-19 RX ADMIN — FENTANYL CITRATE 100 MCG: 50 INJECTION, SOLUTION INTRAMUSCULAR; INTRAVENOUS at 12:01

## 2023-04-19 RX ADMIN — MIDAZOLAM HYDROCHLORIDE 2 MG: 1 INJECTION, SOLUTION INTRAMUSCULAR; INTRAVENOUS at 12:01

## 2023-04-19 RX ADMIN — SODIUM CHLORIDE, SODIUM LACTATE, POTASSIUM CHLORIDE, AND CALCIUM CHLORIDE: 600; 310; 30; 20 INJECTION, SOLUTION INTRAVENOUS at 11:07

## 2023-04-19 RX ADMIN — DEXAMETHASONE SODIUM PHOSPHATE 4 MG: 4 INJECTION, SOLUTION INTRA-ARTICULAR; INTRALESIONAL; INTRAMUSCULAR; INTRAVENOUS; SOFT TISSUE at 12:14

## 2023-04-19 RX ADMIN — FENTANYL CITRATE 50 MCG: 50 INJECTION, SOLUTION INTRAMUSCULAR; INTRAVENOUS at 12:59

## 2023-04-19 RX ADMIN — SODIUM CHLORIDE, SODIUM LACTATE, POTASSIUM CHLORIDE, AND CALCIUM CHLORIDE: 600; 310; 30; 20 INJECTION, SOLUTION INTRAVENOUS at 12:30

## 2023-04-19 ASSESSMENT — ACTIVITIES OF DAILY LIVING (ADL)
ADLS_ACUITY_SCORE: 35

## 2023-04-19 NOTE — ANESTHESIA PREPROCEDURE EVALUATION
Anesthesia Pre-Procedure Evaluation    Patient: Gwendolyn Dietrich   MRN: [id number] : 1980        Procedure : Procedure(s):  LAPAROSCOPY; PROCEED TO RIGHT ADNEXECTOMY AND BILATERAL OOPHORECTOMY AS INDICATED          Past Medical History:   Diagnosis Date     Obesity 2013      Past Surgical History:   Procedure Laterality Date     BACK SURGERY  2005     BIOPSY OF SKIN LESION       CHOLECYSTECTOMY  2011     COLONOSCOPY N/A 2022    serrated adenoma, follow uo 2023     HYSTERECTOMY, PAP STILL INDICATED  2012    CERVIX STILL REMAINS.     HYSTERECTOMY, SUPRACERVICAL LAPAROSCOPIC N/A 2012    Cervix and bilateral ovaries remain. Performed due to menorrhagia, precancerous cells     ORTHOPEDIC SURGERY  2005    back surgery     TONSILLECTOMY       TUBAL LIGATION  2007      Allergies   Allergen Reactions     Seasonal Allergies Anaphylaxis     Birch, Dog and Cats - has Epi-pen for this reason     Codeine GI Disturbance      Social History     Tobacco Use     Smoking status: Former     Types: Cigarettes     Quit date: 2008     Years since quitting: 15.0     Smokeless tobacco: Never   Vaping Use     Vaping status: Never Used   Substance Use Topics     Alcohol use: Yes     Comment: occasionally - a couple times a month      Wt Readings from Last 1 Encounters:   23 116.1 kg (256 lb)        Anesthesia Evaluation   Pt has had prior anesthetic. Type: General.    History of anesthetic complications  - PONV.      ROS/MED HX  ENT/Pulmonary:     (+) ARIAS risk factors, snores loudly, obese,     Neurologic:     (+) migraines,     Cardiovascular:  - neg cardiovascular ROS     METS/Exercise Tolerance: >4 METS    Hematologic:  - neg hematologic  ROS     Musculoskeletal:  - neg musculoskeletal ROS     GI/Hepatic:  - neg GI/hepatic ROS     Renal/Genitourinary:  - neg Renal ROS     Endo:  - neg endo ROS   (+) Obesity,     Psychiatric/Substance Use:  - neg psychiatric ROS      Infectious Disease:  - neg infectious disease ROS     Malignancy:  - neg malignancy ROS     Other:  - neg other ROS   (-) Any chance pregnant       Physical Exam    Airway        Mallampati: III   TM distance: < 3 FB   Neck ROM: full   Mouth opening: > 3 cm    Respiratory Devices and Support         Dental       (+) Modest Abnormalities - crowns, retainers, 1 or 2 missing teeth      Cardiovascular   cardiovascular exam normal       Rhythm and rate: regular and normal     Pulmonary   pulmonary exam normal        breath sounds clear to auscultation           OUTSIDE LABS:  CBC:   Lab Results   Component Value Date    WBC 7.5 02/21/2023    WBC 5.0 06/22/2022    HGB 13.3 02/21/2023    HGB 12.6 06/22/2022    HCT 38.7 02/21/2023    HCT 36.9 06/22/2022     02/21/2023     06/22/2022     BMP:   Lab Results   Component Value Date     02/21/2023     11/29/2021    POTASSIUM 4.1 02/21/2023    POTASSIUM 4.0 11/29/2021    CHLORIDE 104 02/21/2023    CHLORIDE 103 11/29/2021    CO2 26 02/21/2023    CO2 27 11/29/2021    BUN 9.0 02/21/2023    BUN 12 11/29/2021    CR 0.80 02/21/2023    CR 0.88 11/29/2021    GLC 95 02/21/2023    GLC 83 11/29/2021     COAGS: No results found for: PTT, INR, FIBR  POC: No results found for: BGM, HCG, HCGS  HEPATIC:   Lab Results   Component Value Date    ALBUMIN 4.5 02/21/2023    PROTTOTAL 7.1 02/21/2023    ALT 34 02/21/2023    AST 27 02/21/2023    ALKPHOS 68 02/21/2023    BILITOTAL 0.5 02/21/2023     OTHER:   Lab Results   Component Value Date    LACT 1.3 11/16/2020    A1C 4.9 11/29/2021    EVER 9.2 02/21/2023    MAG 1.9 12/22/2021    TSH 1.97 06/22/2022    T4 1.01 06/22/2022    T3 114 06/22/2022    CRP 5.0 06/22/2022    SED 8 06/22/2022       Anesthesia Plan    ASA Status:  2   NPO Status:  NPO Appropriate    Anesthesia Type: General.     - Airway: ETT   Induction: Intravenous.   Maintenance: TIVA.        Consents    Anesthesia Plan(s) and associated risks, benefits, and  realistic alternatives discussed. Questions answered and patient/representative(s) expressed understanding.     - Discussed: Risks, Benefits and Alternatives for the PROCEDURE were discussed     - Discussed with:  Patient, Spouse      - Extended Intubation/Ventilatory Support Discussed: No.      - Patient is DNR/DNI Status: No    Use of blood products discussed: Yes.     - Discussed with: Patient.     - Consented: consented to blood products            Reason for refusal: other.     Postoperative Care    Pain management: IV analgesics.   PONV prophylaxis: Ondansetron (or other 5HT-3), Dexamethasone or Solumedrol, Scopolamine patch     Comments:                GUILHERME Vasquez CRNA

## 2023-04-19 NOTE — ANESTHESIA CARE TRANSFER NOTE
Patient: Gwendolyn Dietrich    Procedure: Procedure(s):  LAPAROSCOPY; RIGHT ADNEXECTOMY AND BILATERAL SALPING OOPHORECTOMY, CAUTERIZATION OF ENDOMETRIOSIS       Diagnosis: Adnexal cyst [N94.9]  Diagnosis Additional Information: No value filed.    Anesthesia Type:   General     Note:    Oropharynx: oropharynx clear of all foreign objects  Level of Consciousness: drowsy  Oxygen Supplementation: face mask  Level of Supplemental Oxygen (L/min / FiO2): 8  Independent Airway: airway patency satisfactory and stable    Vital Signs Stable: post-procedure vital signs reviewed and stable  Report to RN Given: handoff report given  Patient transferred to: PACU    Handoff Report: Identifed the Patient, Identified the Reponsible Provider, Reviewed the pertinent medical history, Discussed the surgical course, Reviewed Intra-OP anesthesia mangement and issues during anesthesia, Set expectations for post-procedure period and Allowed opportunity for questions and acknowledgement of understanding      Vitals:  Vitals Value Taken Time   /58 04/19/23 1330   Temp     Pulse 87 04/19/23 1332   Resp 15 04/19/23 1332   SpO2 100 % 04/19/23 1332   Vitals shown include unvalidated device data.    Electronically Signed By: GUILHERME LUO CRNA  April 19, 2023  1:33 PM

## 2023-04-19 NOTE — OR NURSING
Gwendolyn has been discharged to home at 1512 via wheelchair accompanied by FAWN Espazra.    Written discharge instructions were provided to Gwendolyn and Steve.  Prescriptions were escribed.  Patient states their pain is 6/10, which is tolerable per patient, and denies any nausea or dizziness upon discharge.    Patient and adult caring for them verbalize understanding of discharge instructions including no driving until tomorrow and no longer taking narcotic pain medications - no operating mechanical equipment and no making any important decisions.They understand reason for discharge, and necessary follow-up appointments.

## 2023-04-19 NOTE — OP NOTE
[facility] Operative Note    Pre-operative diagnosis: Right adnexal cyst   Post-operative diagnosis: Same  Right hydrosalpinx  Endometriosis Stage 1   Procedure: Diagnostic laparoscopy  Pelvic washings  Bilateral salpingo-oophorectomy  Cauterization of endometriosis   Surgeon: Earnest Brooks MD   Assistant(s): None   Anesthesia: General Endotracheal Anesthesia   Estimated blood loss: less than 50ml   Total IV fluids: (See anesthesia record)   Blood transfusion: No transfusion was given during surgery   Total urine output: (See anesthesia record)   Drains: None   Specimens: Bilateral fallopian tubes and ovaries, pelvic biopsy, pelvic washings.   Findings: Right hydrosalpinx, pelvic endometriosis   Complications: None   Condition: Stable   Procedure details:  After consent was obtained the patient was taken back to the operative suite and placed under general anesthesia.  She was prepped and draped in the usual sterile fashion.  She was in supine position.  A timeout protocol was observed.  A 5 mm infraumbilical incision was made after infiltration with half percent Marcaine with dilute epinephrine.  The abdomen was insufflated with CO2 gas with use of a Veress needle.  A 5 mm Optiview was used to gain entry into the abdominal cavity under direct visualization.  No injuries to abdominal organs were visualized.  2 additional ports were placed on the left side.  One was in the left lower quadrant and 1 in the left periumbilical area.  In similar fashion they were anesthetized with half percent Marcaine, a 5 mm incision made, and 5 mm trocars inserted under direct observation.  The upper abdomen was within normal limits save for one small adhesion of omentum to the anterior abdominal wall.  She was placed in steep Trendelenburg and the pelvis demonstrated endometriosis in the form of chocolate cysts presents particularly in the left ovarian fossa and in the cul-de-sac in the midline.  A 5  cm x 2 cm cystic structure was seen involving the fallopian tube on the right consistent with a hydrosalpinx or paraovarian cyst.  The ovaries themselves appeared within normal limits.  The left tube appeared within normal limits.  The right infundibulopelvic ligament was isolated crossclamped and sealed with LigaSure device.  The remainder of the tube and ovary on the right were dissected free of the pelvic sidewall.  Adequate hemostasis was secured with cautery.  The right tube and ovary including the cystic mass were placed in the cul-de-sac.  In like manner the left tube and ovary were deviated to the midline.  The IP ligament was isolated sealed and divided.  The remainder of the ovary was dissected off of the pelvic sidewall.  Cautery was used to secure hemostasis.  We then placed both tubes and ovaries including the cystic mass into a Endopouch through a 1012 port that had been enlarged and placed in the and an original 5 mm infraumbilical site.  The incision was slightly enlarged and the Endopouch removed intact.  The cyst did not rupture during the removal.  Prior to beginning the dissection a pelvic washing had been obtained in the event that occult malignancy was encountered.  The remainder of the visible endometriotic implants were cauterized with monopolar cautery on the LigaSure advance device.  A small white bit of tissue that resembled ovary was noted on her supracervical scar and was biopsied out with a grasper. Cautery was used for hemostasis. The abdomen was copiously irrigated and cleared of all clots and debris.  No further bleeding was noted.  The infraumbilical incision was initially closed with a Julio-Zahida device and then reinforced with an additional 0 Vicryl on a UR needle.  The remaining skin incisions were closed with 3-0 subcu Monocryl and reinforced with adhesive. No complications occurred. Bleeding was less than 50 ml.    Pathology included both tubes and ovaries, and an  additional biopsy of scar vs ovarian tissue noted in the cul-de-sac, and pelvic washings.    She was awakened and taken to PACU in stable condition.

## 2023-04-19 NOTE — OR NURSING
.PACU Transfer Note    Gwendolyn Dietrich was transferred to  731 via bed.  Equipment used for transport:  bed.  Accompanied by:  Daxa AUGUSTINE   Prescriptions were: sent to [facility]    PACU Respiratory Event Documentation     1) Episodes of Apnea greater than or equal to 10 seconds: no    2) Bradypnea - less than 8 breaths per minute: no    3) Pain score on 0 to 10 scale: 6/10-states this is tolerable     4) Pain-sedation mismatch (yes or no): no    5) Repeated 02 desaturation less than 90% (yes or no): no    Anesthesia notified? (yes or no): no    Any of the above events occuring repeatedly in separate 30 minute intervals may be considered recurrent PACU respiratory events.    Patient stable and meets phase 1 discharge criteria for transport from PACU.    Report given to FAWN Brown @ [id number]

## 2023-04-19 NOTE — ANESTHESIA PROCEDURE NOTES
Airway       Patient location during procedure: OR       Procedure Start/Stop Times: 4/19/2023 12:07 PM  Staff -        CRNA: Martínez Nieves APRN CRNA       Performed By: CRNA  Consent for Airway        Urgency: elective  Indications and Patient Condition       Indications for airway management: antonio-procedural       Induction type:intravenous       Mask difficulty assessment: 2 - vent by mask + OA or adjuvant +/- NMBA    Final Airway Details       Final airway type: endotracheal airway       Successful airway: ETT - single  Endotracheal Airway Details        ETT size (mm): 7.0       Cuffed: yes       Cuff volume (mL): 10       Successful intubation technique: direct laryngoscopy       DL Blade Type: Vega 2       Grade View of Cords: 3 (With cricoid pressure)       Adjucts: stylet       Position: Right       Measured from: gums/teeth       Secured at (cm): 22       Bite block used: None    Post intubation assessment        Placement verified by: capnometry, equal breath sounds and chest rise        Number of attempts at approach: 1       Number of other approaches attempted: 0       Secured with: silk tape       Ease of procedure: easy (Able to visualize very bottom of VC with strong cricoid pressure)       Dentition: Intact and Unchanged    Medication(s) Administered   Medication Administration Time: 4/19/2023 12:07 PM    Additional Comments       Easy BMV.  Prominent front upper teeth.  Grade 3+ view with cricoid pressure and direct laryngoscopy with Vega 2.  Able to intubate the trachea without direct visualization of the VC.  BBS and positive end-tital CO2 present.

## 2023-04-19 NOTE — ANESTHESIA POSTPROCEDURE EVALUATION
Patient: Gwendolyn Dietrich    Procedure: Procedure(s):  LAPAROSCOPY; RIGHT ADNEXECTOMY AND BILATERAL SALPING OOPHORECTOMY, CAUTERIZATION OF ENDOMETRIOSIS       Anesthesia Type:  General    Note:  Disposition: Outpatient   Postop Pain Control: Uneventful            Sign Out: Well controlled pain   PONV: No   Neuro/Psych: Uneventful            Sign Out: Acceptable/Baseline neuro status   Airway/Respiratory: Uneventful            Sign Out: Acceptable/Baseline resp. status   CV/Hemodynamics: Uneventful            Sign Out: Acceptable CV status; No obvious hypovolemia; No obvious fluid overload   Other NRE: NONE   DID A NON-ROUTINE EVENT OCCUR? No           Last vitals:  Vitals Value Taken Time   /80 04/19/23 1355   Temp 98.1  F (36.7  C) 04/19/23 1350   Pulse 89 04/19/23 1355   Resp 12 04/19/23 1355   SpO2 92 % 04/19/23 1356   Vitals shown include unvalidated device data.    Electronically Signed By: GUILHERME Vasquez CRNA  April 19, 2023  3:17 PM

## 2023-04-19 NOTE — DISCHARGE INSTRUCTIONS
[facility]  Adult Discharge Orders & Instructions      For 24 hours after surgery:  Get plenty of rest.  A responsible adult must stay with you for at least 24 hours after you leave the hospital.   You may feel lightheaded.  IF so, sit for a few minutes before standing.  Have someone help you get up.   You may have a slight fever. Call the doctor if your fever is over 101 F (38.3 C) (taken under the tongue) or lasts longer than 24 hours.  You may have a dry mouth, a sore throat, muscle aches or trouble sleeping.  These should go away after 24 hours.  Do not make important or legal decisions.  6.   Do not drive or use heavy equipment.  If you have weakness or tingling, don't drive or use heavy equipment until this feeling goes away.                                                                                                                                                                         To contact a doctor, call    [phone]______________

## 2023-04-20 RX ORDER — EPINEPHRINE 0.3 MG/.3ML
0.3 INJECTION SUBCUTANEOUS PRN
Qty: 2 EACH | Refills: 2 | Status: SHIPPED | OUTPATIENT
Start: 2023-04-20

## 2023-04-20 NOTE — TELEPHONE ENCOUNTER
Patient sent Rx request for the following:    EPINEPHrine (ANY BX GENERIC EQUIV) 0.3 MG/0.3ML injection 2-pack  Last Prescription Date:   9/13/21  Last Fill Qty/Refills:       2  , R-  1  Last Office Visit:              2/21/23   Future Office visit:           None   Ashwini Hyman RN on 4/20/2023 at 9:06 AM

## 2023-04-25 LAB — PATH REPORT.FINAL DX SPEC: NORMAL

## 2023-04-27 ENCOUNTER — OFFICE VISIT (OUTPATIENT)
Dept: OBGYN | Facility: OTHER | Age: 43
End: 2023-04-27
Attending: OBSTETRICS & GYNECOLOGY
Payer: COMMERCIAL

## 2023-04-27 VITALS
BODY MASS INDEX: 42.27 KG/M2 | HEART RATE: 80 BPM | DIASTOLIC BLOOD PRESSURE: 88 MMHG | WEIGHT: 254 LBS | SYSTOLIC BLOOD PRESSURE: 134 MMHG

## 2023-04-27 DIAGNOSIS — N80.9 ENDOMETRIOSIS: ICD-10-CM

## 2023-04-27 DIAGNOSIS — Z98.890 POSTOPERATIVE STATE: Primary | ICD-10-CM

## 2023-04-27 DIAGNOSIS — Z90.722 S/P BSO (BILATERAL SALPINGO-OOPHORECTOMY): ICD-10-CM

## 2023-04-27 PROCEDURE — 99024 POSTOP FOLLOW-UP VISIT: CPT | Performed by: OBSTETRICS & GYNECOLOGY

## 2023-04-27 ASSESSMENT — PAIN SCALES - GENERAL: PAINLEVEL: NO PAIN (0)

## 2023-04-27 NOTE — PROGRESS NOTES
Gwendolyn Dietrich presents todayfor a postop checkup at 2 week(s) after lap BSO with right hydrosalpinx and Stage 1 endoemtriosis    S: Bowels and bladder are functioning normally, no abnormal bleeding or pain. No concerns about the incision(s).    Current Outpatient Medications   Medication     EPINEPHrine (ANY BX GENERIC EQUIV) 0.3 MG/0.3ML injection 2-pack     fexofenadine (ALLEGRA) 180 MG tablet     ibuprofen (ADVIL/MOTRIN) 200 MG tablet     ORDER FOR ALLERGEN IMMUNOTHERAPY     estradiol (VIVELLE-DOT) 0.05 MG/24HR bi-weekly patch     magnesium oxide 200 MG TABS     No current facility-administered medications for this visit.     Allergies   Allergen Reactions     Seasonal Allergies Anaphylaxis     Birch, Dog and Cats - has Epi-pen for this reason     Codeine GI Disturbance     Past Medical History:   Diagnosis Date     Obesity 5/8/2013     Past Surgical History:   Procedure Laterality Date     BACK SURGERY  01/01/2005     BIOPSY OF SKIN LESION       CHOLECYSTECTOMY  01/01/2011     COLONOSCOPY N/A 06/08/2022    serrated adenoma, follow uo 6/8/2023     HYSTERECTOMY, PAP STILL INDICATED  03/27/2012    CERVIX STILL REMAINS.     HYSTERECTOMY, SUPRACERVICAL LAPAROSCOPIC N/A 03/27/2012    Cervix and bilateral ovaries remain. Performed due to menorrhagia, precancerous cells     LAPAROSCOPY DIAGNOSTIC (GYN) Bilateral 4/19/2023    Procedure: LAPAROSCOPY; RIGHT ADNEXECTOMY AND BILATERAL SALPING OOPHORECTOMY, CAUTERIZATION OF ENDOMETRIOSIS;  Surgeon: Earnest Brooks MD;  Location: [facility] OR     ORTHOPEDIC SURGERY  01/01/2005    back surgery     TONSILLECTOMY       TUBAL LIGATION  01/01/2007       COMPLETE REVIEW OF SYSTEMS: Neg except as above        O: /88 (BP Location: Right arm, Patient Position: Sitting, Cuff Size: Adult Large)   Pulse 80   Wt 115.2 kg (254 lb)   LMP  (LMP Unknown)   BMI 42.27 kg/m   Body mass index is 42.27 kg/m .    EXAM:  NAD  Incisions CDI      I/P:  Stable postop    Pathology  reviewed  Recheck PRN  Start vivelle for hot flushes prn in 10 weeks or with bothersome hot flushes.    Earnest Brooks MD FACOG  9:12 AM 4/27/2023

## 2023-04-27 NOTE — NURSING NOTE
Chief Complaint   Patient presents with     Surgical Followup     2 week post op        Medication Reconciliation: complete   Eyes have been blood shot since surgery     Kajal Styles LPN........................4/27/2023  8:50 AM

## 2023-05-01 ENCOUNTER — ALLIED HEALTH/NURSE VISIT (OUTPATIENT)
Dept: ALLERGY | Facility: OTHER | Age: 43
End: 2023-05-01
Attending: PHYSICIAN ASSISTANT
Payer: COMMERCIAL

## 2023-05-01 DIAGNOSIS — J30.89 PERENNIAL ALLERGIC RHINITIS: Primary | ICD-10-CM

## 2023-05-01 PROCEDURE — 95117 IMMUNOTHERAPY INJECTIONS: CPT

## 2023-05-01 NOTE — PROGRESS NOTES
Prior to injection verified pt identity using pt name and date of birth.    Allergy injection/s given and charted on paper allergy flow sheet.  Patient left AMA, signing form, not staying for the observation period.    Dominik Solorio RN on 5/1/2023 at 8:07 AM

## 2023-05-05 ENCOUNTER — OFFICE VISIT (OUTPATIENT)
Dept: FAMILY MEDICINE | Facility: OTHER | Age: 43
End: 2023-05-05
Attending: FAMILY MEDICINE
Payer: COMMERCIAL

## 2023-05-05 VITALS
RESPIRATION RATE: 17 BRPM | OXYGEN SATURATION: 98 % | DIASTOLIC BLOOD PRESSURE: 82 MMHG | TEMPERATURE: 98.2 F | HEART RATE: 78 BPM | WEIGHT: 258.8 LBS | SYSTOLIC BLOOD PRESSURE: 136 MMHG | BODY MASS INDEX: 43.07 KG/M2

## 2023-05-05 DIAGNOSIS — R23.8 SKIN IRRITATION: Primary | ICD-10-CM

## 2023-05-05 DIAGNOSIS — D12.6 SERRATED ADENOMA OF COLON: ICD-10-CM

## 2023-05-05 DIAGNOSIS — Z12.11 SPECIAL SCREENING FOR MALIGNANT NEOPLASMS, COLON: Primary | ICD-10-CM

## 2023-05-05 PROCEDURE — 99213 OFFICE O/P EST LOW 20 MIN: CPT | Performed by: FAMILY MEDICINE

## 2023-05-05 RX ORDER — CEPHALEXIN 500 MG/1
500 CAPSULE ORAL 2 TIMES DAILY
Qty: 14 CAPSULE | Refills: 0 | Status: SHIPPED | OUTPATIENT
Start: 2023-05-05 | End: 2023-05-12

## 2023-05-05 ASSESSMENT — ENCOUNTER SYMPTOMS
MYALGIAS: 0
SEIZURES: 0
NUMBNESS: 0
ARTHRALGIAS: 0
SHORTNESS OF BREATH: 0
STRIDOR: 0
FEVER: 0
ABDOMINAL PAIN: 0
LIGHT-HEADEDNESS: 0
HEADACHES: 0
PALPITATIONS: 0
FATIGUE: 0
RHINORRHEA: 0
ABDOMINAL DISTENTION: 0
COUGH: 0
WHEEZING: 0

## 2023-05-05 ASSESSMENT — PAIN SCALES - GENERAL: PAINLEVEL: NO PAIN (0)

## 2023-05-05 NOTE — PROGRESS NOTES
"  Assessment & Plan     (R23.8) Skin irritation  (primary encounter diagnosis)  Comment: Patient presents to clinic after a bilateral salpingo oophorectomy performed on 4/19 for concern of trocar site infection X2.  Patient noticed erythema with irritation starting about 3 to 4 days ago without fevers or pustular drainage.  On exam, surgical sites look mildly erythematous without drainage or warmth.  Appearance most likely caused by local immune response/phagocytosis of stitches placed in surgery.  Considering patient's history of surgical site infection, discussed prescribing Keflex 500 mg twice daily for 7 days that patient can start if signs of infection become prevalent.  Patient encouraged not to take these antibiotics unless signs of infection manifest.  Plan: cephALEXin (KEFLEX) 500 MG capsule          (D12.6) Serrated adenoma of colon  Comment: History of serrated adenoma found on colonoscopy 6/8/2022 and recommended follow-up June 2023.  Plan: Colonoscopy Screening  Referral                     BMI:   Estimated body mass index is 43.07 kg/m  as calculated from the following:    Height as of 6/22/22: 1.651 m (5' 5\").    Weight as of this encounter: 117.4 kg (258 lb 12.8 oz).   Weight management plan: Discussed healthy diet and exercise guidelines        No follow-ups on file.    Patient was seen and examined by me as well as Chucho Winters, MS3    Alfonso Alvarez MD  [facility]    Subjective   Gwendolyn is a 42 year old, presenting for the following health issues:  RECHECK        5/5/2023     8:40 AM   Additional Questions   Roomed by ASHLYN Dover   Accompanied by Self         5/5/2023     8:40 AM   Patient Reported Additional Medications   Patient reports taking the following new medications N/A     Patient presents to clinic for evaluation of possible trocar site infection after a bilateral salpingo-oophorectomy on April 19, 2023.  Patient noticed increased redness, puffiness, " irritation starting about 3 to 4 days ago.  She has not had fevers or used any topical medications for treatment.  Patient has a history of local surgical site infections after past procedures and wanted to be evaluated due to concern about possible infection.    History of Present Illness       Reason for visit:  Post Op follow up    She eats 2-3 servings of fruits and vegetables daily.She consumes 1 sweetened beverage(s) daily.She exercises with enough effort to increase her heart rate 9 or less minutes per day.  She exercises with enough effort to increase her heart rate 3 or less days per week.   She is taking medications regularly.               Review of Systems   Constitutional: Negative for fatigue and fever.   HENT: Negative for postnasal drip and rhinorrhea.    Respiratory: Negative for cough, shortness of breath, wheezing and stridor.    Cardiovascular: Negative for chest pain, palpitations and peripheral edema.   Gastrointestinal: Negative for abdominal distention and abdominal pain.   Genitourinary: Negative for urgency.   Musculoskeletal: Negative for arthralgias and myalgias.   Skin:        Skin irritation around trocar sites.   Neurological: Negative for seizures, light-headedness, numbness and headaches.            Objective    /82   Pulse 78   Temp 98.2  F (36.8  C) (Tympanic)   Resp 17   Wt 117.4 kg (258 lb 12.8 oz)   LMP  (LMP Unknown)   SpO2 98%   BMI 43.07 kg/m    Body mass index is 43.07 kg/m .  Physical Exam  Cardiovascular:      Rate and Rhythm: Normal rate and regular rhythm.      Pulses: Normal pulses.      Heart sounds: Normal heart sounds.   Pulmonary:      Effort: Pulmonary effort is normal.      Breath sounds: Normal breath sounds.   Musculoskeletal:         General: Normal range of motion.   Skin:     Findings: Erythema present.      Comments: Mild erythema around navel and left mid quadrant of abdomen trocar sites without drainage.                      Chucho Winters on  5/5/2023 at 9:25 AM

## 2023-05-05 NOTE — NURSING NOTE
"Chief Complaint   Patient presents with     RECHECK       Initial /82   Pulse 78   Temp 98.2  F (36.8  C) (Tympanic)   Resp 17   Wt 117.4 kg (258 lb 12.8 oz)   LMP  (LMP Unknown)   SpO2 98%   BMI 43.07 kg/m   Estimated body mass index is 43.07 kg/m  as calculated from the following:    Height as of 6/22/22: 1.651 m (5' 5\").    Weight as of this encounter: 117.4 kg (258 lb 12.8 oz).  Medication Reconciliation: complete    FOOD SECURITY SCREENING QUESTIONS  Hunger Vital Signs:  Within the past 12 months we worried whether our food would run out before we got money to buy more. Never  Within the past 12 months the food we bought just didn't last and we didn't have money to get more. Never  Jazzmine Miller LPN 5/5/2023 8:44 AM      "

## 2023-05-05 NOTE — TELEPHONE ENCOUNTER
Screening Questions for the Scheduling of Screening Colonoscopies   (If Colonoscopy is diagnostic, Provider should review the chart before scheduling.)  Are you younger than 50 or older than 80?  YES  Do you take aspirin or fish oil?  NO (if yes, tell patient to stop 1 week prior to Colonoscopy)  Do you take warfarin (Coumadin), clopidogrel (Plavix), apixaban (Eliquis), dabigatram (Pradaxa), rivaroxaban (Xarelto) or any blood thinner? NO  Do you use oxygen at home?  NO  Do you have kidney disease? NO  Are you on dialysis? NO  Have you had a stroke or heart attack in the last year? NO  Have you had a stent in your heart or any blood vessel in the last year? NO  Have you had a transplant of any organ? NO  Have you had a colonoscopy or upper endoscopy (EGD) before? YES         When?  2022  Date of scheduled Colonoscopy. 09/13/2023  Provider [facility]

## 2023-05-09 RX ORDER — BISACODYL 5 MG/1
TABLET, DELAYED RELEASE ORAL
Qty: 2 TABLET | Refills: 0 | Status: ON HOLD | OUTPATIENT
Start: 2023-05-09 | End: 2023-09-13

## 2023-05-09 RX ORDER — POLYETHYLENE GLYCOL 3350, SODIUM CHLORIDE, SODIUM BICARBONATE, POTASSIUM CHLORIDE 420; 11.2; 5.72; 1.48 G/4L; G/4L; G/4L; G/4L
4000 POWDER, FOR SOLUTION ORAL ONCE
Qty: 4000 ML | Refills: 0 | Status: SHIPPED | OUTPATIENT
Start: 2023-05-09 | End: 2023-05-09

## 2023-05-15 ENCOUNTER — ALLIED HEALTH/NURSE VISIT (OUTPATIENT)
Dept: ALLERGY | Facility: OTHER | Age: 43
End: 2023-05-15
Attending: PHYSICIAN ASSISTANT
Payer: COMMERCIAL

## 2023-05-15 DIAGNOSIS — J30.89 PERENNIAL ALLERGIC RHINITIS: Primary | ICD-10-CM

## 2023-05-15 PROCEDURE — 95117 IMMUNOTHERAPY INJECTIONS: CPT

## 2023-05-15 NOTE — PROGRESS NOTES
Prior to injection verified pt identity using pt name and date of birth.    Allergy injection/s given and charted on paper allergy flow sheet.  Patient left AMA, signing form, not staying for the observation period.    Ena Dejesus RN on 5/15/2023 at 8:21 AM

## 2023-06-19 ENCOUNTER — ALLIED HEALTH/NURSE VISIT (OUTPATIENT)
Dept: ALLERGY | Facility: OTHER | Age: 43
End: 2023-06-19
Attending: PHYSICIAN ASSISTANT
Payer: COMMERCIAL

## 2023-06-19 DIAGNOSIS — J30.9 ALLERGIC RHINITIS: Primary | ICD-10-CM

## 2023-06-19 PROCEDURE — 95117 IMMUNOTHERAPY INJECTIONS: CPT

## 2023-06-19 NOTE — PROGRESS NOTES
Allergy injection/s given and charted on paper allergy flow sheet.  Patient experienced unknown reaction.    Due to injection administration, patient instructed to remain in clinic for 15 minutes  afterwards, and to report any adverse reaction to me immediately.  Pt signed out AMA [id number].  TETO ODNG LPN    
denies

## 2023-06-26 ENCOUNTER — ALLIED HEALTH/NURSE VISIT (OUTPATIENT)
Dept: ALLERGY | Facility: OTHER | Age: 43
End: 2023-06-26
Attending: PHYSICIAN ASSISTANT
Payer: COMMERCIAL

## 2023-06-26 DIAGNOSIS — J30.9 ALLERGIC RHINITIS, UNSPECIFIED SEASONALITY, UNSPECIFIED TRIGGER: Primary | ICD-10-CM

## 2023-06-26 DIAGNOSIS — J30.89 PERENNIAL ALLERGIC RHINITIS: ICD-10-CM

## 2023-06-26 PROCEDURE — 95165 ANTIGEN THERAPY SERVICES: CPT | Performed by: PHYSICIAN ASSISTANT

## 2023-06-28 ENCOUNTER — ALLIED HEALTH/NURSE VISIT (OUTPATIENT)
Dept: ALLERGY | Facility: OTHER | Age: 43
End: 2023-06-28
Attending: PHYSICIAN ASSISTANT
Payer: COMMERCIAL

## 2023-06-28 DIAGNOSIS — J30.9 ALLERGIC RHINITIS: Primary | ICD-10-CM

## 2023-06-28 PROCEDURE — 95117 IMMUNOTHERAPY INJECTIONS: CPT

## 2023-06-28 NOTE — PROGRESS NOTES
Clinic Administered Medication Documentation      Injectable Medication Documentation    Is there an active order (written within the past 365 days, with administrations remaining, not ) in the chart? Yes.     Patient was given Allergy injection today.  SVT was completed for vial 1 and vial 2.  Patient passed the SVT 9mm.  OK to administer allergy injection.  Injection of 0.35 mls given in both left and right upper arms.. Prior to medication administration, verified patient's identity using patient s name and date of birth. Please see MAR and medication order for additional information. Patient instructed to remain in clinic for 15 minutes and report any adverse reaction to staff immediately but patient declined. Patient signed AMA.    Vial/Syringe: Single dose vial. Was entire vial of medication used? Yes  Was this medication supplied by the patient? No  Is this a medication the patient will need to receive again? Yes. Verified that the patient has refills remaining in their prescription.

## 2023-06-28 NOTE — PROGRESS NOTES
Allergy serum is mixed today at [facility],  into  2  (5 ml)  multi dose vial/vials.    Allergens included were:    Ragweed  0.2 ml of dilution # 1  Pigweed  0.2 ml of dilution # 1  Mugwort 0.2 ml of dilution # 0  Kochia  0.2 ml of dilution # 0  Russian Thistle 0.2 ml of dilution # 1  Alfonso Grass 0.2 ml of dilution # 1  Birch mix 0.2 ml of dilution # 1  Maple Mix 0.2 of dilution # 1  Elm Mix 0.2 ml of dilution # 0  Oak Mix 0.2 ml of dilution # 0  Toribio Mix 0.2 ml of dilution # 0  Pine Mix 0.2 ml of dilution # 0  Eastern [place] 0.2 ml of dilution # 1  Black [place] 0.2 ml of dilution # 1  Aspen 0.2 ml of dilution # 0  Red [place] 0.2 ml of dilution # 0    Alternaria 0.2 ml of dilution # 1  Aspergillus 0.2 ml of dilution # 1  Hormodendrum 0.2 ml of dilution # 1  Helminthosporium 0.2 ml of dilution # 1  Penicillium 0.2 ml of dilution # 1  Epicoccum 0.2 ml of dilution # 1  Fusarium 0.2 ml of dilution # 1  Mucor 0.2 ml of dilution # 0  Grain Smut 0.2 ml of dilution # 0  Grass Smut 0.2 ml of dilution # 0  Cat 0.2 ml of dilution # 1  Dog 0.2 ml of dilution # 1  Feather Mix 0.2 ml of dilution # 0  Dust Mite Mix 0.2 ml of dilution # 1  Horse 0.2 ml of dilution # 0    Pati Bradley PA-C

## 2023-06-30 ENCOUNTER — TELEPHONE (OUTPATIENT)
Dept: OTOLARYNGOLOGY | Facility: OTHER | Age: 43
End: 2023-06-30

## 2023-06-30 NOTE — TELEPHONE ENCOUNTER
Message was returned to Gwendolyn after she left a message on voicemail.  She was inquiring about why she and her son's allergy shot appointments were cancelled for next week.  She is concerned that the allergy injections are getting behind and for them to come every week to build up to the maintenance dose is upsetting.  She wants concrete answers about going forward.  This writer explained that the serums needed to be re-mixed since she was here last.  The next date for the mixing is Friday 7/7/23.  She and her son should be able to receive their next injections the week of 7/10/23.  Gwendolyn reports they have their appointment already scheduled out for the year but want to know how many doses before they are back to their maintenance dosing.  After reviewing the record it looks like it would be 3 injections until they are back to maintenance as long as the SVT are passed.

## 2023-07-07 ENCOUNTER — ALLIED HEALTH/NURSE VISIT (OUTPATIENT)
Dept: ALLERGY | Facility: OTHER | Age: 43
End: 2023-07-07
Payer: COMMERCIAL

## 2023-07-07 DIAGNOSIS — J30.89 PERENNIAL ALLERGIC RHINITIS: Primary | ICD-10-CM

## 2023-07-07 PROCEDURE — 95165 ANTIGEN THERAPY SERVICES: CPT | Performed by: PHYSICIAN ASSISTANT

## 2023-07-07 NOTE — PROGRESS NOTES
Allergy serum is mixed today at [facility],  into  2  (5 ml)  multi dose vial/vials.    Allergens included were:    Ragweed  0.2 ml of dilution # 1  Pigweed  0.2 ml of dilution # 1  Mugwort 0.2 ml of dilution # 0  Kochia  0.2 ml of dilution # 0  Russian Thistle 0.2 ml of dilution # 1  Alfonso Grass 0.2 ml of dilution # 1  Birch mix 0.2 ml of dilution # 1  Maple Mix 0.2 of dilution # 1  Elm Mix 0.2 ml of dilution # 0  Oak Mix 0.2 ml of dilution # 0  Toribio Mix 0.2 ml of dilution # 0  Pine Mix 0.2 ml of dilution # 0  Eastern [place] 0.2 ml of dilution # 1  Black [place] 0.2 ml of dilution # 1  Aspen 0.2 ml of dilution # 0  Red [place] 0.2 ml of dilution # 0    Alternaria 0.2 ml of dilution # 1  Aspergillus 0.2 ml of dilution # 1  Hormodendrum 0.2 ml of dilution # 1  Helminthosporium 0.2 ml of dilution # 1  Penicillium 0.2 ml of dilution # 1  Epicoccum 0.2 ml of dilution # 1  Fusarium 0.2 ml of dilution # 1  Mucor 0.2 ml of dilution # 0  Grain Smut 0.2 ml of dilution # 0  Grass Smut 0.2 ml of dilution # 0  Cat 0.2 ml of dilution # 1  Dog 0.2 ml of dilution # 1  Feather Mix 0.2 ml of dilution # 0  Dust Mite Mix 0.2 ml of dilution # 1  Horse 0.2 ml of dilution # 0    Ena Dejesus RN on 7/7/2023 at 4:50 PM

## 2023-07-19 ENCOUNTER — ALLIED HEALTH/NURSE VISIT (OUTPATIENT)
Dept: ALLERGY | Facility: OTHER | Age: 43
End: 2023-07-19
Attending: PHYSICIAN ASSISTANT
Payer: COMMERCIAL

## 2023-07-19 DIAGNOSIS — J30.9 ALLERGIC RHINITIS: Primary | ICD-10-CM

## 2023-07-19 PROCEDURE — 95117 IMMUNOTHERAPY INJECTIONS: CPT

## 2023-07-19 NOTE — PROGRESS NOTES
Allergy injection/s given and charted on paper allergy flow sheet.  Patient experienced unknown reaction. Patient left AMA and signed form at 1645    Due to injection administration, patient instructed to remain in clinic for 15 minutes  afterwards, and to report any adverse reaction to me immediately.  Patient signed out AMA form at 1645.    Prior to injection patient identity verified using name and date of birth.     SVT done on left arm, measuring 11 MM. Passed. From red vial 1.  SVT done on right arm, measuring 9 MM, Passed. From red vial 2.    Documented on paper flow sheet.     Allergy injection given and charted on paper allergy flow sheet. Patient signed out AMA at 1645.    Clinic Administered Medication Documentation      Injectable Medication Documentation    Is there an active order (written within the past 365 days, with administrations remaining, not ) in the chart? Yes.     Patient was given allergy injection of 0.35 ml on the left arm from red vial 1 given in the left arm and allergy injection of 0.35 ml from red vial 2 given in the right arm, dose per discussion with Marleny ZAFAR NP and OPAL Nick. Prior to medication administration, verified patient's identity using patient s name and date of birth. Please see MAR and medication order for additional information. Patient instructed to remain in clinic for 15 minutes, report any adverse reaction to staff immediately and remain in clinic for 15 minutes and report any adverse reaction to staff immediately but patient declined. Patient signed out AMA at 1645.    Vial/Syringe: Single dose vial. Was entire vial of medication used? Yes  Was this medication supplied by the patient? No  Is this a medication the patient will need to receive again? Yes. Verified that the patient has refills remaining in their prescription.

## 2023-07-20 ENCOUNTER — HOSPITAL ENCOUNTER (EMERGENCY)
Facility: HOSPITAL | Age: 43
Discharge: HOME | End: 2023-07-20
Attending: STUDENT IN AN ORGANIZED HEALTH CARE EDUCATION/TRAINING PROGRAM | Admitting: STUDENT IN AN ORGANIZED HEALTH CARE EDUCATION/TRAINING PROGRAM
Payer: COMMERCIAL

## 2023-07-20 VITALS
TEMPERATURE: 98.2 F | DIASTOLIC BLOOD PRESSURE: 92 MMHG | RESPIRATION RATE: 15 BRPM | SYSTOLIC BLOOD PRESSURE: 145 MMHG | OXYGEN SATURATION: 96 % | HEART RATE: 75 BPM

## 2023-07-20 DIAGNOSIS — L29.9 ITCHING: ICD-10-CM

## 2023-07-20 DIAGNOSIS — R09.A2 GLOBUS SENSATION: ICD-10-CM

## 2023-07-20 DIAGNOSIS — T78.40XA ALLERGIC REACTION, INITIAL ENCOUNTER: ICD-10-CM

## 2023-07-20 PROCEDURE — 96372 THER/PROPH/DIAG INJ SC/IM: CPT

## 2023-07-20 PROCEDURE — 99285 EMERGENCY DEPT VISIT HI MDM: CPT

## 2023-07-20 PROCEDURE — 99283 EMERGENCY DEPT VISIT LOW MDM: CPT | Performed by: STUDENT IN AN ORGANIZED HEALTH CARE EDUCATION/TRAINING PROGRAM

## 2023-07-20 PROCEDURE — 250N000013 HC RX MED GY IP 250 OP 250 PS 637: Performed by: STUDENT IN AN ORGANIZED HEALTH CARE EDUCATION/TRAINING PROGRAM

## 2023-07-20 PROCEDURE — 250N000012 HC RX MED GY IP 250 OP 636 PS 637: Performed by: STUDENT IN AN ORGANIZED HEALTH CARE EDUCATION/TRAINING PROGRAM

## 2023-07-20 PROCEDURE — 250N000009 HC RX 250: Performed by: STUDENT IN AN ORGANIZED HEALTH CARE EDUCATION/TRAINING PROGRAM

## 2023-07-20 RX ORDER — DIPHENHYDRAMINE HCL 25 MG
50 CAPSULE ORAL ONCE
Status: COMPLETED | OUTPATIENT
Start: 2023-07-20 | End: 2023-07-20

## 2023-07-20 RX ORDER — FAMOTIDINE 20 MG/1
20 TABLET, FILM COATED ORAL 2 TIMES DAILY
Status: DISCONTINUED | OUTPATIENT
Start: 2023-07-20 | End: 2023-07-20 | Stop reason: HOSPADM

## 2023-07-20 RX ADMIN — DIPHENHYDRAMINE HYDROCHLORIDE 50 MG: 25 CAPSULE ORAL at 10:42

## 2023-07-20 RX ADMIN — DEXAMETHASONE 10 MG: 2 TABLET ORAL at 10:41

## 2023-07-20 RX ADMIN — EPINEPHRINE 0.3 MG: 1 INJECTION INTRAMUSCULAR; INTRAVENOUS; SUBCUTANEOUS at 10:42

## 2023-07-20 RX ADMIN — FAMOTIDINE 20 MG: 20 TABLET, FILM COATED ORAL at 10:42

## 2023-07-20 ASSESSMENT — ACTIVITIES OF DAILY LIVING (ADL)
ADLS_ACUITY_SCORE: 35
ADLS_ACUITY_SCORE: 35

## 2023-07-20 NOTE — Clinical Note
Aure Dietrich accompanied Gwendolyn Dietrich to the emergency department on 7/20/2023. They may return to work on 07/21/2023.      If you have any questions or concerns, please don't hesitate to call.      Prince Rowland MD

## 2023-07-20 NOTE — ED TRIAGE NOTES
Pt presents with c/o having an allergic reaction to an unknown irritant, Pt did receive allergy shots yesterday, Pt states she feels like an ice cube is in her throat.

## 2023-07-20 NOTE — ED PROVIDER NOTES
"  History     Chief Complaint   Patient presents with     Allergic Reaction     HPI  Gwendolyn Dietrcih is a 42 year old female with past medical history of anaphylaxis, obesity, endometriosis, presenting with concern for allergic reaction.  She had allergy shots yesterday and felt reasonably well yesterday.  This morning she woke up with a initially scratchy throat and whole body itching.  Throughout the morning her symptoms have progressed and her throat feels tight like she is having difficulty breathing.  She has no rash but feels itchy everywhere.  She feels \"out of it.\"  She states that this feels like previous anaphylaxis.  She has no nausea/vomiting/diarrhea.  No fevers or cough recently.  No lower extremity edema.  No throat pain previously, no nasal congestion, no other acute concerns.    Allergies:  Allergies   Allergen Reactions     Seasonal Allergies Anaphylaxis     Birch, Dog and Cats - has Epi-pen for this reason     Codeine GI Disturbance       Problem List:    Patient Active Problem List    Diagnosis Date Noted     Endometriosis 04/27/2023     Priority: Medium     S/P BSO (bilateral salpingo-oophorectomy) 04/27/2023     Priority: Medium     Vitiligo 06/22/2022     Priority: Medium     Morbid obesity (H) 05/25/2021     Priority: Medium     Dyslipidemia 01/23/2018     Priority: Medium     Migraine headache 01/23/2018     Priority: Medium     Overview:   Recurrent migraine and tension headaches       ACP (advance care planning) 11/01/2016     Priority: Medium     Advance Care Planning 11/1/2016: ACP Review of Chart / Resources Provided:  Reviewed chart for advance care plan.  Gwendolyn Dietrich has no plan or code status on file. Discussed available resources and provided with information. Confirmed code status reflects current choices pending further ACP discussions.  Confirmed/documented legally designated decision makers.  Added by MARAH CALDERON             Pain in joint 12/10/2013     Priority: " Medium     Obesity 05/08/2013     Priority: Medium     History of hysterectomy, supracervical 03/27/2012     Priority: Medium     Still needs to have PAP/HPV screening.        Hereditary and idiopathic peripheral neuropathy 09/09/2011     Priority: Medium        Past Medical History:    Past Medical History:   Diagnosis Date     Obesity 5/8/2013       Past Surgical History:    Past Surgical History:   Procedure Laterality Date     BACK SURGERY  01/01/2005     BIOPSY OF SKIN LESION       CHOLECYSTECTOMY  01/01/2011     COLONOSCOPY N/A 06/08/2022    serrated adenoma, follow uo 6/8/2023     HYSTERECTOMY, PAP STILL INDICATED  03/27/2012    CERVIX STILL REMAINS.     HYSTERECTOMY, SUPRACERVICAL LAPAROSCOPIC N/A 03/27/2012    Cervix and bilateral ovaries remain. Performed due to menorrhagia, precancerous cells     LAPAROSCOPY DIAGNOSTIC (GYN) Bilateral 4/19/2023    Procedure: LAPAROSCOPY; RIGHT ADNEXECTOMY AND BILATERAL SALPING OOPHORECTOMY, CAUTERIZATION OF ENDOMETRIOSIS;  Surgeon: Earnest Brooks MD;  Location: [facility] OR     ORTHOPEDIC SURGERY  01/01/2005    back surgery     TONSILLECTOMY       TUBAL LIGATION  01/01/2007       Family History:    Family History   Adopted: Yes   Problem Relation Age of Onset     Lipids Mother      Hypertension Mother      Irritable Bowel Syndrome Mother      Obesity Mother      Rheumatoid Arthritis Mother      Diabetes Mother      Hearing Loss Father      Lipids Father      Hypertension Father      Diabetes Father      Diabetes Type 1 Sister      Cancer Maternal Grandmother      Osteoporosis Maternal Grandmother      Cancer Paternal Grandmother        Social History:  Marital Status:   [2]  Social History     Tobacco Use     Smoking status: Former     Types: Cigarettes     Quit date: 4/22/2008     Years since quitting: 15.2     Smokeless tobacco: Never   Vaping Use     Vaping Use: Never used   Substance Use Topics     Alcohol use: Yes     Comment: occasionally - a couple times a month      Drug use: Yes     Types: Marijuana        Medications:    bisacodyl (DULCOLAX) 5 MG EC tablet  EPINEPHrine (ANY BX GENERIC EQUIV) 0.3 MG/0.3ML injection 2-pack  estradiol (VIVELLE-DOT) 0.05 MG/24HR bi-weekly patch  fexofenadine (ALLEGRA) 180 MG tablet  ibuprofen (ADVIL/MOTRIN) 200 MG tablet  magnesium oxide 200 MG TABS  ORDER FOR ALLERGEN IMMUNOTHERAPY          Review of Systems   All other systems reviewed and are negative.      Physical Exam   BP: 134/72  Pulse: 96  Temp: 97.8  F (36.6  C)  Resp: 16  SpO2: 96 %      Physical Exam  Vitals reviewed.   Constitutional:       General: She is not in acute distress.     Appearance: Normal appearance. She is not ill-appearing.   HENT:      Head: Normocephalic and atraumatic.      Right Ear: External ear normal.      Left Ear: External ear normal.      Nose: Nose normal. No congestion.      Mouth/Throat:      Mouth: Mucous membranes are moist.      Pharynx: Oropharynx is clear.      Comments: Widely patent posterior oropharynx with no visible swelling  Eyes:      Extraocular Movements: Extraocular movements intact.      Pupils: Pupils are equal, round, and reactive to light.   Cardiovascular:      Rate and Rhythm: Normal rate and regular rhythm.      Pulses: Normal pulses.      Heart sounds: Normal heart sounds.   Pulmonary:      Effort: Pulmonary effort is normal.      Breath sounds: Normal breath sounds.   Abdominal:      General: Abdomen is flat.      Palpations: Abdomen is soft.      Tenderness: There is no abdominal tenderness.   Musculoskeletal:         General: No swelling or tenderness. Normal range of motion.   Skin:     General: Skin is warm and dry.      Capillary Refill: Capillary refill takes less than 2 seconds.      Findings: No rash.   Neurological:      General: No focal deficit present.      Mental Status: She is alert and oriented to person, place, and time.   Psychiatric:         Mood and Affect: Mood normal.         ED Course        ED Course as of  07/20/23 1345   u Jul 20, 2023   1248 Asymptomatic. Well appearing.   1323 Remains asymptomatic.   1344 Remains asymptomatic 3 hours after epinephrine. Okay for discharge.   1345 Findings were discussed with the patient including non-emergent imaging/lab results. Additional verbal instructions were discussed with the patient as well. Instructed to follow up with a primary care provider within 4-5 days. Also discussed specific warning signs and instructed to return to the ED if there are any concerns. Patient voiced understanding of instructions, questions were answered and the patient was discharged home in stable condition.     Procedures              No results found for this or any previous visit (from the past 24 hour(s)).    Medications   famotidine (PEPCID) tablet 20 mg (20 mg Oral $Given 7/20/23 1042)   diphenhydrAMINE (BENADRYL) capsule 50 mg (50 mg Oral $Given 7/20/23 1042)   dexamethasone (DECADRON) tablet 10 mg (10 mg Oral $Given 7/20/23 1041)   EPINEPHrine (ADRENALIN) kit 0.3 mg (0.3 mg Intramuscular $Given 7/20/23 1042)       Assessments & Plan (with Medical Decision Making)     I have reviewed the nursing notes.    2-year-old female with a history of anaphylaxis presenting that she is concerned having anaphylaxis again.  While her exam is reassuring with no visible swelling and no objective symptoms, she does have multiple subjective symptoms consistent with possible anaphylaxis.  However, given her well appearance and the exam, I discussed that it would be reasonable to try things such as Benadryl or potentially even treating anxiety as I think these may be playing a role in her symptoms.  However, given her sensation of throat closing, there is also the concern for anaphylaxis.  We discussed risks and benefits of watchful waiting versus treatment with epinephrine, Benadryl, Pepcid, and Decadron.  She prefers to treat the symptoms with epinephrine.  While I am somewhat hesitant as I think that this  may not be true anaphylaxis, her objective symptoms are concerning and so we will proceed with treatment.  Plan to observe her for 3 to 4 hours after administration of epinephrine and if her symptoms remain stable or improved will be appropriate for discharge.    See ED Course.     symptomI have reviewed the findings, diagnosis, plan and need for follow up with the patient.    New Prescriptions    No medications on file       Final diagnoses:   Allergic reaction, initial encounter   Globus sensation   Itching       7/20/2023   [facility]     Prince Rowland MD  07/20/23 [id number]

## 2023-07-20 NOTE — Clinical Note
Gwendolyn Dietrich was seen and treated in our emergency department on 7/20/2023.  She may return to work on 07/21/2023.       If you have any questions or concerns, please don't hesitate to call.      Prince Rowland MD

## 2023-07-20 NOTE — DISCHARGE INSTRUCTIONS
- It is unclear if this was truly an allergic reaction or possibly something else. You have refills for epi-pens at the [facility].  - Please return to the Emergency Room if you do not improve, feel worse, or have any new or concerning symptoms. We would especially want to see you back if you experience difficulty breathing, a rash, or diarrhea/vomiting  - Please follow up with a primary care physician in 3-4 days to discuss any other concerns

## 2023-07-23 ENCOUNTER — HEALTH MAINTENANCE LETTER (OUTPATIENT)
Age: 43
End: 2023-07-23

## 2023-08-01 ENCOUNTER — TELEPHONE (OUTPATIENT)
Dept: OTOLARYNGOLOGY | Facility: OTHER | Age: 43
End: 2023-08-01

## 2023-08-01 NOTE — TELEPHONE ENCOUNTER
Attempt #1- 8/1/23  Outcome- Left message to call back to schedule allergy follow up appointment with Pati Bradley.

## 2023-08-02 ENCOUNTER — ALLIED HEALTH/NURSE VISIT (OUTPATIENT)
Dept: ALLERGY | Facility: OTHER | Age: 43
End: 2023-08-02
Attending: PHYSICIAN ASSISTANT
Payer: COMMERCIAL

## 2023-08-02 DIAGNOSIS — J30.9 ALLERGIC RHINITIS: Primary | ICD-10-CM

## 2023-08-02 PROCEDURE — 95117 IMMUNOTHERAPY INJECTIONS: CPT

## 2023-08-02 NOTE — PROGRESS NOTES
Allergy injection/s given and charted on paper allergy flow sheet.  Patient experienced unknown reaction. Patient signed out AMA form at 1425.    Due to injection administration, patient instructed to remain in clinic for 30 minutes  afterwards, and to report any adverse reaction to me immediately.    Clinic Administered Medication Documentation      Injectable Medication Documentation    Is there an active order (written within the past 365 days, with administrations remaining, not ) in the chart? Yes.     Patient was given  allergy injection of 0.35 from red vial 1 given in the left arm and allergy in . Prior to medication administration, verified patient's identity using patient s name and date of birth. Please see MAR and medication order for additional information. Patient instructed to remain in clinic for 15 minutes, report any adverse reaction to staff immediately, remain in clinic for 15 minutes and report any adverse reaction to staff immediately but patient declined, and signed out AMA form at 1425, patient instructed to seek medical help and attention for any symptoms of a reaction or if they needed to use their Epipen, patient aware and acknowledged .    Vial/Syringe: Single dose vial. Was entire vial of medication used? Yes  Was this medication supplied by the patient? No  Is this a medication the patient will need to receive again? Yes. Verified that the patient has refills remaining in their prescription.     Jazzmine Mejia RN

## 2023-08-09 ENCOUNTER — ALLIED HEALTH/NURSE VISIT (OUTPATIENT)
Dept: ALLERGY | Facility: OTHER | Age: 43
End: 2023-08-09
Attending: PHYSICIAN ASSISTANT
Payer: COMMERCIAL

## 2023-08-09 DIAGNOSIS — J30.9 ALLERGIC RHINITIS: Primary | ICD-10-CM

## 2023-08-09 PROCEDURE — 95117 IMMUNOTHERAPY INJECTIONS: CPT

## 2023-08-09 NOTE — PROGRESS NOTES
Allergy injection/s given and charted on paper allergy flow sheet.  Patient experienced unkown reaction.  Due to injection administration, patient instructed to remain in clinic for 15 minutes  afterwards, and to report any adverse reaction to me immediately.  Pt signed out AMA 1145.  TETO DONG LPN

## 2023-08-16 ENCOUNTER — ALLIED HEALTH/NURSE VISIT (OUTPATIENT)
Dept: ALLERGY | Facility: OTHER | Age: 43
End: 2023-08-16
Attending: PHYSICIAN ASSISTANT
Payer: COMMERCIAL

## 2023-08-16 DIAGNOSIS — J30.9 ALLERGIC RHINITIS: Primary | ICD-10-CM

## 2023-08-16 PROCEDURE — 95117 IMMUNOTHERAPY INJECTIONS: CPT

## 2023-08-16 NOTE — PROGRESS NOTES
Allergy injection/s given and charted on paper allergy flow sheet.  Patient experienced unknown reaction.  Due to injection administration, patient instructed to remain in clinic for 15 minutes  afterwards, and to report any adverse reaction to me immediately.  Pt signed out AMA 1425.  TETO DONG LPN

## 2023-08-18 ENCOUNTER — MYC MEDICAL ADVICE (OUTPATIENT)
Dept: SURGERY | Facility: OTHER | Age: 43
End: 2023-08-18
Payer: COMMERCIAL

## 2023-08-18 DIAGNOSIS — Z12.11 SPECIAL SCREENING FOR MALIGNANT NEOPLASMS, COLON: Primary | ICD-10-CM

## 2023-08-22 RX ORDER — POLYETHYLENE GLYCOL 3350, SODIUM CHLORIDE, SODIUM BICARBONATE, POTASSIUM CHLORIDE 420; 11.2; 5.72; 1.48 G/4L; G/4L; G/4L; G/4L
4000 POWDER, FOR SOLUTION ORAL ONCE
Qty: 4000 ML | Refills: 0 | Status: SHIPPED | OUTPATIENT
Start: 2023-08-22 | End: 2023-08-22

## 2023-08-22 RX ORDER — BISACODYL 5 MG/1
TABLET, DELAYED RELEASE ORAL
Qty: 2 TABLET | Refills: 0 | Status: SHIPPED | OUTPATIENT
Start: 2023-08-22 | End: 2023-10-05

## 2023-08-30 ENCOUNTER — ALLIED HEALTH/NURSE VISIT (OUTPATIENT)
Dept: ALLERGY | Facility: OTHER | Age: 43
End: 2023-08-30
Attending: PHYSICIAN ASSISTANT
Payer: COMMERCIAL

## 2023-08-30 DIAGNOSIS — J30.9 ALLERGIC RHINITIS: Primary | ICD-10-CM

## 2023-08-30 PROCEDURE — 95117 IMMUNOTHERAPY INJECTIONS: CPT

## 2023-08-30 NOTE — PROGRESS NOTES
Allergy injection/s given and charted on paper allergy flow sheet.  Patient experienced unknown reaction. Patient signed out AMA form at 1425.    Due to injection administration, patient instructed to remain in clinic for 30 minutes  afterwards, and to report any adverse reaction to me immediately. Patient signed out AMA form at 1425.     Clinic Administered Medication Documentation      Injectable Medication Documentation    Is there an active order (written within the past 365 days, with administrations remaining, not ) in the chart? Yes.     Patient was given  allergy injection of 0.5 ml from red vial 1 given in the left arm and allergy injection of 0.5 ml from red vial 2 given in the right arm . Prior to medication administration, verified patient's identity using patient s name and date of birth. Please see MAR and medication order for additional information. Patient instructed to remain in clinic for 15 minutes, report any adverse reaction to staff immediately, remain in clinic for 15 minutes and report any adverse reaction to staff immediately but patient declined, and patient signed out AMA form at 1425, informed to seek medical attention/help if any symptoms of a reaction or needing to use her Epipen, aware and acknowledged .    Vial/Syringe: Single dose vial. Was entire vial of medication used? Yes  Was this medication supplied by the patient? No  Is this a medication the patient will need to receive again? Yes. Verified that the patient has refills remaining in their prescription.

## 2023-09-13 ENCOUNTER — ANESTHESIA (OUTPATIENT)
Dept: SURGERY | Facility: OTHER | Age: 43
End: 2023-09-13
Payer: COMMERCIAL

## 2023-09-13 ENCOUNTER — ANESTHESIA EVENT (OUTPATIENT)
Dept: SURGERY | Facility: OTHER | Age: 43
End: 2023-09-13
Payer: COMMERCIAL

## 2023-09-13 ENCOUNTER — HOSPITAL ENCOUNTER (OUTPATIENT)
Facility: OTHER | Age: 43
Discharge: HOME | End: 2023-09-13
Attending: SURGERY | Admitting: SURGERY
Payer: COMMERCIAL

## 2023-09-13 VITALS
RESPIRATION RATE: 16 BRPM | HEART RATE: 54 BPM | BODY MASS INDEX: 39.65 KG/M2 | OXYGEN SATURATION: 99 % | SYSTOLIC BLOOD PRESSURE: 128 MMHG | WEIGHT: 238 LBS | TEMPERATURE: 98.3 F | HEIGHT: 65 IN | DIASTOLIC BLOOD PRESSURE: 79 MMHG

## 2023-09-13 DIAGNOSIS — K63.5 POLYP OF TRANSVERSE COLON, UNSPECIFIED TYPE: Primary | ICD-10-CM

## 2023-09-13 PROCEDURE — 45380 COLONOSCOPY AND BIOPSY: CPT | Performed by: NURSE ANESTHETIST, CERTIFIED REGISTERED

## 2023-09-13 PROCEDURE — 45380 COLONOSCOPY AND BIOPSY: CPT | Mod: PT | Performed by: SURGERY

## 2023-09-13 PROCEDURE — 88305 TISSUE EXAM BY PATHOLOGIST: CPT

## 2023-09-13 PROCEDURE — 999N000010 HC STATISTIC ANES STAT CODE-CRNA PER MINUTE: Performed by: SURGERY

## 2023-09-13 PROCEDURE — 258N000003 HC RX IP 258 OP 636: Performed by: SURGERY

## 2023-09-13 PROCEDURE — 45378 DIAGNOSTIC COLONOSCOPY: CPT | Performed by: SURGERY

## 2023-09-13 PROCEDURE — 250N000009 HC RX 250: Performed by: NURSE ANESTHETIST, CERTIFIED REGISTERED

## 2023-09-13 PROCEDURE — 250N000011 HC RX IP 250 OP 636: Performed by: NURSE ANESTHETIST, CERTIFIED REGISTERED

## 2023-09-13 RX ORDER — ONDANSETRON 2 MG/ML
4 INJECTION INTRAMUSCULAR; INTRAVENOUS EVERY 6 HOURS PRN
Status: DISCONTINUED | OUTPATIENT
Start: 2023-09-13 | End: 2023-09-13 | Stop reason: HOSPADM

## 2023-09-13 RX ORDER — PROPOFOL 10 MG/ML
INJECTION, EMULSION INTRAVENOUS CONTINUOUS PRN
Status: DISCONTINUED | OUTPATIENT
Start: 2023-09-13 | End: 2023-09-13

## 2023-09-13 RX ORDER — NALOXONE HYDROCHLORIDE 0.4 MG/ML
0.2 INJECTION, SOLUTION INTRAMUSCULAR; INTRAVENOUS; SUBCUTANEOUS
Status: DISCONTINUED | OUTPATIENT
Start: 2023-09-13 | End: 2023-09-13 | Stop reason: HOSPADM

## 2023-09-13 RX ORDER — LIDOCAINE HYDROCHLORIDE 20 MG/ML
INJECTION, SOLUTION INFILTRATION; PERINEURAL PRN
Status: DISCONTINUED | OUTPATIENT
Start: 2023-09-13 | End: 2023-09-13

## 2023-09-13 RX ORDER — ONDANSETRON 2 MG/ML
4 INJECTION INTRAMUSCULAR; INTRAVENOUS
Status: DISCONTINUED | OUTPATIENT
Start: 2023-09-13 | End: 2023-09-13 | Stop reason: HOSPADM

## 2023-09-13 RX ORDER — PROCHLORPERAZINE MALEATE 5 MG
10 TABLET ORAL EVERY 6 HOURS PRN
Status: DISCONTINUED | OUTPATIENT
Start: 2023-09-13 | End: 2023-09-13 | Stop reason: HOSPADM

## 2023-09-13 RX ORDER — FLUMAZENIL 0.1 MG/ML
0.2 INJECTION, SOLUTION INTRAVENOUS
Status: DISCONTINUED | OUTPATIENT
Start: 2023-09-13 | End: 2023-09-13 | Stop reason: HOSPADM

## 2023-09-13 RX ORDER — NALOXONE HYDROCHLORIDE 0.4 MG/ML
0.4 INJECTION, SOLUTION INTRAMUSCULAR; INTRAVENOUS; SUBCUTANEOUS
Status: DISCONTINUED | OUTPATIENT
Start: 2023-09-13 | End: 2023-09-13 | Stop reason: HOSPADM

## 2023-09-13 RX ORDER — SODIUM CHLORIDE, SODIUM LACTATE, POTASSIUM CHLORIDE, CALCIUM CHLORIDE 600; 310; 30; 20 MG/100ML; MG/100ML; MG/100ML; MG/100ML
INJECTION, SOLUTION INTRAVENOUS CONTINUOUS
Status: DISCONTINUED | OUTPATIENT
Start: 2023-09-13 | End: 2023-09-13 | Stop reason: HOSPADM

## 2023-09-13 RX ORDER — LIDOCAINE 40 MG/G
CREAM TOPICAL
Status: DISCONTINUED | OUTPATIENT
Start: 2023-09-13 | End: 2023-09-13 | Stop reason: HOSPADM

## 2023-09-13 RX ORDER — ONDANSETRON 4 MG/1
4 TABLET, ORALLY DISINTEGRATING ORAL EVERY 6 HOURS PRN
Status: DISCONTINUED | OUTPATIENT
Start: 2023-09-13 | End: 2023-09-13 | Stop reason: HOSPADM

## 2023-09-13 RX ORDER — PROPOFOL 10 MG/ML
INJECTION, EMULSION INTRAVENOUS PRN
Status: DISCONTINUED | OUTPATIENT
Start: 2023-09-13 | End: 2023-09-13

## 2023-09-13 RX ADMIN — LIDOCAINE HYDROCHLORIDE 40 MG: 20 INJECTION, SOLUTION INFILTRATION; PERINEURAL at 09:19

## 2023-09-13 RX ADMIN — PROPOFOL 80 MG: 10 INJECTION, EMULSION INTRAVENOUS at 09:19

## 2023-09-13 RX ADMIN — SODIUM CHLORIDE, POTASSIUM CHLORIDE, SODIUM LACTATE AND CALCIUM CHLORIDE: 600; 310; 30; 20 INJECTION, SOLUTION INTRAVENOUS at 08:43

## 2023-09-13 RX ADMIN — PROPOFOL 140 MCG/KG/MIN: 10 INJECTION, EMULSION INTRAVENOUS at 09:19

## 2023-09-13 ASSESSMENT — ACTIVITIES OF DAILY LIVING (ADL): ADLS_ACUITY_SCORE: 35

## 2023-09-13 ASSESSMENT — LIFESTYLE VARIABLES: TOBACCO_USE: 1

## 2023-09-13 NOTE — ANESTHESIA POSTPROCEDURE EVALUATION
Patient: Gwendolyn Dietrich    Procedure: Procedure(s):  Colonoscopy WITH POLYPECTOMY       Anesthesia Type:  MAC    Note:  Disposition: Outpatient   Postop Pain Control: Uneventful            Sign Out: Well controlled pain   PONV: No   Neuro/Psych: Uneventful            Sign Out: Acceptable/Baseline neuro status   Airway/Respiratory: Uneventful            Sign Out: Acceptable/Baseline resp. status   CV/Hemodynamics: Uneventful            Sign Out: Acceptable CV status; No obvious hypovolemia; No obvious fluid overload   Other NRE: NONE   DID A NON-ROUTINE EVENT OCCUR?            Last vitals:  Vitals Value Taken Time   /79 09/13/23 1000   Temp 98.3  F (36.8  C) 09/13/23 0942   Pulse 54 09/13/23 1000   Resp 16 09/13/23 1000   SpO2 98 % 09/13/23 1001   Vitals shown include unvalidated device data.    Electronically Signed By: GUILHERME LUO CRNA  September 13, 2023  10:22 AM

## 2023-09-13 NOTE — OP NOTE
PROCEDURE NOTE    SURGEON: Tasha Sparks MD.    PRE-OP DIAGNOSIS:  Screening Colonoscopy      POST-OP DIAGNOSIS: colon polyp      Location: transverse Size: 0.2 cm  Removed:  Y    PROCEDURE:  Colonoscopy with polypectomy cold forceps    ESTIMATEDBLOOD LOSS: none    COMPLICATIONS:  None    SPECIMEN:  transverse colon polyp    ANESTHESIA:  See anesthesia note    INDICATION FOR THE PROCEDURE: The patient is a 42 year old female. The patient has no complaints. I explained to the patient the risks, benefits and alternatives to screening colonoscopy for evaluating the colon for colon polyps and colon cancer. We specifically discussed the risks of bleeding, infection, perforation, potential inability to reach the cecum and the risks of sedation. The patient's questions were answered and the patient wished to proceed. Informed consent paperwork was completed.    PROCEDURE: The patient was taken to the endoscopy suite. Appropriate monitors were attached. The patient was placed in the left lateral decubitus position.Timeout was performed confirming the patient's identity and procedure to be performed. After appropriate sedation was confirmed, digital rectal exam was performed. There was normal tone and no gross abnormality was noted. The lubricated colonoscope was introduced into the anus the colon was insufflated with air. The prep quality was adequate. Under direct visualization the scope was advanced to the cecum. The ileocecal valve was intubated and the terminal ileum inspected. No gross abnormality was noted. The scope was withdrawn back into the cecum. The mucosa of colon was inspected while withdrawing the scope. A tiny sessile polyp was noted in the transverse colon and removed with cold forceps. The scope was retroflexed in the rectum and the anorectal junction was inspected. No abnormalities were noted. The scope was returned to a neutral position and the colon was decompressed. The scope was removed. The patient  tolerated the procedure with no immediately apparent complication. The patient was taken to recovery in stable condition.  FOLLOW UP:  RECOMMEND high fiber diet, follow up: will call with pathology results.

## 2023-09-13 NOTE — DISCHARGE INSTRUCTIONS
Procedure you had done: colonoscopy with removal of polyp  Your health care provider is:  Alfonso Alvarez  Your surgeon is Dr. Tasha Arthur.   Please call your health care provider or surgeon at [phone] if:    - you feel you are getting worse or having an increase in problems    - fever greater than 101 degrees  - increasing shortness of breath or chest pain  - any signs of infection (increasing redness, swelling, tenderness, warmth, change in appearance, or  increased drainage)  - blood in your urine or stool  - coughing or vomiting blood  - nausea (upset stomach) and vomiting and/or diarrhea that will not stop  - severe pain that is not relieved by medicine, rest or ice  You have had medications for sedation. Please be aware that this can cause drowsiness and impaired judgment for up to 24 hours after your procedure. Do not drive, operate power tools or drink alcohol for 24 hours.  If samples were taken-you will get a phone call and a letter with your results in the next 7-10 days. If you don't get results, please call and let us know!     [facility]  Adult Discharge Orders & Instructions    ________________________________________________________________          For 12 hours after surgery  Get plenty of rest.  A responsible adult must stay with you for at least 12 hours after you leave the hospital.   You may feel lightheaded.  IF so, sit for a few minutes before standing.  Have someone help you get up.   You may have a slight fever. Call the doctor if your fever is over 101 F (38.3 C) (taken under the tongue) or lasts longer than 24 hours.  You may have a dry mouth, a sore throat, muscle aches or trouble sleeping.  These should go away after 24 hours.  Do not make important or legal decisions.  6.   Do not drive or use heavy equipment.  If you have weakness or tingling, don't drive or use heavy equipment until this feeling goes away.    To contact a doctor, call    [phone]_______________________

## 2023-09-13 NOTE — OR NURSING
Gwendolyn has been discharged to home at 1025 via car accompanied by     Written discharge instructions were provided to patient.  Prescriptions were n/a.  Patient states their pain is denies, and denies any nausea or dizziness upon discharge.    Patient and adult caring for them verbalize understanding of discharge instructions including no driving until tomorrow and no longer taking narcotic pain medications - no operating mechanical equipment and no making any important decisions.They understand reason for discharge, and necessary follow-up appointments.

## 2023-09-13 NOTE — ANESTHESIA CARE TRANSFER NOTE
Patient: Gwendolyn Dietrich    Procedure: Procedure(s):  Colonoscopy WITH POLYPECTOMY       Diagnosis: Serrated adenoma of colon [D12.6]  Diagnosis Additional Information: No value filed.    Anesthesia Type:   MAC     Note:    Oropharynx: oropharynx clear of all foreign objects  Level of Consciousness: drowsy  Oxygen Supplementation: room air    Independent Airway: airway patency satisfactory and stable    Vital Signs Stable: post-procedure vital signs reviewed and stable  Report to RN Given: handoff report given  Patient transferred to: Phase II    Handoff Report: Identifed the Patient, Identified the Reponsible Provider, Reviewed the pertinent medical history, Discussed the surgical course, Reviewed Intra-OP anesthesia mangement and issues during anesthesia, Set expectations for post-procedure period and Allowed opportunity for questions and acknowledgement of understanding      Vitals:  Vitals Value Taken Time   BP     Temp     Pulse     Resp     SpO2         Electronically Signed By: GUILHERME LUO [facility]  September 13, 2023  9:43 AM

## 2023-09-13 NOTE — ANESTHESIA PREPROCEDURE EVALUATION
Anesthesia Pre-Procedure Evaluation    Patient: Gwendolyn Dietrich   MRN: [id number] : 1980        Procedure : Procedure(s):  Colonoscopy          Past Medical History:   Diagnosis Date     Obesity 2013      Past Surgical History:   Procedure Laterality Date     BACK SURGERY  2005     BIOPSY OF SKIN LESION       CHOLECYSTECTOMY  2011     COLONOSCOPY N/A 2022    serrated adenoma, follow uo 2023     HYSTERECTOMY, PAP STILL INDICATED  2012    CERVIX STILL REMAINS.     HYSTERECTOMY, SUPRACERVICAL LAPAROSCOPIC N/A 2012    Cervix and bilateral ovaries remain. Performed due to menorrhagia, precancerous cells     LAPAROSCOPY DIAGNOSTIC (GYN) Bilateral 2023    Procedure: LAPAROSCOPY; RIGHT ADNEXECTOMY AND BILATERAL SALPING OOPHORECTOMY, CAUTERIZATION OF ENDOMETRIOSIS;  Surgeon: Earnest Brooks MD;  Location: [facility] OR     ORTHOPEDIC SURGERY  2005    back surgery     TONSILLECTOMY       TUBAL LIGATION  2007      Allergies   Allergen Reactions     Seasonal Allergies Anaphylaxis     Birch, Dog and Cats - has Epi-pen for this reason     Codeine GI Disturbance      Social History     Tobacco Use     Smoking status: Former     Types: Cigarettes     Quit date: 2008     Years since quitting: 15.4     Smokeless tobacco: Never   Substance Use Topics     Alcohol use: Yes     Comment: occasionally - a couple times a month      Wt Readings from Last 1 Encounters:   23 108 kg (238 lb)        Anesthesia Evaluation   Pt has had prior anesthetic.     No history of anesthetic complications       ROS/MED HX  ENT/Pulmonary:     (+)                tobacco use, Past use,                      Neurologic:     (+)    peripheral neuropathy,  migraines,                          Cardiovascular:     (+) Dyslipidemia - -   -  - -                                      METS/Exercise Tolerance: >4 METS    Hematologic:  - neg hematologic  ROS     Musculoskeletal:  - neg musculoskeletal ROS      GI/Hepatic:     (+)        bowel prep,            Renal/Genitourinary:  - neg Renal ROS     Endo:     (+)               Obesity,       Psychiatric/Substance Use:  - neg psychiatric ROS     Infectious Disease:  - neg infectious disease ROS     Malignancy:  - neg malignancy ROS     Other:  - neg other ROS          Physical Exam    Airway        Mallampati: II   TM distance: > 3 FB   Neck ROM: full   Mouth opening: > 3 cm    Respiratory Devices and Support         Dental       (+) Completely normal teeth      Cardiovascular   cardiovascular exam normal       Rhythm and rate: regular and normal     Pulmonary   pulmonary exam normal        breath sounds clear to auscultation       OUTSIDE LABS:  CBC:   Lab Results   Component Value Date    WBC 7.5 02/21/2023    WBC 5.0 06/22/2022    HGB 13.3 02/21/2023    HGB 12.6 06/22/2022    HCT 38.7 02/21/2023    HCT 36.9 06/22/2022     02/21/2023     06/22/2022     BMP:   Lab Results   Component Value Date     02/21/2023     11/29/2021    POTASSIUM 4.1 02/21/2023    POTASSIUM 4.0 11/29/2021    CHLORIDE 104 02/21/2023    CHLORIDE 103 11/29/2021    CO2 26 02/21/2023    CO2 27 11/29/2021    BUN 9.0 02/21/2023    BUN 12 11/29/2021    CR 0.80 02/21/2023    CR 0.88 11/29/2021    GLC 95 02/21/2023    GLC 83 11/29/2021     COAGS: No results found for: PTT, INR, FIBR  POC: No results found for: BGM, HCG, HCGS  HEPATIC:   Lab Results   Component Value Date    ALBUMIN 4.5 02/21/2023    PROTTOTAL 7.1 02/21/2023    ALT 34 02/21/2023    AST 27 02/21/2023    ALKPHOS 68 02/21/2023    BILITOTAL 0.5 02/21/2023     OTHER:   Lab Results   Component Value Date    LACT 1.3 11/16/2020    A1C 4.9 11/29/2021    EVER 9.2 02/21/2023    MAG 1.9 12/22/2021    TSH 1.97 06/22/2022    T4 1.01 06/22/2022    T3 114 06/22/2022    CRP 5.0 06/22/2022    SED 8 06/22/2022       Anesthesia Plan    ASA Status:  2    NPO Status:  NPO Appropriate    Anesthesia Type: MAC.               Consents    Anesthesia Plan(s) and associated risks, benefits, and realistic alternatives discussed. Questions answered and patient/representative(s) expressed understanding.     - Discussed: Risks, Benefits and Alternatives for BOTH SEDATION and the PROCEDURE were discussed     - Discussed with:  Patient      - Extended Intubation/Ventilatory Support Discussed: No.      - Patient is DNR/DNI Status: No     Use of blood products discussed: No .     Postoperative Care            Comments:              GUILHERME Powell CRNA

## 2023-09-13 NOTE — H&P
PRE-PROCEDURE NOTE    CHIEF COMPLAINT / REASON FORPROCEDURE:  Need for screening colonoscopy.    PERTINENT HISTORY   Patient with no complaints. Previous colonoscopy 2022-sessile serrated adenomas. No diarrhea, constipation, abdominal pain or rectal bleeding. No family history of colon polyps or colon cancer.  Past Medical History:   Diagnosis Date    Obesity 5/8/2013     Past Surgical History:   Procedure Laterality Date    BACK SURGERY  01/01/2005    BIOPSY OF SKIN LESION      CHOLECYSTECTOMY  01/01/2011    COLONOSCOPY N/A 06/08/2022    serrated adenoma, follow uo 6/8/2023    HYSTERECTOMY, PAP STILL INDICATED  03/27/2012    CERVIX STILL REMAINS.    HYSTERECTOMY, SUPRACERVICAL LAPAROSCOPIC N/A 03/27/2012    Cervix and bilateral ovaries remain. Performed due to menorrhagia, precancerous cells    LAPAROSCOPY DIAGNOSTIC (GYN) Bilateral 4/19/2023    Procedure: LAPAROSCOPY; RIGHT ADNEXECTOMY AND BILATERAL SALPING OOPHORECTOMY, CAUTERIZATION OF ENDOMETRIOSIS;  Surgeon: Earnest Brooks MD;  Location: [facility] OR    ORTHOPEDIC SURGERY  01/01/2005    back surgery    TONSILLECTOMY      TUBAL LIGATION  01/01/2007     Other:  None  Bleeding tendencies:  No    Relevant Family History:  none    Relevant Social History:  none    A relevant review of systems was performed and was Negative.    ALLERGIES/SENSITIVITIES:   Allergies   Allergen Reactions    Seasonal Allergies Anaphylaxis     Birch, Dog and Cats - has Epi-pen for this reason    Codeine GI Disturbance        CURRENTMEDICATIONS:    No current facility-administered medications on file prior to encounter.  fexofenadine (ALLEGRA) 180 MG tablet, Take 1 tablet (180 mg) by mouth daily  ibuprofen (ADVIL/MOTRIN) 200 MG tablet, Take 200 mg by mouth every 4 hours as needed for mild pain  EPINEPHrine (ANY BX GENERIC EQUIV) 0.3 MG/0.3ML injection 2-pack, Inject 0.3 mLs (0.3 mg) into the muscle as needed for anaphylaxis  ORDER FOR ALLERGEN IMMUNOTHERAPY, Reached every other week injections  "on 12/07/22. Continue every other week injections x 2 years. Follow every other week maintenance protocol.      No current facility-administered medications for this encounter.       PRE-SEDATION ASSESSMENT:    /76   Pulse 58   Temp 98.6  F (37  C) (Tympanic)   Ht 1.651 m (5' 5\")   Wt 108 kg (238 lb)   LMP  (LMP Unknown)   SpO2 98%   BMI 39.61 kg/m    Lung Exam:  Normal  Heart Exam:  Normal    Comment(s):      IMPRESSION:  Need for screening colonoscopy.    PLAN:  I discussed screening colonoscopy with the patient.       "

## 2023-09-15 NOTE — RESULT ENCOUNTER NOTE
Tracy/Alyssa/Charlotte-Please call patient and let them know the colon polyp that was removed was a tubular adenoma. This type of polyp has a low risk of being associated with a colon cancer. Follow up colonoscopy is recommended in 5 years to check for new polyps. High fiber diet is recommended for colon health. Patient should call with questions or concerns. Thanks!

## 2023-09-20 ENCOUNTER — ALLIED HEALTH/NURSE VISIT (OUTPATIENT)
Dept: ALLERGY | Facility: OTHER | Age: 43
End: 2023-09-20
Attending: NURSE PRACTITIONER
Payer: COMMERCIAL

## 2023-09-20 DIAGNOSIS — J30.9 ALLERGIC RHINITIS: Primary | ICD-10-CM

## 2023-09-20 PROCEDURE — 95117 IMMUNOTHERAPY INJECTIONS: CPT

## 2023-09-20 NOTE — PROGRESS NOTES
Allergy injection/s given and charted on paper allergy flow sheet.  Patient experienced unknown reaction. Patient signed out AMA form at 1430.    Due to injection administration, patient instructed to remain in clinic for 30 minutes  afterwards, and to report any adverse reaction to me immediately. Patient signed out AMA form at 1430. Patient instructed to seek medical attention/help if needing to use their Epipen or having any reaction symptoms, patient aware and acknowledged.     Patient received allergy injection of 0.5 ml from red vial 1 given in left arm and allergy injection of 0.5 ml from red vial 2 given in th left arm.

## 2023-10-01 ENCOUNTER — HEALTH MAINTENANCE LETTER (OUTPATIENT)
Age: 43
End: 2023-10-01

## 2023-10-02 ASSESSMENT — ENCOUNTER SYMPTOMS
ARTHRALGIAS: 1
FREQUENCY: 0
SHORTNESS OF BREATH: 0
HEMATOCHEZIA: 0
PALPITATIONS: 0
HEMATURIA: 0
NAUSEA: 0
HEADACHES: 1
WEAKNESS: 0
MYALGIAS: 0
FEVER: 0
DYSURIA: 0
ABDOMINAL PAIN: 0
BREAST MASS: 0
COUGH: 0
EYE PAIN: 0
NERVOUS/ANXIOUS: 0
HEARTBURN: 0
SORE THROAT: 0
CHILLS: 0
CONSTIPATION: 1
DIARRHEA: 1
JOINT SWELLING: 1
DIZZINESS: 0
PARESTHESIAS: 0

## 2023-10-04 ENCOUNTER — ALLIED HEALTH/NURSE VISIT (OUTPATIENT)
Dept: ALLERGY | Facility: OTHER | Age: 43
End: 2023-10-04
Attending: FAMILY MEDICINE
Payer: COMMERCIAL

## 2023-10-04 DIAGNOSIS — J30.9 ALLERGIC RHINITIS: Primary | ICD-10-CM

## 2023-10-04 PROCEDURE — 95117 IMMUNOTHERAPY INJECTIONS: CPT

## 2023-10-04 NOTE — PROGRESS NOTES
Allergy injection/s given and charted on paper allergy flow sheet.  Patient experienced unknown reaction.  Due to injection administration, patient instructed to remain in clinic for 15 minutes  afterwards, and to report any adverse reaction to me immediately.  Pt signed out AMA 1430.  TETO DONG LPN

## 2023-10-05 ENCOUNTER — OFFICE VISIT (OUTPATIENT)
Dept: FAMILY MEDICINE | Facility: OTHER | Age: 43
End: 2023-10-05
Attending: STUDENT IN AN ORGANIZED HEALTH CARE EDUCATION/TRAINING PROGRAM
Payer: COMMERCIAL

## 2023-10-05 VITALS
SYSTOLIC BLOOD PRESSURE: 130 MMHG | HEART RATE: 64 BPM | HEIGHT: 66 IN | DIASTOLIC BLOOD PRESSURE: 82 MMHG | TEMPERATURE: 98 F | RESPIRATION RATE: 20 BRPM | BODY MASS INDEX: 38.17 KG/M2 | WEIGHT: 237.5 LBS | OXYGEN SATURATION: 98 %

## 2023-10-05 DIAGNOSIS — E55.9 VITAMIN D DEFICIENCY: ICD-10-CM

## 2023-10-05 DIAGNOSIS — Z13.9 SCREENING FOR CONDITION: ICD-10-CM

## 2023-10-05 DIAGNOSIS — M25.50 POLYARTHRALGIA: ICD-10-CM

## 2023-10-05 DIAGNOSIS — Z00.00 ROUTINE GENERAL MEDICAL EXAMINATION AT A HEALTH CARE FACILITY: Primary | ICD-10-CM

## 2023-10-05 DIAGNOSIS — Z12.31 ENCOUNTER FOR SCREENING MAMMOGRAM FOR BREAST CANCER: ICD-10-CM

## 2023-10-05 LAB
ANION GAP SERPL CALCULATED.3IONS-SCNC: 9 MMOL/L (ref 7–15)
BASO+EOS+MONOS # BLD AUTO: NORMAL 10*3/UL
BASO+EOS+MONOS NFR BLD AUTO: NORMAL %
BASOPHILS # BLD AUTO: 0 10E3/UL (ref 0–0.2)
BASOPHILS NFR BLD AUTO: 1 %
BUN SERPL-MCNC: 11.4 MG/DL (ref 6–20)
CALCIUM SERPL-MCNC: 9.4 MG/DL (ref 8.6–10)
CHLORIDE SERPL-SCNC: 106 MMOL/L (ref 98–107)
CHOLEST SERPL-MCNC: 253 MG/DL
CREAT SERPL-MCNC: 0.77 MG/DL (ref 0.51–0.95)
CRP SERPL-MCNC: <3 MG/L
DEPRECATED HCO3 PLAS-SCNC: 26 MMOL/L (ref 22–29)
EGFRCR SERPLBLD CKD-EPI 2021: >90 ML/MIN/1.73M2
EOSINOPHIL # BLD AUTO: 0.2 10E3/UL (ref 0–0.7)
EOSINOPHIL NFR BLD AUTO: 5 %
ERYTHROCYTE [DISTWIDTH] IN BLOOD BY AUTOMATED COUNT: 12.4 % (ref 10–15)
GLUCOSE SERPL-MCNC: 117 MG/DL (ref 70–99)
HCT VFR BLD AUTO: 38.1 % (ref 35–47)
HDLC SERPL-MCNC: 52 MG/DL
HGB BLD-MCNC: 12.8 G/DL (ref 11.7–15.7)
IMM GRANULOCYTES # BLD: 0 10E3/UL
IMM GRANULOCYTES NFR BLD: 0 %
LDLC SERPL CALC-MCNC: 167 MG/DL
LYMPHOCYTES # BLD AUTO: 1.5 10E3/UL (ref 0.8–5.3)
LYMPHOCYTES NFR BLD AUTO: 35 %
MCH RBC QN AUTO: 28.3 PG (ref 26.5–33)
MCHC RBC AUTO-ENTMCNC: 33.6 G/DL (ref 31.5–36.5)
MCV RBC AUTO: 84 FL (ref 78–100)
MONOCYTES # BLD AUTO: 0.4 10E3/UL (ref 0–1.3)
MONOCYTES NFR BLD AUTO: 10 %
NEUTROPHILS # BLD AUTO: 2.1 10E3/UL (ref 1.6–8.3)
NEUTROPHILS NFR BLD AUTO: 49 %
NONHDLC SERPL-MCNC: 201 MG/DL
NRBC # BLD AUTO: 0 10E3/UL
NRBC BLD AUTO-RTO: 0 /100
PLATELET # BLD AUTO: 231 10E3/UL (ref 150–450)
POTASSIUM SERPL-SCNC: 4.4 MMOL/L (ref 3.4–5.3)
RBC # BLD AUTO: 4.52 10E6/UL (ref 3.8–5.2)
SODIUM SERPL-SCNC: 141 MMOL/L (ref 135–145)
TRIGL SERPL-MCNC: 172 MG/DL
TSH SERPL DL<=0.005 MIU/L-ACNC: 1.21 UIU/ML (ref 0.3–4.2)
VIT D+METAB SERPL-MCNC: 16 NG/ML (ref 20–50)
WBC # BLD AUTO: 4.3 10E3/UL (ref 4–11)

## 2023-10-05 PROCEDURE — 86431 RHEUMATOID FACTOR QUANT: CPT | Mod: ZL | Performed by: STUDENT IN AN ORGANIZED HEALTH CARE EDUCATION/TRAINING PROGRAM

## 2023-10-05 PROCEDURE — 86200 CCP ANTIBODY: CPT | Mod: ZL | Performed by: STUDENT IN AN ORGANIZED HEALTH CARE EDUCATION/TRAINING PROGRAM

## 2023-10-05 PROCEDURE — 99214 OFFICE O/P EST MOD 30 MIN: CPT | Mod: 25 | Performed by: STUDENT IN AN ORGANIZED HEALTH CARE EDUCATION/TRAINING PROGRAM

## 2023-10-05 PROCEDURE — 82306 VITAMIN D 25 HYDROXY: CPT | Mod: ZL | Performed by: STUDENT IN AN ORGANIZED HEALTH CARE EDUCATION/TRAINING PROGRAM

## 2023-10-05 PROCEDURE — 86038 ANTINUCLEAR ANTIBODIES: CPT | Mod: ZL | Performed by: STUDENT IN AN ORGANIZED HEALTH CARE EDUCATION/TRAINING PROGRAM

## 2023-10-05 PROCEDURE — 80061 LIPID PANEL: CPT | Mod: ZL | Performed by: STUDENT IN AN ORGANIZED HEALTH CARE EDUCATION/TRAINING PROGRAM

## 2023-10-05 PROCEDURE — 99396 PREV VISIT EST AGE 40-64: CPT | Performed by: STUDENT IN AN ORGANIZED HEALTH CARE EDUCATION/TRAINING PROGRAM

## 2023-10-05 PROCEDURE — 84443 ASSAY THYROID STIM HORMONE: CPT | Mod: ZL | Performed by: STUDENT IN AN ORGANIZED HEALTH CARE EDUCATION/TRAINING PROGRAM

## 2023-10-05 PROCEDURE — 85025 COMPLETE CBC W/AUTO DIFF WBC: CPT | Mod: ZL | Performed by: STUDENT IN AN ORGANIZED HEALTH CARE EDUCATION/TRAINING PROGRAM

## 2023-10-05 PROCEDURE — 36415 COLL VENOUS BLD VENIPUNCTURE: CPT | Mod: ZL | Performed by: STUDENT IN AN ORGANIZED HEALTH CARE EDUCATION/TRAINING PROGRAM

## 2023-10-05 PROCEDURE — 86140 C-REACTIVE PROTEIN: CPT | Mod: ZL | Performed by: STUDENT IN AN ORGANIZED HEALTH CARE EDUCATION/TRAINING PROGRAM

## 2023-10-05 PROCEDURE — 80048 BASIC METABOLIC PNL TOTAL CA: CPT | Mod: ZL | Performed by: STUDENT IN AN ORGANIZED HEALTH CARE EDUCATION/TRAINING PROGRAM

## 2023-10-05 ASSESSMENT — ENCOUNTER SYMPTOMS
NERVOUS/ANXIOUS: 0
ARTHRALGIAS: 1
FEVER: 0
EYE PAIN: 0
DYSURIA: 0
CHILLS: 0
HEADACHES: 1
PALPITATIONS: 0
HEMATOCHEZIA: 0
BREAST MASS: 0
NAUSEA: 0
DIARRHEA: 1
CONSTIPATION: 1
HEMATURIA: 0
ABDOMINAL PAIN: 0
PARESTHESIAS: 0
JOINT SWELLING: 1
WEAKNESS: 0
HEARTBURN: 0
SHORTNESS OF BREATH: 0
FREQUENCY: 0
SORE THROAT: 0
COUGH: 0
MYALGIAS: 0
DIZZINESS: 0

## 2023-10-05 ASSESSMENT — PAIN SCALES - GENERAL: PAINLEVEL: MODERATE PAIN (4)

## 2023-10-05 NOTE — PROGRESS NOTES
SUBJECTIVE:   CC: Gwendolyn is an 43 year old who presents for preventive health visit.       Healthy Habits:     Getting at least 3 servings of Calcium per day:  NO    Bi-annual eye exam:  Yes    Dental care twice a year:  Yes    Sleep apnea or symptoms of sleep apnea:  None    Diet:  Other    Frequency of exercise:  4-5 days/week    Duration of exercise:  30-45 minutes    Taking medications regularly:  Yes    Medication side effects:  Not applicable    Additional concerns today:  Yes      Due for a physical, mammogram. UTD on pap and CRC screen    Hands and knees stiffness   - stiff in the AM  - as moving gets better  - sometimes hips stiff  - sits for a while stiff again   - tried ibuprofen   - started over a year ago, worse in the last 6 moths  - wants to know why  - has had work up for this in the past               Have you ever done Advance Care Planning? (For example, a Health Directive, POLST, or a discussion with a medical provider or your loved ones about your wishes): Yes, patient states has an Advance Care Planning document and will bring a copy to the clinic.    Social History     Tobacco Use    Smoking status: Former     Types: Cigarettes     Quit date: 4/22/2008     Years since quitting: 15.4    Smokeless tobacco: Never   Substance Use Topics    Alcohol use: Yes     Comment: occasionally - a couple times a month             10/2/2023     1:17 PM   Alcohol Use   Prescreen: >3 drinks/day or >7 drinks/week? No     Reviewed orders with patient.  Reviewed health maintenance and updated orders accordingly - Yes  Lab work is in process    Breast Cancer Screening:        10/2/2023     1:18 PM   Breast CA Risk Assessment (FHS-7)   Do you have a family history of breast, colon, or ovarian cancer? No / Unknown         Mammogram Screening: Recommended annual mammography  Pertinent mammograms are reviewed under the imaging tab.    History of abnormal Pap smear: Last 3 Pap and HPV Results:       Latest Ref Rng &  "Units 3/2/2023     8:59 AM 5/8/2013    12:00 AM   PAP / HPV   PAP  Negative for Intraepithelial Lesion or Malignancy (NILM)     PAP (Historical)   NIL    HPV 16 DNA Negative Negative     HPV 18 DNA Negative Negative     Other HR HPV Negative Negative           Latest Ref Rng & Units 3/2/2023     8:59 AM 5/8/2013    12:00 AM   PAP / HPV   PAP  Negative for Intraepithelial Lesion or Malignancy (NILM)     PAP (Historical)   NIL    HPV 16 DNA Negative Negative     HPV 18 DNA Negative Negative     Other HR HPV Negative Negative       Reviewed and updated as needed this visit by clinical staff   Tobacco  Allergies  Meds    Surg Hx  Fam Hx          Reviewed and updated as needed this visit by Provider   Tobacco      Surg Hx  Fam Hx             Review of Systems   Constitutional:  Negative for chills and fever.   HENT:  Negative for congestion, ear pain, hearing loss and sore throat.    Eyes:  Negative for pain and visual disturbance.   Respiratory:  Negative for cough and shortness of breath.    Cardiovascular:  Negative for chest pain, palpitations and peripheral edema.   Gastrointestinal:  Positive for constipation and diarrhea. Negative for abdominal pain, heartburn, hematochezia and nausea.   Breasts:  Negative for tenderness, breast mass and discharge.   Genitourinary:  Negative for dysuria, frequency, genital sores, hematuria, pelvic pain, urgency, vaginal bleeding and vaginal discharge.   Musculoskeletal:  Positive for arthralgias and joint swelling. Negative for myalgias.   Skin:  Negative for rash.   Neurological:  Positive for headaches. Negative for dizziness, weakness and paresthesias.   Psychiatric/Behavioral:  Negative for mood changes. The patient is not nervous/anxious.           OBJECTIVE:   /82 (BP Location: Right arm, Patient Position: Sitting, Cuff Size: Adult Large)   Pulse 98   Temp 98  F (36.7  C) (Tympanic)   Resp 20   Ht 1.676 m (5' 6\")   Wt 107.7 kg (237 lb 8 oz)   LMP  (LMP " Unknown)   SpO2 (!) 64%   BMI 38.33 kg/m    Physical Exam  GENERAL: healthy, alert and no distress  EYES: Eyes grossly normal to inspection, PERRL and conjunctivae and sclerae normal  HENT: ear canals and TM's normal, nose and mouth without ulcers or lesions  NECK: no adenopathy, no asymmetry, masses, or scars and thyroid normal to palpation  RESP: lungs clear to auscultation - no rales, rhonchi or wheezes  CV: regular rate and rhythm, normal S1 S2, no S3 or S4, no murmur, click or rub, no peripheral edema and peripheral pulses strong  ABDOMEN: soft, nontender, no hepatosplenomegaly, no masses and bowel sounds normal  MS: tenderness to palpation MCPs, DIPs, PIPs, bilateral hands all joints. Normal ROM in knees   SKIN: vitiligo changes forearm  PSYCH: mentation appears normal, affect normal/bright        ASSESSMENT/PLAN:   Gwendolyn was seen today for physical.    Diagnoses and all orders for this visit:    Routine general medical examination at a health care facility    Encounter for screening mammogram for breast cancer  -     MA Screen Bilateral w/Tay; Future  -     Lipid Profile; Future  -     Basic Metabolic Panel; Future  -     CBC and Differential; Future  -     CBC and Differential  -     Basic Metabolic Panel  -     Lipid Profile    Screening for condition  -     TSH Reflex [facility]; Future  -     Vitamin D Total; Future  -     Vitamin D Total  -     TSH Reflex [facility]    Polyarthralgia  Chronic issue. Progressively worsening in the last 6 months. Uses otc analgesics if needed  -     Cyclic Citrullinated Peptide Antibody IgG; Future  -     Anti Nuclear Brianna IgG by IFA with Reflex; Future  -     CRP inflammation; Future  -     Rheumatoid factor; Future  -     Rheumatoid factor  -     CRP inflammation  -     Anti Nuclear Brianna IgG by IFA with Reflex  -     Cyclic Citrullinated Peptide Antibody IgG              COUNSELING:  Reviewed preventive health counseling, as reflected in patient instructions       Regular exercise        Healthy diet/nutrition       Osteoporosis prevention/bone health       Colorectal Cancer Screening        She reports that she quit smoking about 15 years ago. Her smoking use included cigarettes. She has never used smokeless tobacco.          Dajuan Amezcua MD  [facility]

## 2023-10-05 NOTE — NURSING NOTE
Patient presents to clinic for physical exam.  Medication Reconciliation: Complete    Tabitha Leone LPN  10/5/2023 8:25 AM

## 2023-10-06 LAB
ANA SER QL IF: NEGATIVE
CCP AB SER IA-ACNC: 0.7 U/ML
RHEUMATOID FACT SER NEPH-ACNC: <6 IU/ML

## 2023-10-06 RX ORDER — VITAMIN B COMPLEX
25 TABLET ORAL DAILY
Qty: 90 TABLET | Refills: 3 | Status: SHIPPED | OUTPATIENT
Start: 2023-10-06 | End: 2023-12-14

## 2023-10-11 NOTE — PROGRESS NOTES
"Chief Complaint   Patient presents with    Allergic Rhinitis     Allergy follow up (hx: Perennial allergic rhinitis)         Patient presents for f/u of SCIT.   She was last seen in ENT on 9/30/21 for SCIT follow up.    She has been doing well with allergy injections   Intermittent surface itching/ bruise following injections. No significant reaction.   No significant allergy symptoms.   She has been doing well.   No concerns with URI, Bronchitis, sinusitis.   No worrisome nasal congestion, post nasal drainage.   Denies n/f/v.   Adequate to fair water.   Denies facial pain or pressure. No recurrent sinusitis.      Allergies fairly well controlled.            MQT  Dilution #6-Birch, Cat, Dust  Dilution #5- Ragweed, thistle, grass, maple, molds, dog  Dilution #2- pigweed, cottonwood, walnut, molds.     SCIT- 10/1/19  SLIT- Maintenance dose - 6/15/21.   Started SCIT 2019  EOW 12/2022    Past Medical History:   Diagnosis Date    Obesity 5/8/2013        Allergies   Allergen Reactions    Seasonal Allergies Anaphylaxis     Birch, Dog and Cats - has Epi-pen for this reason    Codeine GI Disturbance     Current Outpatient Medications   Medication    EPINEPHrine (ANY BX GENERIC EQUIV) 0.3 MG/0.3ML injection 2-pack    fexofenadine (ALLEGRA) 180 MG tablet    ibuprofen (ADVIL/MOTRIN) 200 MG tablet    ORDER FOR ALLERGEN IMMUNOTHERAPY    Vitamin D3 (CHOLECALCIFEROL) 25 mcg (1000 units) tablet     No current facility-administered medications for this visit.     ROS - SEE HPI  /80 (BP Location: Right arm, Patient Position: Sitting, Cuff Size: Adult Large)   Pulse 64   Temp 97.3  F (36.3  C) (Tympanic)   Resp 16   Ht 1.676 m (5' 6\")   Wt 107.5 kg (237 lb)   LMP  (LMP Unknown)   SpO2 98%   BMI 38.25 kg/m      General - The patient is well nourished and well developed, and appears to have good nutritional status.  Alert and oriented to person and place, answers questions and cooperates with examination appropriately. "   Head and Face - Normocephalic and atraumatic, with no gross asymmetry noted.  The facial nerve is intact, with strong symmetric movements.  Voice and Breathing - The patient was breathing comfortably without the use of accessory muscles. There was no wheezing, stridor, or stertor.  The patients voice was clear and strong, and had appropriate pitch and quality.  Ears -The external auditory canals are patent, the tympanic membranes are intact without effusion, retraction or mass.  Bony landmarks are intact.  Eyes - Extraocular movements intact, and the pupils were reactive to light.  Sclera were not icteric or injected, conjunctiva were pink and moist.  Mouth - Examination of the oral cavity showed pink, healthy oral mucosa. No lesions or ulcerations noted.  The tongue was mobile and midline, and the dentition were in good condition.    Throat - The walls of the oropharynx were smooth, pink, moist, symmetric, and had no lesions or ulcerations.  The tonsillar pillars and soft palate were symmetric.  The uvula was midline on elevation.    Neck - Normal midline excursion of the laryngotracheal complex during swallowing.  Full range of motion on passive movement.  Palpation of the occipital, submental, submandibular, internal jugular chain, and supraclavicular nodes did not demonstrate any abnormal lymph nodes or masses.  Palpation of the thyroid was soft and smooth, with no nodules or goiter appreciated.  The trachea was mobile and midline.  Nose - External contour is symmetric, no gross deflection or scars.  Nasal mucosa is pink and moist with no abnormal mucus.  The septum was intact and the turbinates are enlarged.  No polyps, masses, or purulence noted on examination.       ASSESSMENT/ PLAN:    ICD-10-CM    1. Perennial allergic rhinitis  J30.89             Continue with allergy injections   Continue with every other week until January, Then advance to monthly injections.   Continue with Allegra  Use Nasal ayr gel  at night to both nostrils    Pati Bradley PA-C  ENT  [facility], [place]

## 2023-10-12 ENCOUNTER — OFFICE VISIT (OUTPATIENT)
Dept: OTOLARYNGOLOGY | Facility: OTHER | Age: 43
End: 2023-10-12
Attending: PHYSICIAN ASSISTANT
Payer: COMMERCIAL

## 2023-10-12 VITALS
BODY MASS INDEX: 38.09 KG/M2 | OXYGEN SATURATION: 98 % | TEMPERATURE: 97.3 F | WEIGHT: 237 LBS | HEART RATE: 64 BPM | SYSTOLIC BLOOD PRESSURE: 138 MMHG | RESPIRATION RATE: 16 BRPM | HEIGHT: 66 IN | DIASTOLIC BLOOD PRESSURE: 80 MMHG

## 2023-10-12 DIAGNOSIS — J30.89 PERENNIAL ALLERGIC RHINITIS: Primary | ICD-10-CM

## 2023-10-12 PROCEDURE — 99213 OFFICE O/P EST LOW 20 MIN: CPT | Performed by: PHYSICIAN ASSISTANT

## 2023-10-12 ASSESSMENT — PAIN SCALES - GENERAL: PAINLEVEL: NO PAIN (0)

## 2023-10-12 NOTE — LETTER
"    10/12/2023         RE: Gwendolyn Dietrich  [id number] [address] [facility] [address]        Dear Colleague,    Thank you for referring your patient, Gwendolyn Dietrich, to the [facility]. Please see a copy of my visit note below.    Chief Complaint   Patient presents with     Allergic Rhinitis     Allergy follow up (hx: Perennial allergic rhinitis)         Patient presents for f/u of SCIT.   She was last seen in ENT on 9/30/21 for SCIT follow up.    She has been doing well with allergy injections   Intermittent surface itching/ bruise following injections. No significant reaction.   No significant allergy symptoms.   She has been doing well.   No concerns with URI, Bronchitis, sinusitis.   No worrisome nasal congestion, post nasal drainage.   Denies n/f/v.   Adequate to fair water.   Denies facial pain or pressure. No recurrent sinusitis.      Allergies fairly well controlled.            MQT  Dilution #6-Birch, Cat, Dust  Dilution #5- Ragweed, thistle, grass, maple, molds, dog  Dilution #2- pigweed, cottonwood, walnut, molds.     SCIT- 10/1/19  SLIT- Maintenance dose - 6/15/21.   Started SCIT 2019  EOW 12/2022    Past Medical History:   Diagnosis Date     Obesity 5/8/2013        Allergies   Allergen Reactions     Seasonal Allergies Anaphylaxis     Birch, Dog and Cats - has Epi-pen for this reason     Codeine GI Disturbance     Current Outpatient Medications   Medication     EPINEPHrine (ANY BX GENERIC EQUIV) 0.3 MG/0.3ML injection 2-pack     fexofenadine (ALLEGRA) 180 MG tablet     ibuprofen (ADVIL/MOTRIN) 200 MG tablet     ORDER FOR ALLERGEN IMMUNOTHERAPY     Vitamin D3 (CHOLECALCIFEROL) 25 mcg (1000 units) tablet     No current facility-administered medications for this visit.     ROS - SEE HPI  /80 (BP Location: Right arm, Patient Position: Sitting, Cuff Size: Adult Large)   Pulse 64   Temp 97.3  F (36.3  C) (Tympanic)   Resp 16   Ht 1.676 m (5' 6\")   Wt 107.5 kg (237 lb)   LMP  " (LMP Unknown)   SpO2 98%   BMI 38.25 kg/m      General - The patient is well nourished and well developed, and appears to have good nutritional status.  Alert and oriented to person and place, answers questions and cooperates with examination appropriately.   Head and Face - Normocephalic and atraumatic, with no gross asymmetry noted.  The facial nerve is intact, with strong symmetric movements.  Voice and Breathing - The patient was breathing comfortably without the use of accessory muscles. There was no wheezing, stridor, or stertor.  The patients voice was clear and strong, and had appropriate pitch and quality.  Ears -The external auditory canals are patent, the tympanic membranes are intact without effusion, retraction or mass.  Bony landmarks are intact.  Eyes - Extraocular movements intact, and the pupils were reactive to light.  Sclera were not icteric or injected, conjunctiva were pink and moist.  Mouth - Examination of the oral cavity showed pink, healthy oral mucosa. No lesions or ulcerations noted.  The tongue was mobile and midline, and the dentition were in good condition.    Throat - The walls of the oropharynx were smooth, pink, moist, symmetric, and had no lesions or ulcerations.  The tonsillar pillars and soft palate were symmetric.  The uvula was midline on elevation.    Neck - Normal midline excursion of the laryngotracheal complex during swallowing.  Full range of motion on passive movement.  Palpation of the occipital, submental, submandibular, internal jugular chain, and supraclavicular nodes did not demonstrate any abnormal lymph nodes or masses.  Palpation of the thyroid was soft and smooth, with no nodules or goiter appreciated.  The trachea was mobile and midline.  Nose - External contour is symmetric, no gross deflection or scars.  Nasal mucosa is pink and moist with no abnormal mucus.  The septum was intact and the turbinates are enlarged.  No polyps, masses, or purulence noted on  examination.       ASSESSMENT/ PLAN:    ICD-10-CM    1. Perennial allergic rhinitis  J30.89             Continue with allergy injections   Continue with every other week until January, Then advance to monthly injections.   Continue with Allegra  Use Nasal ayr gel at night to both nostrils    Pati Bradley PA-C  ENT  [facility], [place]            Again, thank you for allowing me to participate in the care of your patient.        Sincerely,        Pati Bradley PA-C

## 2023-10-18 ENCOUNTER — ALLIED HEALTH/NURSE VISIT (OUTPATIENT)
Dept: ALLERGY | Facility: OTHER | Age: 43
End: 2023-10-18
Attending: FAMILY MEDICINE
Payer: COMMERCIAL

## 2023-10-18 DIAGNOSIS — J30.9 ALLERGIC RHINITIS: Primary | ICD-10-CM

## 2023-10-18 PROCEDURE — 95117 IMMUNOTHERAPY INJECTIONS: CPT

## 2023-10-18 NOTE — PROGRESS NOTES
Clinic Administered Medication Documentation      Injectable Medication Documentation    Is there an active order (written within the past 365 days, with administrations remaining, not ) in the chart? Yes.     Patient was given  0.5 mls from her Red vial in the back/upper left and right arms . Prior to medication administration, verified patient's identity using patient s name and date of birth. Please see MAR and medication order for additional information. Patient instructed to remain in clinic for 15 minutes and report any adverse reaction to staff immediately.    Vial/Syringe: Single dose vial. Was entire vial of medication used? Yes  Was this medication supplied by the patient? No  Is this a medication the patient will need to receive again? No - not necessary to check for refills remaining.   Allergy injection/s given and charted on paper allergy flow sheet.  Patient experienced unknown reaction. Patient signed out AMA and was instructed to use her Epi Pen and go to the ED if she should have a reaction.

## 2023-11-01 ENCOUNTER — APPOINTMENT (OUTPATIENT)
Dept: CT IMAGING | Facility: HOSPITAL | Age: 43
End: 2023-11-01
Attending: NURSE PRACTITIONER
Payer: COMMERCIAL

## 2023-11-01 ENCOUNTER — APPOINTMENT (OUTPATIENT)
Dept: GENERAL RADIOLOGY | Facility: HOSPITAL | Age: 43
End: 2023-11-01
Attending: NURSE PRACTITIONER
Payer: COMMERCIAL

## 2023-11-01 ENCOUNTER — ALLIED HEALTH/NURSE VISIT (OUTPATIENT)
Dept: ALLERGY | Facility: OTHER | Age: 43
End: 2023-11-01
Attending: NURSE PRACTITIONER
Payer: COMMERCIAL

## 2023-11-01 ENCOUNTER — HOSPITAL ENCOUNTER (EMERGENCY)
Facility: HOSPITAL | Age: 43
Discharge: HOME | End: 2023-11-01
Attending: NURSE PRACTITIONER | Admitting: NURSE PRACTITIONER
Payer: COMMERCIAL

## 2023-11-01 ENCOUNTER — TELEPHONE (OUTPATIENT)
Dept: FAMILY MEDICINE | Facility: OTHER | Age: 43
End: 2023-11-01
Payer: COMMERCIAL

## 2023-11-01 VITALS
RESPIRATION RATE: 16 BRPM | TEMPERATURE: 97.9 F | HEART RATE: 78 BPM | BODY MASS INDEX: 39.49 KG/M2 | WEIGHT: 236.99 LBS | HEIGHT: 65 IN | SYSTOLIC BLOOD PRESSURE: 142 MMHG | OXYGEN SATURATION: 98 % | DIASTOLIC BLOOD PRESSURE: 86 MMHG

## 2023-11-01 DIAGNOSIS — J30.9 ALLERGIC RHINITIS: Primary | ICD-10-CM

## 2023-11-01 DIAGNOSIS — S32.2XXA CLOSED FRACTURE OF COCCYX, INITIAL ENCOUNTER (H): Primary | ICD-10-CM

## 2023-11-01 PROCEDURE — 99213 OFFICE O/P EST LOW 20 MIN: CPT | Performed by: NURSE PRACTITIONER

## 2023-11-01 PROCEDURE — G0463 HOSPITAL OUTPT CLINIC VISIT: HCPCS

## 2023-11-01 PROCEDURE — 72192 CT PELVIS W/O DYE: CPT

## 2023-11-01 PROCEDURE — 95117 IMMUNOTHERAPY INJECTIONS: CPT

## 2023-11-01 PROCEDURE — 72220 X-RAY EXAM SACRUM TAILBONE: CPT

## 2023-11-01 ASSESSMENT — ACTIVITIES OF DAILY LIVING (ADL): ADLS_ACUITY_SCORE: 35

## 2023-11-01 ASSESSMENT — ENCOUNTER SYMPTOMS: BACK PAIN: 1

## 2023-11-01 NOTE — PROGRESS NOTES
Clinic Administered Medication Documentation  Patient was administered 0.5 mls from  her red vial in both the right upper/back arm and the left upper/back arm.  Allergy injection/s given and charted on paper allergy flow sheet.  Patient experienced unknown reaction as she signed the AMA form.  Risks of leaving before the 30 minute wait is explained and she understands.  She will use her Epi pen and report to the ED with any reaction.

## 2023-11-01 NOTE — DISCHARGE INSTRUCTIONS
Apply ice packs to the affected area for 20 minutes on/off.  Tylenol or ibuprofen as needed for pain.  You can purchase a donut so you can sit on it and that may help redistribute the pressure on your tailbone.    Follow-up with your doctor in 1 week for reevaluation.    Return to urgent care or emergency department for any worsening or concerning symptoms.

## 2023-11-01 NOTE — ED PROVIDER NOTES
History     Chief Complaint   Patient presents with    Tailbone Pain     Tailbone pain     HPI  Gwendolyn Dietrich is a 43 year old female who presents ambulatory to urgent care for evaluation of tailbone pain.  The patient tells me that she slipped on ice and fell off of the back of her truck and landed on her buttock cheeks.  She has had pain to her tailbone ever since.  Denies loss of bowel or bladder.  Denies hitting her head or LOC.  She is having difficulty sitting.  Standing feels better.  She has not taken anything for pain.  She is concerned about a fracture.    Allergies:  Allergies   Allergen Reactions    Seasonal Allergies Anaphylaxis     Birch, Dog and Cats - has Epi-pen for this reason    Codeine GI Disturbance       Problem List:    Patient Active Problem List    Diagnosis Date Noted    Endometriosis 04/27/2023     Priority: Medium    S/P BSO (bilateral salpingo-oophorectomy) 04/27/2023     Priority: Medium    Vitiligo 06/22/2022     Priority: Medium    Morbid obesity (H) 05/25/2021     Priority: Medium    Dyslipidemia 01/23/2018     Priority: Medium    Migraine headache 01/23/2018     Priority: Medium     Overview:   Recurrent migraine and tension headaches      ACP (advance care planning) 11/01/2016     Priority: Medium     Advance Care Planning 11/1/2016: ACP Review of Chart / Resources Provided:  Reviewed chart for advance care plan.  Gwendolyn Dietrich has no plan or code status on file. Discussed available resources and provided with information. Confirmed code status reflects current choices pending further ACP discussions.  Confirmed/documented legally designated decision makers.  Added by MARAH CALDERON            Pain in joint 12/10/2013     Priority: Medium    Obesity 05/08/2013     Priority: Medium    History of hysterectomy, supracervical 03/27/2012     Priority: Medium     Still needs to have PAP/HPV screening.       Hereditary and idiopathic peripheral neuropathy 09/09/2011      Priority: Medium        Past Medical History:    Past Medical History:   Diagnosis Date    Obesity 5/8/2013       Past Surgical History:    Past Surgical History:   Procedure Laterality Date    BACK SURGERY  01/01/2005    BIOPSY OF SKIN LESION      CHOLECYSTECTOMY  01/01/2011    COLONOSCOPY N/A 06/08/2022    serrated adenoma, follow uo 6/8/2023    COLONOSCOPY N/A 09/13/2023    tubular adenoma, f/u 5 years    HYSTERECTOMY, PAP STILL INDICATED  03/27/2012    CERVIX STILL REMAINS.    HYSTERECTOMY, SUPRACERVICAL LAPAROSCOPIC N/A 03/27/2012    Cervix and bilateral ovaries remain. Performed due to menorrhagia, precancerous cells    LAPAROSCOPY DIAGNOSTIC (GYN) Bilateral 04/19/2023    Procedure: LAPAROSCOPY; RIGHT ADNEXECTOMY AND BILATERAL SALPING OOPHORECTOMY, CAUTERIZATION OF ENDOMETRIOSIS;  Surgeon: Earnest Brooks MD;  Location: [facility] OR    ORTHOPEDIC SURGERY  01/01/2005    back surgery    TONSILLECTOMY      TUBAL LIGATION  01/01/2007       Family History:    Family History   Adopted: Yes   Problem Relation Age of Onset    Lipids Mother     Hypertension Mother     Irritable Bowel Syndrome Mother     Obesity Mother     Rheumatoid Arthritis Mother     Diabetes Mother     Hearing Loss Father     Lipids Father     Hypertension Father     Diabetes Father     Dementia Father     Diabetes Type 1 Sister     Cancer Maternal Grandmother     Osteoporosis Maternal Grandmother     Rheumatoid Arthritis Maternal Grandmother     Cancer Paternal Grandmother        Social History:  Marital Status:   [2]  Social History     Tobacco Use    Smoking status: Former     Types: Cigarettes     Quit date: 4/22/2008     Years since quitting: 15.5    Smokeless tobacco: Never   Vaping Use    Vaping Use: Never used   Substance Use Topics    Alcohol use: Yes     Comment: occasionally - a couple times a month    Drug use: Yes     Types: Marijuana        Medications:    EPINEPHrine (ANY BX GENERIC EQUIV) 0.3 MG/0.3ML injection 2-pack  fexofenadine  "(ALLEGRA) 180 MG tablet  ibuprofen (ADVIL/MOTRIN) 200 MG tablet  ORDER FOR ALLERGEN IMMUNOTHERAPY  Vitamin D3 (CHOLECALCIFEROL) 25 mcg (1000 units) tablet          Review of Systems   Musculoskeletal:  Positive for back pain. Negative for gait problem.   All other systems reviewed and are negative.      Physical Exam   BP: 142/86  Pulse: 78  Temp: 97.9  F (36.6  C)  Resp: 16  Height: 165.1 cm (5' 5\")  Weight: 107.5 kg (236 lb 15.9 oz)  SpO2: 98 %      Physical Exam  Vitals and nursing note reviewed.   Constitutional:       General: She is not in acute distress.     Appearance: Normal appearance. She is well-developed.   HENT:      Head: Normocephalic and atraumatic.   Eyes:      Conjunctiva/sclera: Conjunctivae normal.   Cardiovascular:      Rate and Rhythm: Normal rate.   Pulmonary:      Effort: Pulmonary effort is normal. No respiratory distress.   Abdominal:      General: Abdomen is flat.   Musculoskeletal:      Cervical back: Normal, normal range of motion and neck supple. No tenderness or bony tenderness. Normal range of motion.      Thoracic back: Normal. No tenderness or bony tenderness.      Comments: Tenderness to palpation over the sacral area and buttock cheeks.  No swelling, bruising or skin lesions appreciated to this area.    Old healed incision noted to her lower lumbar spine.   Skin:     General: Skin is warm and dry.      Coloration: Skin is not pale.   Neurological:      Mental Status: She is alert and oriented to person, place, and time.         ED Course                 Procedures         Results for orders placed or performed during the hospital encounter of 11/01/23 (from the past 24 hour(s))   XR Sacrum and Coccyx 2 Views    Narrative    EXAM: XR SACRUM AND COCCYX 2 VIEWS  11/1/2023 10:25 AM      HISTORY: slipped on ice yesterday and fell landing on her buttocks.  C/o tailbone pain and difficulty sitting down    COMPARISON: CT abdomen pelvis 2/22/2023    TECHNIQUE: AP and lateral views of the " sacrum and coccyx    FINDINGS:  No dislocation. No suspicious osseous lesion. The joint spaces are  preserved. Mild lower lumbar spondylosis. Subcortical sclerosis at the  sacrococcygeal junction.    No foreign body or subcutaneous emphysema.      Impression    IMPRESSION:  Subcortical sclerosis at the sacrococcygeal junction, could represent  degenerative change or subtle mild impaction injury.    CHATO OKEEFE MD         SYSTEM ID:  [id number]   CT Pelvis Bone wo Contrast    Narrative    CT PELVIS BONE WO CONTRAST    HISTORY: slipped and fell off ice yesterday; tailbone pain; xray shows  possible impaction injury .    COMPARISON: 11/1/2023.    TECHNIQUE: Noncontrast CT images of the bony pelvis.    FINDINGS:    There is a nondisplaced fracture of the proximal coccygeal segment. No  additional fracture is identified.     Degenerative changes are seen in the lumbar spine and SI joints. Hip  osteoarthritis is worse on the right.      No intrapelvic visceral abnormality is identified.      Impression    IMPRESSION:     Nondisplaced proximal coccygeal fracture.    SERAFIN ADDISON MD         SYSTEM ID:  [id number]       Medications - No data to display    Assessments & Plan (with Medical Decision Making)     I have reviewed the nursing notes.    I have reviewed the findings, diagnosis, plan and need for follow up with the patient.  This is a pleasant 43-year-old female that presented for evaluation of tailbone pain after she slipped on fell on ice yesterday.  No swelling, bruising or obvious deformity noted to the area of tenderness.  Initial x-ray was inconclusive.  The CT scan showed a nondisplaced proximal coccygeal fracture.  This was discussed with patient.  Patient declined anything for pain during this visit.  She also declined any prescription pain medications at this time.  Recommended applying ice packs to the affected area and taking Tylenol/ibuprofen as needed for pain.  Advised her to buy a donut cushion to  help when she is sitting down.  We discussed work restrictions with patient feeling like she can return to work with restrictions on lifting given.  Patient was advised to follow-up with her primary doctor in 1 week for reevaluation.  Advised her to return to urgent care or emergency department for any worsening or concerning symptoms.  This document was prepared using a combination of typing and voice generated software.  While every attempt was made for accuracy, spelling and grammatical errors may exist.         New Prescriptions    No medications on file       Final diagnoses:   Closed fracture of coccyx, initial encounter (H)       11/1/2023   [facility]       Mpofu, Patriciance, CNP  11/01/23 1509

## 2023-11-01 NOTE — ED TRIAGE NOTES
Pt presents with c/o tailbone pain. Reports she slipped on ice yesterday morning between 0600 am and 0630 am. Pt is able to ambulate. States sitting and standing after sitting increases pain. Denies loss of bowel or bladder, hitting head. Pt took ibuprofen last night. No otc meds today.

## 2023-11-01 NOTE — TELEPHONE ENCOUNTER
Patient called.   Needs a work in with any PCP  for ER  follow up next week, early next week.          Jazzmine Tee on 11/1/2023 at 1:51 PM

## 2023-11-01 NOTE — TELEPHONE ENCOUNTER
Reason for call: Patient wanting a work in appointment.    Is the appointment for a Hospital Follow up?  NO     Patient is having the following symptoms:  ER follow up - fractured coccyx    The patient is requesting an appointment with  [facility]    Was an appointment offered for this call? No    Preferred method for responding to this message: Telephone Call    Phone number patient can be reached at? Cell number on file:    Telephone Information:   Mobile [phone]   Mobile Not on file.       If we can't reach you directly, may we leave a detailed response at the number you provided? Yes    Can this message wait until your PCP/provider returns if unavailable today? Yes    Patient stated she needs an appointment on 11/08/2023.      Kenya López on 11/1/2023 at 12:12 PM

## 2023-11-01 NOTE — Clinical Note
Gwendolyn Dietrich was seen and treated in our emergency department on 11/1/2023.  She may return to work on 11/12/2023.  She will need to avoid lifting greater than 10lbs until cleared by physician.      If you have any questions or concerns, please don't hesitate to call.      Mpofu, Prudence, CNP

## 2023-11-01 NOTE — Clinical Note
Gwendolyn Dietrich was seen and treated in our emergency department on 11/1/2023.  She may return to work on 11/01/2023.  She will need to avoid lifting greater than 10lbs until cleared by physician.      If you have any questions or concerns, please don't hesitate to call.      Mpofu, Prudence, CNP

## 2023-11-02 NOTE — TELEPHONE ENCOUNTER
Patient scheduled with [facility] on 11/08/23 for a ER follow up so patient can return to work.  Jazzmine Miller LPN

## 2023-11-06 ENCOUNTER — OFFICE VISIT (OUTPATIENT)
Dept: FAMILY MEDICINE | Facility: OTHER | Age: 43
End: 2023-11-06
Attending: PHYSICIAN ASSISTANT
Payer: COMMERCIAL

## 2023-11-06 VITALS
DIASTOLIC BLOOD PRESSURE: 70 MMHG | SYSTOLIC BLOOD PRESSURE: 128 MMHG | BODY MASS INDEX: 40.02 KG/M2 | RESPIRATION RATE: 12 BRPM | TEMPERATURE: 97.9 F | HEIGHT: 65 IN | OXYGEN SATURATION: 98 % | WEIGHT: 240.2 LBS | HEART RATE: 79 BPM

## 2023-11-06 DIAGNOSIS — Z90.722 S/P TAH-BSO: ICD-10-CM

## 2023-11-06 DIAGNOSIS — S32.2XXD CLOSED FRACTURE OF COCCYX WITH ROUTINE HEALING, SUBSEQUENT ENCOUNTER: Primary | ICD-10-CM

## 2023-11-06 DIAGNOSIS — Z90.79 S/P TAH-BSO: ICD-10-CM

## 2023-11-06 DIAGNOSIS — Z13.820 SCREENING FOR OSTEOPOROSIS: ICD-10-CM

## 2023-11-06 DIAGNOSIS — Z90.710 S/P TAH-BSO: ICD-10-CM

## 2023-11-06 PROCEDURE — 99213 OFFICE O/P EST LOW 20 MIN: CPT | Performed by: PHYSICIAN ASSISTANT

## 2023-11-06 ASSESSMENT — PAIN SCALES - GENERAL: PAINLEVEL: MILD PAIN (3)

## 2023-11-06 NOTE — NURSING NOTE
Patient presents to clinic for ER follow up and return to work letter.  Peggy Bang LPN ....................  11/6/2023   10:39 AM

## 2023-11-06 NOTE — PROGRESS NOTES
Assessment & Plan     1. Closed fracture of coccyx with routine healing, subsequent encounter  Healing. Cleared to return to work without restrictions.     2. Screening for osteoporosis  3. S/P GEETA-BSO  Patient requesting earlier DEXA screening as she has had bilateral ovaries removed inducing surgical menopause. Recommend continuing with calcium-vitamin D supplements as well as low impact weight bearing exercises.   - DX Hip/Pelvis/Spine; Future      No follow-ups on file.    Ilana Brooks PA-C  [facility]    Angel Snow is a 43 year old, presenting for the following health issues:  ER F/U      History of Present Illness       She eats 2-3 servings of fruits and vegetables daily.She consumes 1 sweetened beverage(s) daily.She exercises with enough effort to increase her heart rate 20 to 29 minutes per day.  She exercises with enough effort to increase her heart rate 5 days per week.   She is taking medications regularly.     Here for urgent care follow-up.  Patient is a truck on 10/31 and landed on her back.  She was seen in urgent care on 11/1 where she was diagnosed with coccygeal fracture.  She has been off of work while she has been healing.  She feels she is ready to return to work.  Feel she would be able to return to work without restrictions.  Managing pain with ibuprofen.  Patient is also requesting a bone density scan.  She had previous hysterectomy and more recently had a bilateral oophorectomy.  Patient concern for surgical menopause and how that can relate to her bone health going forward.        PAST MEDICAL HISTORY:   Past Medical History:   Diagnosis Date    Obesity 5/8/2013       PAST SURGICAL HISTORY:   Past Surgical History:   Procedure Laterality Date    BACK SURGERY  01/01/2005    BIOPSY OF SKIN LESION      CHOLECYSTECTOMY  01/01/2011    COLONOSCOPY N/A 06/08/2022    serrated adenoma, follow uo 6/8/2023    COLONOSCOPY N/A 09/13/2023    tubular adenoma, f/u 5  years    HYSTERECTOMY, PAP STILL INDICATED  03/27/2012    CERVIX STILL REMAINS.    HYSTERECTOMY, SUPRACERVICAL LAPAROSCOPIC N/A 03/27/2012    Cervix and bilateral ovaries remain. Performed due to menorrhagia, precancerous cells    LAPAROSCOPY DIAGNOSTIC (GYN) Bilateral 04/19/2023    Procedure: LAPAROSCOPY; RIGHT ADNEXECTOMY AND BILATERAL SALPING OOPHORECTOMY, CAUTERIZATION OF ENDOMETRIOSIS;  Surgeon: Earnest Brooks MD;  Location: [facility] OR    ORTHOPEDIC SURGERY  01/01/2005    back surgery    TONSILLECTOMY      TUBAL LIGATION  01/01/2007       FAMILY HISTORY:   Family History   Adopted: Yes   Problem Relation Age of Onset    Lipids Mother     Hypertension Mother     Irritable Bowel Syndrome Mother     Obesity Mother     Rheumatoid Arthritis Mother     Diabetes Mother     Hearing Loss Father     Lipids Father     Hypertension Father     Diabetes Father     Dementia Father     Diabetes Type 1 Sister     Cancer Maternal Grandmother     Osteoporosis Maternal Grandmother     Rheumatoid Arthritis Maternal Grandmother     Cancer Paternal Grandmother        SOCIAL HISTORY:   Social History     Tobacco Use    Smoking status: Former     Types: Cigarettes     Quit date: 4/22/2008     Years since quitting: 15.5    Smokeless tobacco: Never   Substance Use Topics    Alcohol use: Yes     Comment: occasionally - a couple times a month        Allergies   Allergen Reactions    Seasonal Allergies Anaphylaxis     Birch, Dog and Cats - has Epi-pen for this reason    Codeine GI Disturbance     Current Outpatient Medications   Medication    EPINEPHrine (ANY BX GENERIC EQUIV) 0.3 MG/0.3ML injection 2-pack    fexofenadine (ALLEGRA) 180 MG tablet    ibuprofen (ADVIL/MOTRIN) 200 MG tablet    ORDER FOR ALLERGEN IMMUNOTHERAPY    Vitamin D3 (CHOLECALCIFEROL) 25 mcg (1000 units) tablet     No current facility-administered medications for this visit.         Review of Systems   Per HPI        Objective    /70   Pulse 79   Temp 97.9  F (36.6  " C)   Resp 12   Ht 1.651 m (5' 5\")   Wt 109 kg (240 lb 3.2 oz)   LMP  (LMP Unknown)   SpO2 98%   BMI 39.97 kg/m    Body mass index is 39.97 kg/m .  Physical Exam   General: Pleasant, in no apparent distress.  Psych: Appropriate mood and affect.        "

## 2023-11-06 NOTE — LETTER
November 6, 2023      Gwendolyn Dietrich  [address] [address] [facility] [address]        To Whom It May Concern:    Gwendolyn Dietrich was seen in our clinic. She may return to work without restrictions as of 11/06/2023.      Sincerely,        Ilaan Brooks PA-C

## 2023-11-10 ENCOUNTER — ALLIED HEALTH/NURSE VISIT (OUTPATIENT)
Dept: ALLERGY | Facility: OTHER | Age: 43
End: 2023-11-10
Attending: NURSE PRACTITIONER
Payer: COMMERCIAL

## 2023-11-10 DIAGNOSIS — J30.89 PERENNIAL ALLERGIC RHINITIS: Primary | ICD-10-CM

## 2023-11-10 PROCEDURE — 95165 ANTIGEN THERAPY SERVICES: CPT | Performed by: NURSE PRACTITIONER

## 2023-11-13 NOTE — PROGRESS NOTES
Allergy serum was mixed on 11/10/2023 by Marleny Nava NP and Pati Bradley PA-C at [facility],  into  2  (5 ml)  multi dose vial/vials.    Allergens included were:    Ragweed  0.2 ml of dilution # 1  Pigweed  0.2 ml of dilution # 1  Mugwort 0.2 ml of dilution # 0  Kochia  0.2 ml of dilution # 0  Russian Thistle 0.2 ml of dilution # 1  Alfonso Grass 0.2 ml of dilution # 1  Birch mix 0.2 ml of dilution # 1  Maple Mix 0.2 of dilution # 1  Elm Mix 0.2 ml of dilution # 0  Oak Mix 0.2 ml of dilution # 0  Toribio Mix 0.2 ml of dilution # 0  Pine Mix 0.2 ml of dilution # 0  Eastern Rich 0.2 ml of dilution # 1  Black Narberth 0.2 ml of dilution # 1  Aspen 0.2 ml of dilution # 0  Red Sterling 0.2 ml of dilution # 0    Alternaria 0.2 ml of dilution # 1  Aspergillus 0.2 ml of dilution # 1  Hormodendrum 0.2 ml of dilution # 1  Helminthosporium 0.2 ml of dilution # 1  Penicillium 0.2 ml of dilution # 1  Epicoccum 0.2 ml of dilution # 1  Fusarium 0.2 ml of dilution # 1  Mucor 0.2 ml of dilution # 0  Grain Smut 0.2 ml of dilution # 0  Grass Smut 0.2 ml of dilution # 0  Cat 0.2 ml of dilution # 1  Dog 0.2 ml of dilution # 1  Feather Mix 0.2 ml of dilution # 0  Dust Mite Mix 0.2 ml of dilution # 1  Horse 0.2 ml of dilution # 0

## 2023-11-15 ENCOUNTER — ALLIED HEALTH/NURSE VISIT (OUTPATIENT)
Dept: ALLERGY | Facility: OTHER | Age: 43
End: 2023-11-15
Attending: NURSE PRACTITIONER
Payer: COMMERCIAL

## 2023-11-15 DIAGNOSIS — J30.9 ALLERGIC RHINITIS: Primary | ICD-10-CM

## 2023-11-15 PROCEDURE — 95117 IMMUNOTHERAPY INJECTIONS: CPT

## 2023-11-15 NOTE — PROGRESS NOTES
Prior to injection verified patient identity using patient name and date of birth.    Questions asked: Yes    Patient was given allergy injection(s) of 0.5 mL from Red vial given in Both arm(s).    Injection(s) charted on paper allergy flow sheet.    Patient left against medical advice (AMA), signed form and did not stay for the observation period.    Due to injection administration, patient instructed to remain in clinic for 30 minutes  afterwards, and to report any adverse reaction to me immediately.    Patient was instructed to seek medical attention/go to the emergency room if having any reaction symptoms or needing to use their Epipen, patient aware and acknowledged. Patient signed out AMA form at 1000. Patient aware and acknowledged signs/symptoms of anaphylaxis and ambulated out of the clinic independently.

## 2023-11-29 ENCOUNTER — ALLIED HEALTH/NURSE VISIT (OUTPATIENT)
Dept: ALLERGY | Facility: OTHER | Age: 43
End: 2023-11-29
Attending: NURSE PRACTITIONER
Payer: COMMERCIAL

## 2023-11-29 DIAGNOSIS — J30.9 ALLERGIC RHINITIS: Primary | ICD-10-CM

## 2023-11-29 PROCEDURE — 95117 IMMUNOTHERAPY INJECTIONS: CPT

## 2023-11-29 NOTE — PROGRESS NOTES
Prior to injection patient identity verified using name and date of birth.     SVT done on left arm, measuring 7 MM. Passed Red vial 1.     SVT done on right arm, measuring 7 MM. Passed Red vial 2.     Documented on paper flow sheet.     Allergy injection given and charted on paper allergy flow sheet. Patient signed out AMA at 1145.    Prior to injection verified patient identity using patient name and date of birth.    Questions asked: Yes    Patient was given allergy injection(s) of 0.5 mL from Red vial given in Both arm(s).    Injection(s) charted on paper allergy flow sheet.    Patient left against medical advice (AMA), signed form and did not stay for the observation period. Patient ambulated out of the clinic independently.     Patient was instructed to seek medical attention/go to the emergency room if having any reaction symptoms or needing to use their Epipen, patient aware and acknowledged. Patient signed out AMA form at 1145.

## 2023-12-13 ENCOUNTER — ALLIED HEALTH/NURSE VISIT (OUTPATIENT)
Dept: ALLERGY | Facility: OTHER | Age: 43
End: 2023-12-13
Attending: PHYSICIAN ASSISTANT
Payer: COMMERCIAL

## 2023-12-13 DIAGNOSIS — J30.9 ALLERGIC RHINITIS: Primary | ICD-10-CM

## 2023-12-13 PROCEDURE — 95117 IMMUNOTHERAPY INJECTIONS: CPT

## 2023-12-13 NOTE — PROGRESS NOTES
Prior to injection verified patient identity using patient name and date of birth.    Questions asked: Yes    Patient was given allergy injection(s) of 0.5 mL from Red vial given in Both arm(s).    Injection(s) charted on paper allergy flow sheet.    Patient left against medical advice (AMA), signed form and did not stay for the observation period.    Patient was instructed to seek medical attention/go to the emergency room if having any reaction symptoms or needing to use their Epipen, patient aware and acknowledged. Patient signed out AMA form at 1126 and ambulated out of the clinic.

## 2023-12-14 ENCOUNTER — HOSPITAL ENCOUNTER (OUTPATIENT)
Dept: BONE DENSITY | Facility: HOSPITAL | Age: 43
Discharge: HOME | End: 2023-12-14
Attending: PHYSICIAN ASSISTANT | Admitting: PHYSICIAN ASSISTANT
Payer: COMMERCIAL

## 2023-12-14 ENCOUNTER — OFFICE VISIT (OUTPATIENT)
Dept: FAMILY MEDICINE | Facility: OTHER | Age: 43
End: 2023-12-14
Attending: FAMILY MEDICINE
Payer: COMMERCIAL

## 2023-12-14 VITALS
HEART RATE: 64 BPM | WEIGHT: 237.6 LBS | DIASTOLIC BLOOD PRESSURE: 82 MMHG | SYSTOLIC BLOOD PRESSURE: 128 MMHG | OXYGEN SATURATION: 97 % | TEMPERATURE: 98.6 F | RESPIRATION RATE: 18 BRPM | BODY MASS INDEX: 39.54 KG/M2

## 2023-12-14 DIAGNOSIS — Z90.79 S/P TAH-BSO: ICD-10-CM

## 2023-12-14 DIAGNOSIS — E66.01 MORBID OBESITY (H): ICD-10-CM

## 2023-12-14 DIAGNOSIS — E55.9 VITAMIN D DEFICIENCY: ICD-10-CM

## 2023-12-14 DIAGNOSIS — Z13.820 SCREENING FOR OSTEOPOROSIS: ICD-10-CM

## 2023-12-14 DIAGNOSIS — Z90.710 S/P TAH-BSO: ICD-10-CM

## 2023-12-14 DIAGNOSIS — M65.30 TRIGGER FINGER, ACQUIRED: Primary | ICD-10-CM

## 2023-12-14 DIAGNOSIS — Z90.722 S/P TAH-BSO: ICD-10-CM

## 2023-12-14 PROCEDURE — 77080 DXA BONE DENSITY AXIAL: CPT

## 2023-12-14 PROCEDURE — 99213 OFFICE O/P EST LOW 20 MIN: CPT | Performed by: FAMILY MEDICINE

## 2023-12-14 RX ORDER — VITAMIN B COMPLEX
25 TABLET ORAL DAILY
Qty: 90 TABLET | Refills: 4 | Status: SHIPPED | OUTPATIENT
Start: 2023-12-14

## 2023-12-14 RX ORDER — MELOXICAM 15 MG/1
15 TABLET ORAL DAILY
Qty: 90 TABLET | Refills: 4 | Status: SHIPPED | OUTPATIENT
Start: 2023-12-14

## 2023-12-14 ASSESSMENT — PAIN SCALES - GENERAL: PAINLEVEL: MILD PAIN (3)

## 2023-12-14 NOTE — NURSING NOTE
"Chief Complaint   Patient presents with    Pain     Joint pain- Bilateral fists and bilateral middle fingers       Initial /82   Pulse 64   Temp 98.6  F (37  C) (Tympanic)   Resp 18   Wt 107.8 kg (237 lb 9.6 oz)   LMP  (LMP Unknown)   SpO2 97%   BMI 39.54 kg/m   Estimated body mass index is 39.54 kg/m  as calculated from the following:    Height as of 11/6/23: 1.651 m (5' 5\").    Weight as of this encounter: 107.8 kg (237 lb 9.6 oz).  Medication Reconciliation: complete          "

## 2023-12-14 NOTE — PROGRESS NOTES
"  Assessment & Plan     (M65.30) Trigger finger, acquired  (primary encounter diagnosis)  Comment: per Up TO Date, initial treatment is rest and NSAIDS. If progressing, consult with sports med for possble injections and lastly ortho for surgery.   Plan: meloxicam (MOBIC) 15 MG tablet             (E55.9) Vitamin D deficiency  Comment:    Plan: Vitamin D3 (CHOLECALCIFEROL) 25 mcg (1000         units) tablet        Refilled     (E66.01) Morbid obesity (H)  Comment:  improving. Encouraged ongoing weight loss  Plan:               BMI:   Estimated body mass index is 39.54 kg/m  as calculated from the following:    Height as of 11/6/23: 1.651 m (5' 5\").    Weight as of this encounter: 107.8 kg (237 lb 9.6 oz).   Weight management plan: Discussed healthy diet and exercise guidelines        No follow-ups on file.    Alfonso Alvarez MD  [facility]   Gwendolyn is a 43 year old, presenting for the following health issues:  Pain (Joint pain- Bilateral fists and bilateral middle fingers)        12/14/2023     2:26 PM   Additional Questions   Roomed by ASHLYN Dover   Accompanied by Self         12/14/2023     2:26 PM   Patient Reported Additional Medications   Patient reports taking the following new medications N/A       Musculoskeletal Problem     Fell a few weeks ago, with a coccyx fracture. It is now mostly back to normal.     Now having loss of range of motion in her fingers. Worse in the morning. Waking with bilateral 3rd fingers triggering and sticking. At times has to manually extend them. He's for a few months. Is getting worse. It feels like it is catching at the PIP joints.     Has lost 25# now, simply because she is working. Walking over 7 miles 5 days a week.             Review of Systems         Objective    /82   Pulse 64   Temp 98.6  F (37  C) (Tympanic)   Resp 18   Wt 107.8 kg (237 lb 9.6 oz)   LMP  (LMP Unknown)   SpO2 97%   BMI 39.54 kg/m    Body mass index is 39.54 " kg/m .  Physical Exam  Constitutional:       Appearance: Normal appearance.   Musculoskeletal:      Comments: Bilateral 3rd fingers with mild pain along the PIP joints, but no active triggering currently.    Neurological:      General: No focal deficit present.      Mental Status: She is alert and oriented to person, place, and time.

## 2024-01-03 NOTE — TELEPHONE ENCOUNTER
LIZANDROElvira López on 11/2/2023 at 12:41 PM     azaTHIOprine (Imuran) 50 mg tablet [[id number]]    Order Details  Dose: 25 mg Route: oral Frequency: Every other day   Dispense Quantity: 30 tablet Refills: 1          Sig: Take 0.5 tablets (25 mg) by mouth every other day.         Start Date: 05/25/23 End Date: --   Written Date: 05/25/23 Rx Expiration Date: 05/24/24        Associated Diagnoses: Stage 3a chronic kidney disease (CMS/HCC) [N18.31]   Original Order: azaTHIOprine (Imuran) 50 mg tablet [[id number]]   [facility]. THANK YOU.

## 2024-01-10 ENCOUNTER — ALLIED HEALTH/NURSE VISIT (OUTPATIENT)
Dept: ALLERGY | Facility: OTHER | Age: 44
End: 2024-01-10
Attending: PHYSICIAN ASSISTANT
Payer: COMMERCIAL

## 2024-01-10 DIAGNOSIS — J30.9 ALLERGIC RHINITIS: Primary | ICD-10-CM

## 2024-01-10 PROCEDURE — 95117 IMMUNOTHERAPY INJECTIONS: CPT

## 2024-01-10 NOTE — PROGRESS NOTES
Prior to injection verified patient identity using patient name and date of birth.    Questions asked: Yes    Patient was given allergy injection(s) of 0.5 mL from Red vial given in Both arm(s).    Injection(s) charted on paper allergy flow sheet.    Patient left against medical advice (AMA), signed form and did not stay for the observation period.    Patient was instructed to seek medical attention/go to the emergency room if having any reaction symptoms or needing to use their Epipen, patient aware and acknowledged. Patient signed out AMA form at 0950 and ambulated out of the clinic.

## 2024-02-07 ENCOUNTER — ALLIED HEALTH/NURSE VISIT (OUTPATIENT)
Dept: ALLERGY | Facility: OTHER | Age: 44
End: 2024-02-07
Attending: NURSE PRACTITIONER
Payer: COMMERCIAL

## 2024-02-07 DIAGNOSIS — J30.9 ALLERGIC RHINITIS: Primary | ICD-10-CM

## 2024-02-07 PROCEDURE — 95117 IMMUNOTHERAPY INJECTIONS: CPT

## 2024-02-07 NOTE — PROGRESS NOTES
Prior to injection verified patient identity using patient name and date of birth.    Questions asked: Yes    Patient was given allergy injection(s) of 0.5 mL from Red vial given in Both arm(s).    Injection(s) charted on paper allergy flow sheet.    Epipen present at appointment.    Patient left against medical advice (AMA), signed form and did not stay for the observation period.  Patient was instructed to seek medical attention/go to the emergency room if having any reaction symptoms or needing to use their Epipen, patient aware and acknowledged. Patient signed out AMA form at 0915 and ambulated out of the clinic.

## 2024-02-23 NOTE — PROGRESS NOTES
Prior to injection, verified patient's identity using patient's name and date of birth.    Allergy injection/s given and charted on paper allergy flow sheet.  Patient signed out AMA and did not stay for observation.    Maude Huber RN     Yes

## 2024-03-04 ENCOUNTER — PATIENT OUTREACH (OUTPATIENT)
Dept: GASTROENTEROLOGY | Facility: CLINIC | Age: 44
End: 2024-03-04
Payer: COMMERCIAL

## 2024-03-06 ENCOUNTER — ALLIED HEALTH/NURSE VISIT (OUTPATIENT)
Dept: ALLERGY | Facility: OTHER | Age: 44
End: 2024-03-06
Attending: NURSE PRACTITIONER
Payer: COMMERCIAL

## 2024-03-06 DIAGNOSIS — J30.89 PERENNIAL ALLERGIC RHINITIS: Primary | ICD-10-CM

## 2024-03-06 DIAGNOSIS — J30.9 ALLERGIC RHINITIS: ICD-10-CM

## 2024-03-06 PROCEDURE — 95117 IMMUNOTHERAPY INJECTIONS: CPT

## 2024-03-06 NOTE — PROGRESS NOTES
Prior to injection verified patient identity using patient name and date of birth.    Questions asked: Yes    Patient was given allergy injection(s) of 0.5 mL from Red vial given in Both arm(s).    Injection(s) charted on paper allergy flow sheet.    Epipen present at appointment.    Patient left against medical advice (AMA), signed form and did not stay for the observation period.  Patient was instructed to seek medical attention/go to the emergency room if having any reaction symptoms or needing to use their Epipen, patient aware and acknowledged. Patient signed out AMA form at 0920 and ambulated out of the clinic.

## 2024-04-01 ENCOUNTER — ALLIED HEALTH/NURSE VISIT (OUTPATIENT)
Dept: ALLERGY | Facility: OTHER | Age: 44
End: 2024-04-01
Attending: PHYSICIAN ASSISTANT
Payer: COMMERCIAL

## 2024-04-01 DIAGNOSIS — J30.9 ALLERGIC RHINITIS: ICD-10-CM

## 2024-04-01 DIAGNOSIS — J30.89 PERENNIAL ALLERGIC RHINITIS: Primary | ICD-10-CM

## 2024-04-01 PROCEDURE — 95117 IMMUNOTHERAPY INJECTIONS: CPT

## 2024-04-01 NOTE — PROGRESS NOTES
Prior to injection verified patient identity using patient name and date of birth.    Questions asked: Yes    Patient was given allergy injection(s) of 0.5 mL from Red vial given in Both arm(s).    Injection(s) charted on paper allergy flow sheet.    Epipen present at appointment.    Patient left against medical advice (AMA), signed form and did not stay for the observation period.  Patient was instructed to seek medical attention/go to the emergency room if having any reaction symptoms or needing to use their Epipen, patient aware and acknowledged. Patient signed out AMA form at 0820 and ambulated out of the clinic.

## 2024-04-26 ENCOUNTER — TELEPHONE (OUTPATIENT)
Dept: ALLERGY | Facility: OTHER | Age: 44
End: 2024-04-26

## 2024-04-26 DIAGNOSIS — J30.89 PERENNIAL ALLERGIC RHINITIS: Primary | ICD-10-CM

## 2024-04-28 ENCOUNTER — HEALTH MAINTENANCE LETTER (OUTPATIENT)
Age: 44
End: 2024-04-28

## 2024-04-29 ENCOUNTER — ALLIED HEALTH/NURSE VISIT (OUTPATIENT)
Dept: ALLERGY | Facility: OTHER | Age: 44
End: 2024-04-29
Attending: NURSE PRACTITIONER
Payer: COMMERCIAL

## 2024-04-29 DIAGNOSIS — J30.89 PERENNIAL ALLERGIC RHINITIS: Primary | ICD-10-CM

## 2024-04-29 DIAGNOSIS — J30.9 ALLERGIC RHINITIS: ICD-10-CM

## 2024-04-29 PROCEDURE — 95117 IMMUNOTHERAPY INJECTIONS: CPT

## 2024-04-29 NOTE — PROGRESS NOTES
Prior to injection verified patient identity using patient name and date of birth.    Questions asked: Yes    Patient was given allergy injection(s) of 0.5 mL from Red vial given in Both arm(s).    Injection(s) charted on paper allergy flow sheet.    Epipen present at appointment.    Patient left against medical advice (AMA), signed form and did not stay for the observation period.  Patient was instructed to seek medical attention/go to the emergency room if having any reaction symptoms or needing to use their Epipen, patient aware and acknowledged. Patient signed out AMA form at 0830 and ambulated out of the clinic.

## 2024-05-20 ENCOUNTER — ALLIED HEALTH/NURSE VISIT (OUTPATIENT)
Dept: ALLERGY | Facility: OTHER | Age: 44
End: 2024-05-20
Attending: PHYSICIAN ASSISTANT
Payer: COMMERCIAL

## 2024-05-20 DIAGNOSIS — J30.89 PERENNIAL ALLERGIC RHINITIS: Primary | ICD-10-CM

## 2024-05-20 PROCEDURE — 95165 ANTIGEN THERAPY SERVICES: CPT | Performed by: PHYSICIAN ASSISTANT

## 2024-05-21 NOTE — PROGRESS NOTES
Allergy serum is mixed 5/20/24 at schedule of red maintenance,  into  2  (5 ml)  multi dose vial/vials.    Allergens included were:    Ragweed  0.2 ml of dilution # 1  Pigweed  0.2 ml of dilution # 1  Russian Thistle 0.2 ml of dilution # 1  Alfonso Grass 0.2 ml of dilution # 1  Birch mix 0.2 ml of dilution # 1  Maple Mix 0.2 of dilution # 1  Elm Mix 0.2 ml of dilution # 0  Oak Mix 0.2 ml of dilution # 0  Toribio Mix 0.2 ml of dilution # 0  Pine Mix 0.2 ml of dilution # 0  Eastern [place] 0.2 ml of dilution # 1  Black [place] 0.2 ml of dilution # 1  Alternaria 0.2 ml of dilution # 1  Aspergillus 0.2 ml of dilution # 1  Hormodendrum 0.2 ml of dilution # 1  Helminthosporium 0.2 ml of dilution # 1  Penicillium 0.2 ml of dilution # 1  Epicoccum 0.2 ml of dilution # 1  Fusarium 0.2 ml of dilution # 1  Cat 0.2 ml of dilution # 1  Dog 0.2 ml of dilution # 1  Dust Mite Mix 0.2 ml of dilution # 1

## 2024-05-22 ENCOUNTER — OFFICE VISIT (OUTPATIENT)
Dept: FAMILY MEDICINE | Facility: OTHER | Age: 44
End: 2024-05-22
Attending: FAMILY MEDICINE
Payer: COMMERCIAL

## 2024-05-22 ENCOUNTER — HOSPITAL ENCOUNTER (OUTPATIENT)
Dept: CT IMAGING | Facility: OTHER | Age: 44
Discharge: HOME | End: 2024-05-22
Attending: FAMILY MEDICINE
Payer: COMMERCIAL

## 2024-05-22 VITALS
HEIGHT: 65 IN | WEIGHT: 249 LBS | RESPIRATION RATE: 16 BRPM | TEMPERATURE: 97.2 F | BODY MASS INDEX: 41.48 KG/M2 | SYSTOLIC BLOOD PRESSURE: 132 MMHG | OXYGEN SATURATION: 98 % | DIASTOLIC BLOOD PRESSURE: 82 MMHG | HEART RATE: 79 BPM

## 2024-05-22 DIAGNOSIS — Z90.722 S/P TAH-BSO: ICD-10-CM

## 2024-05-22 DIAGNOSIS — R10.9 RIGHT FLANK PAIN: Primary | ICD-10-CM

## 2024-05-22 DIAGNOSIS — R10.9 RIGHT FLANK PAIN: ICD-10-CM

## 2024-05-22 DIAGNOSIS — Z90.79 S/P TAH-BSO: ICD-10-CM

## 2024-05-22 DIAGNOSIS — R39.89 URINARY PROBLEM: ICD-10-CM

## 2024-05-22 DIAGNOSIS — Z90.710 S/P TAH-BSO: ICD-10-CM

## 2024-05-22 LAB
ALBUMIN UR-MCNC: NEGATIVE MG/DL
APPEARANCE UR: CLEAR
BILIRUB UR QL STRIP: NEGATIVE
COLOR UR AUTO: NORMAL
GLUCOSE UR STRIP-MCNC: NEGATIVE MG/DL
HGB UR QL STRIP: NEGATIVE
KETONES UR STRIP-MCNC: NEGATIVE MG/DL
LEUKOCYTE ESTERASE UR QL STRIP: NEGATIVE
NITRATE UR QL: NEGATIVE
PH UR STRIP: 5.5 [PH] (ref 5–9)
SP GR UR STRIP: 1.01 (ref 1–1.03)
UROBILINOGEN UR STRIP-MCNC: NORMAL MG/DL

## 2024-05-22 PROCEDURE — 81003 URINALYSIS AUTO W/O SCOPE: CPT | Mod: ZL | Performed by: FAMILY MEDICINE

## 2024-05-22 PROCEDURE — 250N000011 HC RX IP 250 OP 636: Performed by: FAMILY MEDICINE

## 2024-05-22 PROCEDURE — 99213 OFFICE O/P EST LOW 20 MIN: CPT | Performed by: FAMILY MEDICINE

## 2024-05-22 PROCEDURE — 74177 CT ABD & PELVIS W/CONTRAST: CPT

## 2024-05-22 RX ORDER — IOPAMIDOL 755 MG/ML
144 INJECTION, SOLUTION INTRAVASCULAR ONCE
Status: COMPLETED | OUTPATIENT
Start: 2024-05-22 | End: 2024-05-22

## 2024-05-22 RX ADMIN — IOPAMIDOL 144 ML: 755 INJECTION, SOLUTION INTRAVENOUS at 14:52

## 2024-05-22 ASSESSMENT — PAIN SCALES - GENERAL: PAINLEVEL: SEVERE PAIN (6)

## 2024-05-22 NOTE — NURSING NOTE
"Chief Complaint   Patient presents with    Musculoskeletal Problem     Right sided flank pain - inside pain       Initial /82 (BP Location: Right arm, Patient Position: Sitting, Cuff Size: Adult Regular)   Pulse 79   Temp 97.2  F (36.2  C) (Temporal)   Resp 16   Ht 1.651 m (5' 5\")   Wt 112.9 kg (249 lb)   LMP  (LMP Unknown)   SpO2 98%   BMI 41.44 kg/m   Estimated body mass index is 41.44 kg/m  as calculated from the following:    Height as of this encounter: 1.651 m (5' 5\").    Weight as of this encounter: 112.9 kg (249 lb).    Medication Review: complete    The next two questions are to help us understand your food security.  If you are feeling you need any assistance in this area, we have resources available to support you today.          10/2/2023   SDOH- Food Insecurity   Within the past 12 months, did you worry that your food would run out before you got money to buy more? N   Within the past 12 months, did the food you bought just not last and you didn t have money to get more? N       Health Care Directive:  Patient does not have a Health Care Directive or Living Will: Discussed advance care planning with patient; however, patient declined at this time.    Katia Osuna LPN      "

## 2024-05-22 NOTE — PROGRESS NOTES
"    Assessment & Plan       ICD-10-CM    1. Right flank pain  R10.9 CT Abdomen Pelvis w Contrast     CANCELED: CT Abdomen Pelvis w/o & w Contrast      2. Urinary problem  R39.89 UA Macroscopic with reflex to Microscopic and Culture      3. S/P GEETA-BSO  Z90.710     Z90.722     Z90.79            UA is normal.  Patient contacted with results of CT scan which do not show a stone, hydronephrosis, abscess, blockage, mass or cause of her symptoms.  Symptoms could be musculoskeletal, could be scar tissue.  Encourage her to use over the counter analgesics, ice, heat and follow-up if symptoms not improving.    PDMP Review         Value Time User    State PDMP site checked  Yes 11/1/2023 11:54 AM Mpofu, Prudence, CNP            Ordering of each unique test       Return if symptoms worsen or fail to improve.    TRAY BLANCO MD  [facility]   Gwendolyn Dietrich is a 43 year old female  presenting for the following health issues: Nursing Notes:   Katia Osuna LPN  5/22/2024  1:48 PM  Signed  Chief Complaint   Patient presents with    Musculoskeletal Problem     Right sided flank pain - inside pain       Initial /82 (BP Location: Right arm, Patient Position: Sitting, Cuff Size: Adult Regular)   Pulse 79   Temp 97.2  F (36.2  C) (Temporal)   Resp 16   Ht 1.651 m (5' 5\")   Wt 112.9 kg (249 lb)   LMP  (LMP Unknown)   SpO2 98%   BMI 41.44 kg/m   Estimated body mass index is 41.44 kg/m  as calculated from the following:    Height as of this encounter: 1.651 m (5' 5\").    Weight as of this encounter: 112.9 kg (249 lb).    Medication Review: complete    The next two questions are to help us understand your food security.  If you are feeling you need any assistance in this area, we have resources available to support you today.          10/2/2023   SDOH- Food Insecurity   Within the past 12 months, did you worry that your food would run out before you got money to buy more? " N   Within the past 12 months, did the food you bought just not last and you didn t have money to get more? N       Health Care Directive:  Patient does not have a Health Care Directive or Living Will: Discussed advance care planning with patient; however, patient declined at this time.    Katia Osuna LPN                                  HPI Gwendolyn Dietrich is a 43 year old female presents for evaluation of right  flank pain.  Onset of symptoms  a few weeks ago.  Feels the same as when she had an ovarian cyst , but the ovaries have been removed.  This was last year.    Comes and goes thru the day.  No burning or stinging with urination.  No hematuria or fevers.  No history of kidney stones.    No nausea and vomiting.  No change in bowel habits.  No fever.  No rash.    Nothing really makes her symptoms better or worse.         Current Outpatient Medications   Medication Sig Dispense Refill    botulinum toxin type A (BOTOX) 100 units injection Inject 155 Units into the muscle once every twelve weeks (Patient not taking: Reported on 5/22/2024)      EPINEPHrine (ANY BX GENERIC EQUIV) 0.3 MG/0.3ML injection 2-pack Inject 0.3 mLs (0.3 mg) into the muscle as needed for anaphylaxis 2 each 2    fexofenadine (ALLEGRA) 180 MG tablet Take 1 tablet (180 mg) by mouth daily 90 tablet 3    ibuprofen (ADVIL/MOTRIN) 200 MG tablet Take 200 mg by mouth every 4 hours as needed for mild pain (Patient not taking: Reported on 12/14/2023)      meloxicam (MOBIC) 15 MG tablet Take 1 tablet (15 mg) by mouth daily (Patient not taking: Reported on 5/22/2024) 90 tablet 4    ORDER FOR ALLERGEN IMMUNOTHERAPY Continue every other week allergy injections x 2 years (12/7/2022). Follow standard maintenance protocols. (Patient not taking: Reported on 5/22/2024) 10 mL     Vitamin D3 (CHOLECALCIFEROL) 25 mcg (1000 units) tablet Take 1 tablet (25 mcg) by mouth daily (Patient not taking: Reported on 5/22/2024) 90 tablet 4     No current  "facility-administered medications for this visit.     Past Medical History:   Diagnosis Date    Obesity 5/8/2013               Review of Systems           5/7/2015    10:00 AM   PHQ   PHQ-9 Total Score 1   Q9: Thoughts of better off dead/self-harm past 2 weeks Not at all          No data to display                      Objective  /82 (BP Location: Right arm, Patient Position: Sitting, Cuff Size: Adult Regular)   Pulse 79   Temp 97.2  F (36.2  C) (Temporal)   Resp 16   Ht 1.651 m (5' 5\")   Wt 112.9 kg (249 lb)   LMP  (LMP Unknown)   SpO2 98%   BMI 41.44 kg/m     Physical Exam   GENERAL: alert and no distress  ABDOMEN: soft, nontender, without hepatosplenomegaly or masses  BACK:  mild right CVA/lower back tenderness, normal ROM, no rash     Results for orders placed or performed during the hospital encounter of 05/22/24   CT Abdomen Pelvis w Contrast     Status: None    Narrative    PROCEDURE:  CT ABDOMEN PELVIS W CONTRAST    HISTORY: Right flank pain    TECHNIQUE: Helical CT of the abdomen and pelvis was performed  following injection of intravenous contrast. This CT exam was  performed using one or more the following dose reduction techniques:  automated exposure control, adjustment of the mA and/or kV according  to patient size, and/or iterative reconstruction technique.    COMPARISON: 2/22/2023    MEDS/CONTRAST: 144 ml isovue 370    FINDINGS:      Limited images through the lung bases demonstrate no focal  consolidation or mass.     The liver demonstrates no mass or intrahepatic biliary ductal  dilatation. The gallbladder is surgically absent. The spleen, pancreas  and adrenal glands are unremarkable. Symmetric nephrograms are present  without hydronephrosis. No ureteral calculus is seen. There is no  abdominal aortic aneurysm.    The bowel is normal in caliber. No abdominal wall hernia is seen.     No free fluid, free air or adenopathy is present. No suspicious  osseous lesions are identified.      " Impression    IMPRESSION:      No finding to account for acute right abdominal pain.    SERAFIN ADDISON MD         SYSTEM ID:  [id number]   Results for orders placed or performed in visit on 05/22/24   UA Macroscopic with reflex to Microscopic and Culture     Status: Normal    Specimen: Urine, Midstream   Result Value Ref Range    Color Urine Light Yellow Colorless, Straw, Light Yellow, Yellow    Appearance Urine Clear Clear    Glucose Urine Negative Negative mg/dL    Bilirubin Urine Negative Negative    Ketones Urine Negative Negative mg/dL    Specific Gravity Urine 1.012 1.000 - 1.030    Blood Urine Negative Negative    pH Urine 5.5 5.0 - 9.0    Protein Albumin Urine Negative Negative mg/dL    Urobilinogen Urine Normal Normal, 2.0 mg/dL    Nitrite Urine Negative Negative    Leukocyte Esterase Urine Negative Negative    Narrative    Microscopic not indicated             Answers submitted by the patient for this visit:  General Questionnaire (Submitted on 5/20/2024)  Chief Complaint: Chronic problems general questions HPI Form  How many servings of fruits and vegetables do you eat daily?: 2-3  On average, how many sweetened beverages do you drink each day (Examples: soda, juice, sweet tea, etc.  Do NOT count diet or artificially sweetened beverages)?: 1  How many minutes a day do you exercise enough to make your heart beat faster?: 10 to 19  How many days a week do you exercise enough to make your heart beat faster?: 4  How many days per week do you miss taking your medication?: 0  General Concern (Submitted on 5/20/2024)  Chief Complaint: Chronic problems general questions HPI Form  What is the reason for your visit today?: Flank pain  When did your symptoms begin?: 1-2 weeks ago  What are your symptoms?: Pain in flank area  How would you describe these symptoms?: Moderate  Are your symptoms:: Worsening  Have you had these symptoms before?: Yes  Have you tried or received treatment for these symptoms before?:  Yes  Did that treatment work? : No

## 2024-11-24 ENCOUNTER — HEALTH MAINTENANCE LETTER (OUTPATIENT)
Age: 44
End: 2024-11-24

## 2025-02-10 ENCOUNTER — OFFICE VISIT (OUTPATIENT)
Dept: PEDIATRICS | Facility: OTHER | Age: 45
End: 2025-02-10
Attending: INTERNAL MEDICINE
Payer: COMMERCIAL

## 2025-02-10 VITALS
HEART RATE: 85 BPM | BODY MASS INDEX: 43.43 KG/M2 | RESPIRATION RATE: 16 BRPM | DIASTOLIC BLOOD PRESSURE: 88 MMHG | WEIGHT: 261 LBS | TEMPERATURE: 98.7 F | OXYGEN SATURATION: 96 % | SYSTOLIC BLOOD PRESSURE: 130 MMHG

## 2025-02-10 DIAGNOSIS — E66.01 MORBID OBESITY (H): ICD-10-CM

## 2025-02-10 DIAGNOSIS — M75.102 ROTATOR CUFF SYNDROME, LEFT: Primary | ICD-10-CM

## 2025-02-10 ASSESSMENT — PAIN SCALES - GENERAL: PAINLEVEL_OUTOF10: MODERATE PAIN (6)

## 2025-02-10 NOTE — PROGRESS NOTES
Assessment & Plan       ICD-10-CM    1. Rotator cuff syndrome, left  M75.102 Physical Therapy  Referral     Occupational Therapy  Referral      2. Morbid obesity (H)  E66.01           5/10 left shoulder pain present for 1 month without isolated causative event. Present at baseline and with activities of daily living. On exam she has full ROM of her left shoulder with some associated pain. There is diffuse tenderness to palpation across the shoulder with increased point tenderness to medial scapular border and anterior shoulder. Pain is reproduced with resisted external rotation, abduction, and left test to isolate infraspinatus. This is most likely rotator cuff syndrome; we discussed referral to PT and OT to strengthen the supporting structures and using heat/cold/topical NSAIDs for reduction of inflammation. Differential includes rotator cuff tear, shoulder impingement, and frozen shoulder although these are less likely given her presentation. Plan to return if symptoms persist or worsen after PT/OT. Would then discuss further management with referral to sports medicine or orthopedics and likely MRI imaging.   - Physical Therapy  Referral; Future  - Occupational Therapy  Referral; Future      Patient Instructions    -- Ice/heat   -- Try voltaren gel   -- PT/OT consults      Return if symptoms worsen or fail to improve.    Marce Du MS3  [facility] Medical Student    Attestation Statement:  I was present with the medical student who participated in the service and in the documentation of this note. I have verified the history and personally performed the physical exam and medical decision making, and have verified the content of the note which accurately reflects my assessment of the patient and the plan of care.    Signed, Nilton Curtis MD, FAAP, FACP  Internal Medicine & Pediatrics  2/11/2025 8:39 AM      Subjective   Gwendolyn Dietrich is a 44 year old female who presents for  Shoulder Pain (Constant pain left shoulder x 1 month). Pain has been present for about a month in left shoulder. No trauma or precipitating event that started the pain. Now has daily generalized pain with variety of daily activities and movements but also at rest. Pain is rated 5/10 and considered more annoying and bothersome than acute. Has tried ibuprofen with no symptom improvement. Has not tried icing or heating the shoulder. She is right handed but thinks that she was taught this to make life easier but actually is left handed. No tingling or numbness to her left arm. No weakness in movement, she has been trying to stretch and move it. 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 She does not have any neck pain.     Objective   Vitals: /88   Pulse 85   Temp 98.7  F (37.1  C) (Tympanic)   Resp 16   Wt 118.4 kg (261 lb)   LMP  (LMP Unknown)   SpO2 96%   Breastfeeding No   BMI 43.43 kg/m      General: well appearing  CV: Regular rate and rhythm, no murmur, rub or gallop  Pulm: Clear to auscultation bilaterally, no wheezing, rales or rhonchi  Neuro: Grossly intact  Musculoskeletal: Normal  strength bilaterally. Right shoulder ROM without pain. Left shoulder: Full ROM with intermittent associated pain. Pain reproduced with resisted arm abduction and with the lift test to isolate infraspinatus. Pain with resisted external rotation of the shoulder. Diffuse tenderness to palpation of shoulder with increased tenderness at medial scapular border and anterior shoulder.   Skin: No rash  Psychiatry: Normal affect and insight.

## 2025-02-10 NOTE — NURSING NOTE
"Chief Complaint   Patient presents with    Shoulder Pain     Constant pain left shoulder x 1 month       Initial /88   Pulse 85   Temp 98.7  F (37.1  C) (Tympanic)   Resp 16   Wt 118.4 kg (261 lb)   LMP  (LMP Unknown)   SpO2 96%   Breastfeeding No   BMI 43.43 kg/m   Estimated body mass index is 43.43 kg/m  as calculated from the following:    Height as of 5/22/24: 1.651 m (5' 5\").    Weight as of this encounter: 118.4 kg (261 lb).  Medication Review: complete    The next two questions are to help us understand your food security.  If you are feeling you need any assistance in this area, we have resources available to support you today.          10/2/2023   SDOH- Food Insecurity   Within the past 12 months, did you worry that your food would run out before you got money to buy more? N   Within the past 12 months, did the food you bought just not last and you didn t have money to get more? N         Health Care Directive:  Patient does not have a Health Care Directive: Discussed advance care planning with patient; however, patient declined at this time.    Norma J. Gosselin, LPN      "

## 2025-03-12 ENCOUNTER — HOSPITAL ENCOUNTER (OUTPATIENT)
Dept: MRI IMAGING | Facility: OTHER | Age: 45
Discharge: HOME | End: 2025-03-12
Attending: INTERNAL MEDICINE
Payer: COMMERCIAL

## 2025-03-12 DIAGNOSIS — M54.12 CERVICAL RADICULOPATHY: ICD-10-CM

## 2025-03-12 PROCEDURE — A9575 INJ GADOTERATE MEGLUMI 0.1ML: HCPCS | Performed by: INTERNAL MEDICINE

## 2025-03-12 PROCEDURE — 72156 MRI NECK SPINE W/O & W/DYE: CPT

## 2025-03-12 PROCEDURE — 255N000002 HC RX 255 OP 636: Performed by: INTERNAL MEDICINE

## 2025-03-12 RX ORDER — GADOTERATE MEGLUMINE 376.9 MG/ML
20 INJECTION INTRAVENOUS ONCE
Status: COMPLETED | OUTPATIENT
Start: 2025-03-12 | End: 2025-03-12

## 2025-03-12 RX ADMIN — GADOTERATE MEGLUMINE 20 ML: 376.9 INJECTION INTRAVENOUS at 19:45

## 2025-03-17 ENCOUNTER — THERAPY VISIT (OUTPATIENT)
Dept: PHYSICAL THERAPY | Facility: OTHER | Age: 45
End: 2025-03-17
Attending: INTERNAL MEDICINE
Payer: COMMERCIAL

## 2025-03-17 DIAGNOSIS — M75.102 ROTATOR CUFF SYNDROME, LEFT: ICD-10-CM

## 2025-03-17 PROCEDURE — 97110 THERAPEUTIC EXERCISES: CPT | Mod: GP | Performed by: PHYSICAL THERAPIST

## 2025-03-17 PROCEDURE — 97161 PT EVAL LOW COMPLEX 20 MIN: CPT | Mod: GP | Performed by: PHYSICAL THERAPIST

## 2025-03-17 NOTE — PROGRESS NOTES
PHYSICAL THERAPY EVALUATION  Type of Visit: Evaluation              Subjective         Presenting condition or subjective complaint: left shoulder pain and left arm numbness/tingling    Date of onset: 01/10/25    Relevant medical history: Menopause; Arthritis; Overweight   Dates & types of surgery: L5-S1 microdisectomy in 2005  Precautions:      Prior diagnostic imaging/testing results: MRI Showed age related changes that do not match clinical presentation   Prior therapy history for the same diagnosis, illness or injury: Yes PT in the past for left shoulder but not sure what the issue was back then per patient    Prior level of function: independent   Employment:   Does a lot of work on a computer  Hobbies/Interests:      Patient goals for therapy: sleep and do her computer work as needed      Pain assessment:  2/10 on average  Makes it worse: roll over onto the left arm at night  Makes it better: nothing        Objective   OBSERVATION: tends to shrug when uncomfortable but tries to pay attention to it a lot.     SENSATION: intact to light touch    PALPATION: Tender at infraspinatus and upper trap bilaterally but worse on the left and can cause some referral down the arm to infraspinatus    Cervical ROM - L head movements cause discomfort on L   AROM PROM    Flexion WNL WNL    Extension WNL WNL    R Rotation WNL WNL    L Rotation WNL WNL    R SB WNL WNL    L SB WNL WNL      Shoulder AROM and PROM is full and painfree bilaterally except right is restricted with functional IR due to pre-existing injury 30 years ago    Myotomes Comments   Shoulder ABD (C5)    Elbow Flex/Wrist Ext (C6)    Elbow Ext/Wrist Flex (C7)    Finger Ext (C8)    Finger ABD (T1)      UE Strength   Right Left      Flexion 5/5, strong 5/5, strong    ABD 5/5, strong 5/5, strong    ER0 5/5, strong 4/5, mod resist      SPECIAL TESTS:    Left Right   Compression Negative  Negative    Distraction Negative  Negative    Spurling s Negative  Negative       Assessment & Plan   CLINICAL IMPRESSIONS  Medical Diagnosis: L rotator cuff syndrome    Treatment Diagnosis: pain, ROM deficits, weakness   Impression/Assessment: Patient is a 44 year old female presenting with referring diagnosis of left shoulder and arm pain/numbness and tingling .  The following significant findings have been identified: pain, strength deficits, and decreased activity tolerance. These impairments interfere with their ability to perform household work, self-cares, recreational activity, pushing, pulling, reaching, and lifting as compared to previous level of function.     Clinical Decision Making (Complexity):  Clinical Presentation: Evolving/Changing  Clinical Presentation Rationale: based on medical and personal factors listed in PT evaluation  Clinical Decision Making (Complexity): Low complexity    PLAN OF CARE  Treatment Interventions:  Modalities: Cryotherapy, Cupping, Dry Needling, E-stim, Hot Pack, Vasoneumatic Device  Interventions: Gait Training, Manual Therapy, Neuromuscular Re-education, Therapeutic Activity, Therapeutic Exercise, Aquatic Therapy    Long Term Goals     PT Goal 1  Goal Identifier: Short Term Goal 1  Goal Description: Patient will not have pain in the left shoulder when sleeping at night  Rationale: to maximize safety and independence with performance of ADLs and functional tasks  Target Date: 04/16/25  PT Goal 2  Goal Identifier: Short Term Goal 2  Goal Description: Patient will be able to perform self cares without tingling or pain in the shoulder  Rationale: to maximize safety and independence with self cares  Target Date: 04/16/25  PT Goal 3  Goal Identifier: Long Term Goal 1  Goal Description: Patient lawrence be able to work at her computer as needed without pain  Rationale: to maximize safety and independence with performance of ADLs and functional tasks  Target Date: 06/09/25  PT Goal 4  Goal Identifier: Long Term Goal 2  Goal Description: Patient will report no  numbness or tingling into the left arm for 1 week  Rationale: to maximize safety and independence with performance of ADLs and functional tasks  Target Date: 06/09/25      Frequency of Treatment: 1-2x/week  Duration of Treatment: 12 weeks    Education Assessment:   Learner/Method: Patient;Listening;Reading;Demonstration;Pictures/Video    Risks and benefits of evaluation/treatment have been explained.   Patient/Family/caregiver agrees with Plan of Care.     Evaluation Time:     PT Eval, Low Complexity Minutes (23949): 25     Signing Clinician: Tin Hebert PT

## 2025-03-19 ENCOUNTER — THERAPY VISIT (OUTPATIENT)
Dept: PHYSICAL THERAPY | Facility: OTHER | Age: 45
End: 2025-03-19
Attending: INTERNAL MEDICINE
Payer: COMMERCIAL

## 2025-03-19 DIAGNOSIS — M75.102 ROTATOR CUFF SYNDROME, LEFT: Primary | ICD-10-CM

## 2025-03-19 PROCEDURE — 97140 MANUAL THERAPY 1/> REGIONS: CPT | Mod: GP | Performed by: PHYSICAL THERAPIST

## 2025-03-19 PROCEDURE — 97110 THERAPEUTIC EXERCISES: CPT | Mod: GP | Performed by: PHYSICAL THERAPIST

## 2025-03-19 PROCEDURE — 97112 NEUROMUSCULAR REEDUCATION: CPT | Mod: GP | Performed by: PHYSICAL THERAPIST

## 2025-03-26 ENCOUNTER — THERAPY VISIT (OUTPATIENT)
Dept: PHYSICAL THERAPY | Facility: OTHER | Age: 45
End: 2025-03-26
Attending: INTERNAL MEDICINE
Payer: COMMERCIAL

## 2025-03-26 DIAGNOSIS — M75.102 ROTATOR CUFF SYNDROME, LEFT: Primary | ICD-10-CM

## 2025-03-26 PROCEDURE — 97110 THERAPEUTIC EXERCISES: CPT | Mod: GP | Performed by: PHYSICAL THERAPIST

## 2025-03-26 PROCEDURE — 97140 MANUAL THERAPY 1/> REGIONS: CPT | Mod: GP | Performed by: PHYSICAL THERAPIST

## 2025-08-09 ENCOUNTER — PATIENT OUTREACH (OUTPATIENT)
Dept: CARE COORDINATION | Facility: CLINIC | Age: 45
End: 2025-08-09
Payer: COMMERCIAL

## 2025-08-09 ENCOUNTER — HOSPITAL ENCOUNTER (EMERGENCY)
Facility: OTHER | Age: 45
Discharge: HOME | End: 2025-08-09
Attending: EMERGENCY MEDICINE
Payer: COMMERCIAL

## 2025-08-09 VITALS
SYSTOLIC BLOOD PRESSURE: 171 MMHG | HEART RATE: 70 BPM | WEIGHT: 251 LBS | OXYGEN SATURATION: 99 % | DIASTOLIC BLOOD PRESSURE: 110 MMHG | BODY MASS INDEX: 40.34 KG/M2 | HEIGHT: 66 IN | TEMPERATURE: 97.8 F | RESPIRATION RATE: 16 BRPM

## 2025-08-09 DIAGNOSIS — M25.512 ACUTE PAIN OF LEFT SHOULDER: Primary | ICD-10-CM

## 2025-08-09 PROCEDURE — 99283 EMERGENCY DEPT VISIT LOW MDM: CPT | Performed by: EMERGENCY MEDICINE

## 2025-08-09 PROCEDURE — 250N000013 HC RX MED GY IP 250 OP 250 PS 637: Performed by: EMERGENCY MEDICINE

## 2025-08-09 RX ORDER — HYDROCODONE BITARTRATE AND ACETAMINOPHEN 5; 325 MG/1; MG/1
1 TABLET ORAL ONCE
Refills: 0 | Status: COMPLETED | OUTPATIENT
Start: 2025-08-09 | End: 2025-08-09

## 2025-08-09 RX ORDER — HYDROCODONE BITARTRATE AND ACETAMINOPHEN 5; 325 MG/1; MG/1
1 TABLET ORAL EVERY 6 HOURS PRN
Qty: 10 TABLET | Refills: 0 | Status: SHIPPED | OUTPATIENT
Start: 2025-08-09 | End: 2025-08-12

## 2025-08-09 RX ADMIN — HYDROCODONE BITARTRATE AND ACETAMINOPHEN 1 TABLET: 5; 325 TABLET ORAL at 08:00

## 2025-08-09 ASSESSMENT — ENCOUNTER SYMPTOMS
VOMITING: 0
ARTHRALGIAS: 1
SHORTNESS OF BREATH: 0
LIGHT-HEADEDNESS: 0
CHILLS: 0
NAUSEA: 0
DYSURIA: 0
CHEST TIGHTNESS: 0
AGITATION: 0
FEVER: 0

## 2025-08-09 ASSESSMENT — COLUMBIA-SUICIDE SEVERITY RATING SCALE - C-SSRS
2. HAVE YOU ACTUALLY HAD ANY THOUGHTS OF KILLING YOURSELF IN THE PAST MONTH?: NO
6. HAVE YOU EVER DONE ANYTHING, STARTED TO DO ANYTHING, OR PREPARED TO DO ANYTHING TO END YOUR LIFE?: NO
1. IN THE PAST MONTH, HAVE YOU WISHED YOU WERE DEAD OR WISHED YOU COULD GO TO SLEEP AND NOT WAKE UP?: NO

## 2025-08-13 ENCOUNTER — HOSPITAL ENCOUNTER (OUTPATIENT)
Dept: MRI IMAGING | Facility: OTHER | Age: 45
Discharge: HOME | End: 2025-08-13
Attending: FAMILY MEDICINE
Payer: COMMERCIAL

## 2025-08-13 ENCOUNTER — HOSPITAL ENCOUNTER (OUTPATIENT)
Dept: GENERAL RADIOLOGY | Facility: OTHER | Age: 45
Discharge: HOME | End: 2025-08-13
Attending: FAMILY MEDICINE
Payer: COMMERCIAL

## 2025-08-13 DIAGNOSIS — S43.432A TEAR OF LEFT GLENOID LABRUM, INITIAL ENCOUNTER: ICD-10-CM

## 2025-08-13 PROCEDURE — 250N000009 HC RX 250: Performed by: RADIOLOGY

## 2025-08-13 PROCEDURE — A9575 INJ GADOTERATE MEGLUMI 0.1ML: HCPCS | Performed by: RADIOLOGY

## 2025-08-13 PROCEDURE — 73222 MRI JOINT UPR EXTREM W/DYE: CPT | Mod: LT

## 2025-08-13 PROCEDURE — 73222 MRI JOINT UPR EXTREM W/DYE: CPT | Mod: 26 | Performed by: RADIOLOGY

## 2025-08-13 PROCEDURE — 255N000002 HC RX 255 OP 636: Performed by: RADIOLOGY

## 2025-08-13 PROCEDURE — 77002 NEEDLE LOCALIZATION BY XRAY: CPT

## 2025-08-13 RX ORDER — LIDOCAINE HYDROCHLORIDE 10 MG/ML
5 INJECTION, SOLUTION INFILTRATION; PERINEURAL ONCE
Status: COMPLETED | OUTPATIENT
Start: 2025-08-13 | End: 2025-08-13

## 2025-08-13 RX ORDER — GADOTERATE MEGLUMINE 376.9 MG/ML
0.1 INJECTION INTRAVENOUS ONCE
Status: COMPLETED | OUTPATIENT
Start: 2025-08-13 | End: 2025-08-13

## 2025-08-13 RX ADMIN — IOHEXOL 5 ML: 240 INJECTION, SOLUTION INTRATHECAL; INTRAVASCULAR; INTRAVENOUS; ORAL at 12:51

## 2025-08-13 RX ADMIN — LIDOCAINE HYDROCHLORIDE 5 ML: 10 INJECTION, SOLUTION INFILTRATION; PERINEURAL at 12:51

## 2025-08-13 RX ADMIN — GADOTERATE MEGLUMINE 0.1 ML: 376.9 INJECTION INTRAVENOUS at 12:51

## (undated) DEVICE — ENDO FORCEP ENDOJAW BIOPSY 2.8MMX230CM FB-220U

## (undated) DEVICE — ENDO BRUSH CHANNEL MASTER CLEANING 2-4.2MM BW-412T

## (undated) DEVICE — PACK LAPAROSCOPY LF SBA15LPFCA

## (undated) DEVICE — ESU CORD MONOPOLAR 10'  E0510

## (undated) DEVICE — TUBING SUCTION 10'X3/16" N510

## (undated) DEVICE — ENDO TROCAR FIRST ENTRY KII FIOS ADV FIX 05X100MM CFF03

## (undated) DEVICE — CATH SELF FEMALE 14FR 6" 214

## (undated) DEVICE — ESU GROUND PAD ADULT W/CORD E7507

## (undated) DEVICE — ENDO TRAP POLYP E-TRAP 00711099

## (undated) DEVICE — ENDO KIT COMPLIANCE DYKENDOCMPLY

## (undated) DEVICE — GLOVE PROTEXIS BLUE W/NEU-THERA 8.0  2D73EB80

## (undated) DEVICE — ESU LIGASURE LAPAROSCPC L-HOOK SEALER/DVDR 5MM-44CM LF5644

## (undated) DEVICE — TROCAR KII SLEEVE 5MM X 100MM 12/BX

## (undated) DEVICE — NDL-INSUFFLATION 120MM

## (undated) DEVICE — TUBING INSUFFLATOR W/FILTER OLYMPUS WA95005A

## (undated) DEVICE — ENDO POUCH UNIV RETRIEVAL SYSTEM INZII 10MM CD001

## (undated) DEVICE — ESU HOLDER LAP INST DISP PURPLE LONG 330MM H-PRO-330

## (undated) DEVICE — SOL WATER 1500ML

## (undated) DEVICE — Device

## (undated) DEVICE — PREP POVIDONE IODINE SWABSTICKS TRIPLE PACK MDS093902A

## (undated) DEVICE — PREP CHLORAPREP 26ML TINTED ORANGE  260815

## (undated) DEVICE — ADH SKIN CLOSURE PREMIERPRO EXOFIN 1.0ML 3470

## (undated) DEVICE — GLOVE PROTEXIS POWDER FREE SMT 7.5  2D72PT75X

## (undated) DEVICE — SUCTION MANIFOLD NEPTUNE 2 SYS 4 PORT 0702-020-000

## (undated) DEVICE — SPECIMEN TRAP VACUUM SUCTION 003984-901

## (undated) DEVICE — SU MONOCRYL 3-0 PS-2 27" Y427H

## (undated) DEVICE — SLEEVE COMPRESSION SCD KNEE MED 74022

## (undated) DEVICE — PREP SKIN SCRUB TRAY 4461A

## (undated) DEVICE — SUCTION STRYKERFLOW II 250-070-500

## (undated) DEVICE — SU VICRYL 0 UR-6 27" J603H

## (undated) DEVICE — SYR 10ML FINGER CONTROL W/O NDL 309695

## (undated) RX ORDER — LIDOCAINE HYDROCHLORIDE 10 MG/ML
INJECTION, SOLUTION INFILTRATION; PERINEURAL
Status: DISPENSED
Start: 2025-08-13

## (undated) RX ORDER — PROPOFOL 10 MG/ML
INJECTION, EMULSION INTRAVENOUS
Status: DISPENSED
Start: 2022-06-08

## (undated) RX ORDER — FENTANYL CITRATE 50 UG/ML
INJECTION, SOLUTION INTRAMUSCULAR; INTRAVENOUS
Status: DISPENSED
Start: 2023-04-19

## (undated) RX ORDER — CEFAZOLIN SODIUM/WATER 2 G/20 ML
SYRINGE (ML) INTRAVENOUS
Status: DISPENSED
Start: 2023-04-19

## (undated) RX ORDER — ACETAMINOPHEN 325 MG/1
TABLET ORAL
Status: DISPENSED
Start: 2023-04-19

## (undated) RX ORDER — DEXAMETHASONE SODIUM PHOSPHATE 4 MG/ML
INJECTION, SOLUTION INTRA-ARTICULAR; INTRALESIONAL; INTRAMUSCULAR; INTRAVENOUS; SOFT TISSUE
Status: DISPENSED
Start: 2023-04-19

## (undated) RX ORDER — KETOROLAC TROMETHAMINE 30 MG/ML
INJECTION, SOLUTION INTRAMUSCULAR; INTRAVENOUS
Status: DISPENSED
Start: 2023-04-19

## (undated) RX ORDER — LIDOCAINE HYDROCHLORIDE 10 MG/ML
INJECTION, SOLUTION INFILTRATION; PERINEURAL
Status: DISPENSED
Start: 2022-01-25

## (undated) RX ORDER — ONDANSETRON 2 MG/ML
INJECTION INTRAMUSCULAR; INTRAVENOUS
Status: DISPENSED
Start: 2023-04-19

## (undated) RX ORDER — GLYCOPYRROLATE 0.2 MG/ML
INJECTION, SOLUTION INTRAMUSCULAR; INTRAVENOUS
Status: DISPENSED
Start: 2023-04-19

## (undated) RX ORDER — PROPOFOL 10 MG/ML
INJECTION, EMULSION INTRAVENOUS
Status: DISPENSED
Start: 2023-09-13

## (undated) RX ORDER — LIDOCAINE HYDROCHLORIDE AND EPINEPHRINE 10; 10 MG/ML; UG/ML
INJECTION, SOLUTION INFILTRATION; PERINEURAL
Status: DISPENSED
Start: 2022-01-25

## (undated) RX ORDER — SODIUM CHLORIDE, SODIUM LACTATE, POTASSIUM CHLORIDE, CALCIUM CHLORIDE 600; 310; 30; 20 MG/100ML; MG/100ML; MG/100ML; MG/100ML
INJECTION, SOLUTION INTRAVENOUS
Status: DISPENSED
Start: 2023-04-19

## (undated) RX ORDER — SCOLOPAMINE TRANSDERMAL SYSTEM 1 MG/1
PATCH, EXTENDED RELEASE TRANSDERMAL
Status: DISPENSED
Start: 2023-04-19

## (undated) RX ORDER — HYDROCODONE BITARTRATE AND ACETAMINOPHEN 5; 325 MG/1; MG/1
TABLET ORAL
Status: DISPENSED
Start: 2025-08-09

## (undated) RX ORDER — BUPIVACAINE HYDROCHLORIDE AND EPINEPHRINE 5; 5 MG/ML; UG/ML
INJECTION, SOLUTION EPIDURAL; INTRACAUDAL; PERINEURAL
Status: DISPENSED
Start: 2023-04-19

## (undated) RX ORDER — DEXMEDETOMIDINE HYDROCHLORIDE 4 UG/ML
INJECTION, SOLUTION INTRAVENOUS
Status: DISPENSED
Start: 2023-04-19

## (undated) RX ORDER — PROPOFOL 10 MG/ML
INJECTION, EMULSION INTRAVENOUS
Status: DISPENSED
Start: 2023-04-19